# Patient Record
Sex: FEMALE | Race: WHITE | Employment: FULL TIME | URBAN - METROPOLITAN AREA
[De-identification: names, ages, dates, MRNs, and addresses within clinical notes are randomized per-mention and may not be internally consistent; named-entity substitution may affect disease eponyms.]

---

## 2017-06-29 ENCOUNTER — ANESTHESIA EVENT (OUTPATIENT)
Dept: SURGERY | Age: 47
End: 2017-06-29
Payer: COMMERCIAL

## 2017-06-29 RX ORDER — MIDAZOLAM HYDROCHLORIDE 1 MG/ML
2 INJECTION, SOLUTION INTRAMUSCULAR; INTRAVENOUS ONCE
Status: CANCELLED | OUTPATIENT
Start: 2017-06-29 | End: 2017-06-29

## 2017-06-29 RX ORDER — OXYCODONE HYDROCHLORIDE 5 MG/1
5 TABLET ORAL
Status: CANCELLED | OUTPATIENT
Start: 2017-06-29

## 2017-06-29 RX ORDER — HYDROMORPHONE HYDROCHLORIDE 2 MG/ML
0.5 INJECTION, SOLUTION INTRAMUSCULAR; INTRAVENOUS; SUBCUTANEOUS
Status: CANCELLED | OUTPATIENT
Start: 2017-06-29

## 2017-06-29 RX ORDER — FENTANYL CITRATE 50 UG/ML
100 INJECTION, SOLUTION INTRAMUSCULAR; INTRAVENOUS ONCE
Status: CANCELLED | OUTPATIENT
Start: 2017-06-29 | End: 2017-06-29

## 2017-06-29 RX ORDER — SODIUM CHLORIDE, SODIUM LACTATE, POTASSIUM CHLORIDE, CALCIUM CHLORIDE 600; 310; 30; 20 MG/100ML; MG/100ML; MG/100ML; MG/100ML
100 INJECTION, SOLUTION INTRAVENOUS CONTINUOUS
Status: CANCELLED | OUTPATIENT
Start: 2017-06-29

## 2017-06-30 ENCOUNTER — HOSPITAL ENCOUNTER (OUTPATIENT)
Age: 47
Setting detail: OUTPATIENT SURGERY
Discharge: HOME | End: 2017-06-30
Attending: ORTHOPAEDIC SURGERY | Admitting: ORTHOPAEDIC SURGERY
Payer: COMMERCIAL

## 2017-06-30 ENCOUNTER — ANESTHESIA (OUTPATIENT)
Dept: SURGERY | Age: 47
End: 2017-06-30
Payer: COMMERCIAL

## 2017-06-30 VITALS
HEART RATE: 85 BPM | TEMPERATURE: 97.1 F | DIASTOLIC BLOOD PRESSURE: 71 MMHG | RESPIRATION RATE: 16 BRPM | SYSTOLIC BLOOD PRESSURE: 144 MMHG | OXYGEN SATURATION: 93 % | BODY MASS INDEX: 32.1 KG/M2 | WEIGHT: 167.13 LBS

## 2017-06-30 PROCEDURE — 76210000063 HC OR PH I REC FIRST 0.5 HR: Performed by: ORTHOPAEDIC SURGERY

## 2017-06-30 PROCEDURE — 76010000154 HC OR TIME FIRST 0.5 HR: Performed by: ORTHOPAEDIC SURGERY

## 2017-06-30 PROCEDURE — 74011250636 HC RX REV CODE- 250/636

## 2017-06-30 PROCEDURE — 74011250636 HC RX REV CODE- 250/636: Performed by: ORTHOPAEDIC SURGERY

## 2017-06-30 PROCEDURE — 77030012712 HC WND ARTHRO ABLT S&N -E: Performed by: ORTHOPAEDIC SURGERY

## 2017-06-30 PROCEDURE — 77030018836 HC SOL IRR NACL ICUM -A: Performed by: ORTHOPAEDIC SURGERY

## 2017-06-30 PROCEDURE — 74011250636 HC RX REV CODE- 250/636: Performed by: ANESTHESIOLOGY

## 2017-06-30 PROCEDURE — 74011000250 HC RX REV CODE- 250

## 2017-06-30 PROCEDURE — 77030006668 HC BLD SHV MENSCS STRY -B: Performed by: ORTHOPAEDIC SURGERY

## 2017-06-30 PROCEDURE — 76060000032 HC ANESTHESIA 0.5 TO 1 HR: Performed by: ORTHOPAEDIC SURGERY

## 2017-06-30 PROCEDURE — 76210000020 HC REC RM PH II FIRST 0.5 HR: Performed by: ORTHOPAEDIC SURGERY

## 2017-06-30 PROCEDURE — 77030020143 HC AIRWY LARYN INTUB CGAS -A: Performed by: ANESTHESIOLOGY

## 2017-06-30 PROCEDURE — 77030000032 HC CUF TRNQT ZIMM -B: Performed by: ORTHOPAEDIC SURGERY

## 2017-06-30 RX ORDER — DIPHENHYDRAMINE HCL 25 MG
25 TABLET ORAL
COMMUNITY
End: 2017-10-24

## 2017-06-30 RX ORDER — LIDOCAINE HYDROCHLORIDE 10 MG/ML
0.1 INJECTION INFILTRATION; PERINEURAL AS NEEDED
Status: DISCONTINUED | OUTPATIENT
Start: 2017-06-30 | End: 2017-06-30 | Stop reason: HOSPADM

## 2017-06-30 RX ORDER — PROPOFOL 10 MG/ML
INJECTION, EMULSION INTRAVENOUS AS NEEDED
Status: DISCONTINUED | OUTPATIENT
Start: 2017-06-30 | End: 2017-06-30 | Stop reason: HOSPADM

## 2017-06-30 RX ORDER — FENTANYL CITRATE 50 UG/ML
INJECTION, SOLUTION INTRAMUSCULAR; INTRAVENOUS AS NEEDED
Status: DISCONTINUED | OUTPATIENT
Start: 2017-06-30 | End: 2017-06-30 | Stop reason: HOSPADM

## 2017-06-30 RX ORDER — ONDANSETRON 2 MG/ML
INJECTION INTRAMUSCULAR; INTRAVENOUS AS NEEDED
Status: DISCONTINUED | OUTPATIENT
Start: 2017-06-30 | End: 2017-06-30 | Stop reason: HOSPADM

## 2017-06-30 RX ORDER — SODIUM CHLORIDE, SODIUM LACTATE, POTASSIUM CHLORIDE, CALCIUM CHLORIDE 600; 310; 30; 20 MG/100ML; MG/100ML; MG/100ML; MG/100ML
100 INJECTION, SOLUTION INTRAVENOUS CONTINUOUS
Status: DISCONTINUED | OUTPATIENT
Start: 2017-06-30 | End: 2017-06-30 | Stop reason: HOSPADM

## 2017-06-30 RX ORDER — LORAZEPAM 1 MG/1
1 TABLET ORAL AS NEEDED
COMMUNITY
End: 2017-10-24

## 2017-06-30 RX ORDER — MIDAZOLAM HYDROCHLORIDE 1 MG/ML
2 INJECTION, SOLUTION INTRAMUSCULAR; INTRAVENOUS
Status: COMPLETED | OUTPATIENT
Start: 2017-06-30 | End: 2017-06-30

## 2017-06-30 RX ORDER — ROPIVACAINE HYDROCHLORIDE 5 MG/ML
INJECTION, SOLUTION EPIDURAL; INFILTRATION; PERINEURAL AS NEEDED
Status: DISCONTINUED | OUTPATIENT
Start: 2017-06-30 | End: 2017-06-30 | Stop reason: HOSPADM

## 2017-06-30 RX ORDER — DEXAMETHASONE SODIUM PHOSPHATE 4 MG/ML
INJECTION, SOLUTION INTRA-ARTICULAR; INTRALESIONAL; INTRAMUSCULAR; INTRAVENOUS; SOFT TISSUE AS NEEDED
Status: DISCONTINUED | OUTPATIENT
Start: 2017-06-30 | End: 2017-06-30 | Stop reason: HOSPADM

## 2017-06-30 RX ORDER — LIDOCAINE HYDROCHLORIDE 20 MG/ML
INJECTION, SOLUTION EPIDURAL; INFILTRATION; INTRACAUDAL; PERINEURAL AS NEEDED
Status: DISCONTINUED | OUTPATIENT
Start: 2017-06-30 | End: 2017-06-30 | Stop reason: HOSPADM

## 2017-06-30 RX ORDER — CEFAZOLIN SODIUM IN 0.9 % NACL 2 G/50 ML
2 INTRAVENOUS SOLUTION, PIGGYBACK (ML) INTRAVENOUS ONCE
Status: COMPLETED | OUTPATIENT
Start: 2017-06-30 | End: 2017-06-30

## 2017-06-30 RX ADMIN — FENTANYL CITRATE 50 MCG: 50 INJECTION, SOLUTION INTRAMUSCULAR; INTRAVENOUS at 08:47

## 2017-06-30 RX ADMIN — CEFAZOLIN 2 G: 1 INJECTION, POWDER, FOR SOLUTION INTRAMUSCULAR; INTRAVENOUS; PARENTERAL at 08:33

## 2017-06-30 RX ADMIN — CEFAZOLIN 2 G: 1 INJECTION, POWDER, FOR SOLUTION INTRAMUSCULAR; INTRAVENOUS; PARENTERAL at 08:46

## 2017-06-30 RX ADMIN — SODIUM CHLORIDE, SODIUM LACTATE, POTASSIUM CHLORIDE, AND CALCIUM CHLORIDE 100 ML/HR: 600; 310; 30; 20 INJECTION, SOLUTION INTRAVENOUS at 07:30

## 2017-06-30 RX ADMIN — LIDOCAINE HYDROCHLORIDE 100 MG: 20 INJECTION, SOLUTION EPIDURAL; INFILTRATION; INTRACAUDAL; PERINEURAL at 08:39

## 2017-06-30 RX ADMIN — MIDAZOLAM HYDROCHLORIDE 2 MG: 1 INJECTION, SOLUTION INTRAMUSCULAR; INTRAVENOUS at 08:33

## 2017-06-30 RX ADMIN — ONDANSETRON 4 MG: 2 INJECTION INTRAMUSCULAR; INTRAVENOUS at 08:46

## 2017-06-30 RX ADMIN — PROPOFOL 200 MG: 10 INJECTION, EMULSION INTRAVENOUS at 08:39

## 2017-06-30 RX ADMIN — DEXAMETHASONE SODIUM PHOSPHATE 4 MG: 4 INJECTION, SOLUTION INTRA-ARTICULAR; INTRALESIONAL; INTRAMUSCULAR; INTRAVENOUS; SOFT TISSUE at 08:46

## 2017-06-30 NOTE — H&P
Outpatient Surgery History and Physical:  Lenny Cox was seen and examined. CHIEF COMPLAINT:   Right knee pain. PE:     Visit Vitals    /88 (BP 1 Location: Right arm, BP Patient Position: Sitting)    Pulse 100    Temp 98.3 °F (36.8 °C)    Resp 18    Wt 75.8 kg (167 lb 2 oz)    SpO2 96%    BMI 32.1 kg/m2       Heart:   Regular rhythm      Lungs:  Are clear      Past Medical History: There are no active problems to display for this patient. Surgical History:   Past Surgical History:   Procedure Laterality Date    ABDOMEN SURGERY PROC UNLISTED      enmanuel    HX GYN      C-sec X 1    HX GYN      JONE       Social History: Patient  reports that she has never smoked. She has never used smokeless tobacco. She reports that she does not drink alcohol. Family History:   Family History   Problem Relation Age of Onset    Hypertension Mother     Heart Disease Mother      hx MVR    Hypertension Father     Hypertension Sister        Allergies: Reviewed per EMR  Allergies   Allergen Reactions    Adhesive Tape-Silicones Rash    Morphine Other (comments)     Grandmother had brain stem stroke with this    Penicillins Rash       Medications:    No current facility-administered medications on file prior to encounter. No current outpatient prescriptions on file prior to encounter. The surgery is planned for the right knee. History and physical has been reviewed. The patient has been examined. There have been no significant clinical changes since the completion of the originally dated History and Physical.  Patient identified by surgeon; surgical site was confirmed by patient and surgeon. The patient is here today for outpatient surgery. I have examined the patient, no changes are noted in the patient's medical status. Necessity for the procedure/care is still present and the history and physical above is current.   See the office notes for the full long term history of the problem. Please see the recent office notes for the musculoskeletal examination.     Signed By: VIKAS Muniz     June 30, 2017 7:06 AM

## 2017-06-30 NOTE — DISCHARGE INSTRUCTIONS
Post-Operative Instructions   For  Knee Arthroscopy  Phone:  [phone]    1. Unless otherwise instructed, you may place as much weight on your leg as you wish. 2. If you do not have an \"Iceman\" type cooling unit, for the first 48-72 hours following surgery, use ice on the knee every two hours (while awake) for 20-30 minutes at a time to help prevent swelling and lessen pain. If you have a cooling unit, follow the instructions given to you- continually as much as possible the first 48-72 hours, then 3-4 times a day for 4 weeks. Elevate leg. 3. Perform at least 30 to 50 leg-raising exercises twice a day. Begin exercise as soon as pain allows. Also perform gentle active motion of the knee as soon as pain allows. 4. On the third day after surgery, remove the dressing. Leave steri-strips on, they eventually will peel off.      5. Use any pain medication as instructed. You should take your pain medication as soon as you feel the anesthetic wearing off. Do not wait until you are in severe pain to begin taking your pain medication. 6. You may have some side effects from your pain medication. If you have nausea, try taking your medication with food. For itching, you may take over the counter Benadryl. 7. You may shower as soon as desired. The first three days after surgery, wrap the dressings in saran wrap to keep them dry when showering. 8. You may have been given a prescription for Zofran or Phenergan. This medication is used for nausea and vomiting. You do not need to get this prescription filled unless you have a problem. 9. If you have a problem, please call [address] [place] [address] at [phone]    [employer] ACTIVITY  · As tolerated and as directed by your doctor. · Bathe or shower as directed by your doctor. DIET  · Clear liquids until no nausea or vomiting; then light diet for the first day.   · Advance to regular diet on second day, unless your doctor orders otherwise. · If nausea and vomiting continues, call your doctor. PAIN  · Take pain medication as directed by your doctor. · Call your doctor if pain is NOT relieved by medication. · DO NOT take aspirin of blood thinners unless directed by your doctor. CALL YOUR DOCTOR IF   · Excessive bleeding that does not stop after holding pressure over the area  · Temperature of 101 degrees F or above  · Excessive redness, swelling or bruising, and/ or green or yellow, smelly discharge from incision    AFTER ANESTHESIA   · For the first 24 hours: DO NOT Drive, Drink alcoholic beverages, or Make important decisions. · Be aware of dizziness following anesthesia and while taking pain medication. APPOINTMENT DATE/ TIME: Dr. Rosita Fields office will call you with follow-up appointment date and time. YOUR DOCTOR'S PHONE NUMBER: [phone]       DISCHARGE SUMMARY from Nurse    PATIENT INSTRUCTIONS:    After general anesthesia or intravenous sedation, for 24 hours or while taking prescription Narcotics:  · Limit your activities  · Do not drive and operate hazardous machinery  · Do not make important personal or business decisions  · Do  not drink alcoholic beverages  · If you have not urinated within 8 hours after discharge, please contact your surgeon on call. *  Please give a list of your current medications to your Primary Care Provider. *  Please update this list whenever your medications are discontinued, doses are      changed, or new medications (including over-the-counter products) are added. *  Please carry medication information at all times in case of emergency situations. These are general instructions for a healthy lifestyle:    No smoking/ No tobacco products/ Avoid exposure to second hand smoke    Surgeon General's Warning:  Quitting smoking now greatly reduces serious risk to your health.     Obesity, smoking, and sedentary lifestyle greatly increases your risk for illness    A healthy diet, regular physical exercise & weight monitoring are important for maintaining a healthy lifestyle    You may be retaining fluid if you have a history of heart failure or if you experience any of the following symptoms:  Weight gain of 3 pounds or more overnight or 5 pounds in a week, increased swelling in our hands or feet or shortness of breath while lying flat in bed. Please call your doctor as soon as you notice any of these symptoms; do not wait until your next office visit. Recognize signs and symptoms of STROKE:    F-face looks uneven    A-arms unable to move or move unevenly    S-speech slurred or non-existent    T-time-call 911 as soon as signs and symptoms begin-DO NOT go       Back to bed or wait to see if you get better-TIME IS BRAIN.

## 2017-06-30 NOTE — BRIEF OP NOTE
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]  Other tear of medial meniscus, current injury, right knee, initial encounter [S84.342P]  Postoperative Diagnosis: RIGHT KNEE - CHONDROMALACIA OF PATELLA / MEDIAL MENISCAL TEAR    Procedure(s):   RIGHT KNEE ARTHROSCOPY/ CHONDROPLASTY OF PATELLA / PARTIAL MEDIAL MENISECTOMY / ABRASION ARTHROPLASTY  Surgeon(s) and Role:      Mark Day MD - Primary         Assistant Staff:  Physician Assistant: VIKAS Mcelroy    Surgical Staff:  Circ-1: Leatrice Curling, RN  Physician Assistant: VIKAS Mcelroy  Scrub Tech-1: Maki Leija  Event Time In   Incision Start 4256   Incision Close 5764     Anesthesia: General   Estimated Blood Loss: min  Specimens: * No specimens in log *   Findings: cmp/mm   Complications: none  Implants: * No implants in log *

## 2017-06-30 NOTE — IP AVS SNAPSHOT
Summary of Care Report The Summary of Care report has been created to help improve care coordination. Users with access to [employer] or [address] (Web-based application) may access additional patient information including the Discharge Summary. If you are not currently a [address] user and need more information, please call the number listed below in the [place] [address] section and ask to be connected with Medical Records. Facility Information Name Address Phone [address] [address] [address] [phone] Patient Information Patient Name Sex  Anjelica Sahu ([id number]) Female 1970 Discharge Information Admitting Provider Service Area Unit Uma Velazquez MD / [address] [place] [address] / [phone] Discharge Provider Discharge Date/Time Discharge Disposition Destination (none) 2017 (Pending) A[facility] (none) Patient Language Language ENGLISH [13] You are allergic to the following Allergen Reactions Adhesive Tape-Silicones Rash Morphine Other (comments) Grandmother had brain stem stroke with this Penicillins Rash Current Discharge Medication List  
  
CONTINUE these medications which have NOT CHANGED Dose & Instructions Dispensing Information Comments AZO BLADDER CONTROL 300 mg Cap Generic drug:  pumpkin seed extract-soy germ Take  by mouth two (2) times a day. Refills:  0  
   
 COZAAR 50 mg tablet Generic drug:  losartan Dose:  50 mg Take 50 mg by mouth nightly. Refills:  0  
   
 traMADol 50 mg tablet Commonly known as:  ULTRAM  
 Dose:  50 mg Take 50 mg by mouth every six (6) hours as needed for Pain. Indications: Pain Refills:  0 ASK your doctor about these medications Dose & Instructions Dispensing Information Comments ATIVAN 1 mg tablet Generic drug:  LORazepam  
 Dose:  1 mg Take 1 mg by mouth as needed for Anxiety. Refills:  0  
   
 BENADRYL ALLERGY 25 mg tablet Generic drug:  diphenhydrAMINE Dose:  25 mg Take 25 mg by mouth every six (6) hours as needed for Sleep. Refills:  0 Surgery Information ID Date/Time Status Primary Surgeon All Procedures Location [id number] 6/30/2017 0900 Unposted Dylan Harkins MD  RIGHT KNEE ARTHROSCOPY/ CHONDROPLASTY OF PATELLA / PARTIAL MEDIAL MENISECTOMY / ABRASION ARTHROPLASTY [facility] Follow-up Information Follow up With Details Comments Contact Info None   None (395) Patient stated that they have no PCP Discharge Instructions Post-Operative Instructions For 
Knee Arthroscopy Phone:  [phone] 1. Unless otherwise instructed, you may place as much weight on your leg as you wish. 2. If you do not have an \"Iceman\" type cooling unit, for the first 48-72 hours following surgery, use ice on the knee every two hours (while awake) for 20-30 minutes at a time to help prevent swelling and lessen pain. If you have a cooling unit, follow the instructions given to you- continually as much as possible the first 48-72 hours, then 3-4 times a day for 4 weeks. Elevate leg. 3. Perform at least 30 to 50 leg-raising exercises twice a day. Begin exercise as soon as pain allows. Also perform gentle active motion of the knee as soon as pain allows. 4. On the third day after surgery, remove the dressing. Leave steri-strips on, they eventually will peel off.   
 
5. Use any pain medication as instructed. You should take your pain medication as soon as you feel the anesthetic wearing off. Do not wait until you are in severe pain to begin taking your pain medication. 6. You may have some side effects from your pain medication.   If you have nausea, try taking your medication with food. For itching, you may take over the counter Benadryl. 7. You may shower as soon as desired. The first three days after surgery, wrap the dressings in saran wrap to keep them dry when showering. 8. You may have been given a prescription for Zofran or Phenergan. This medication is used for nausea and vomiting. You do not need to get this prescription filled unless you have a problem. 9. If you have a problem, please call [address] [place] [address] at [phone] [facility] [employer] [facility] ACTIVITY · As tolerated and as directed by your doctor. · Bathe or shower as directed by your doctor. DIET · Clear liquids until no nausea or vomiting; then light diet for the first day. · Advance to regular diet on second day, unless your doctor orders otherwise. · If nausea and vomiting continues, call your doctor. PAIN 
· Take pain medication as directed by your doctor. · Call your doctor if pain is NOT relieved by medication. · DO NOT take aspirin of blood thinners unless directed by your doctor. CALL YOUR DOCTOR IF  
· Excessive bleeding that does not stop after holding pressure over the area · Temperature of 101 degrees F or above · Excessive redness, swelling or bruising, and/ or green or yellow, smelly discharge from incision AFTER ANESTHESIA · For the first 24 hours: DO NOT Drive, Drink alcoholic beverages, or Make important decisions. · Be aware of dizziness following anesthesia and while taking pain medication. APPOINTMENT DATE/ TIME: Dr. Bernabe Ugalde office will call you with follow-up appointment date and time. YOUR DOCTOR'S PHONE NUMBER: [phone] DISCHARGE SUMMARY from Nurse PATIENT INSTRUCTIONS: 
 
After general anesthesia or intravenous sedation, for 24 hours or while taking prescription Narcotics: · Limit your activities · Do not drive and operate hazardous machinery · Do not make important personal or business decisions · Do  not drink alcoholic beverages · If you have not urinated within 8 hours after discharge, please contact your surgeon on call. *  Please give a list of your current medications to your Primary Care Provider. *  Please update this list whenever your medications are discontinued, doses are 
    changed, or new medications (including over-the-counter products) are added. *  Please carry medication information at all times in case of emergency situations. These are general instructions for a healthy lifestyle: No smoking/ No tobacco products/ Avoid exposure to second hand smoke Surgeon General's Warning:  Quitting smoking now greatly reduces serious risk to your health. Obesity, smoking, and sedentary lifestyle greatly increases your risk for illness A healthy diet, regular physical exercise & weight monitoring are important for maintaining a healthy lifestyle You may be retaining fluid if you have a history of heart failure or if you experience any of the following symptoms:  Weight gain of 3 pounds or more overnight or 5 pounds in a week, increased swelling in our hands or feet or shortness of breath while lying flat in bed. Please call your doctor as soon as you notice any of these symptoms; do not wait until your next office visit. Recognize signs and symptoms of STROKE: 
 
F-face looks uneven A-arms unable to move or move unevenly S-speech slurred or non-existent T-time-call 911 as soon as signs and symptoms begin-DO NOT go Back to bed or wait to see if you get better-TIME IS BRAIN. Chart Review Routing History No Routing History on File

## 2017-06-30 NOTE — ANESTHESIA POSTPROCEDURE EVALUATION
Post-Anesthesia Evaluation and Assessment    Patient: Porsche Hernandez MRN: [id number]  SSN: [id number]    YOB: 1970  Age: 40 y.o. Sex: female       Cardiovascular Function/Vital Signs  Visit Vitals    /66    Pulse 78    Temp 36.2 °C (97.1 °F)    Resp 13    Wt 75.8 kg (167 lb 2 oz)    SpO2 98%    BMI 32.1 kg/m2       Patient is status post general anesthesia for Procedure(s):   RIGHT KNEE ARTHROSCOPY/ CHONDROPLASTY OF PATELLA / PARTIAL MEDIAL MENISECTOMY / ABRASION ARTHROPLASTY. Nausea/Vomiting: None    Postoperative hydration reviewed and adequate. Pain:  Pain Scale 1: Visual (06/30/17 0917)  Pain Intensity 1: 0 (06/30/17 0917)   Managed    Neurological Status:   Neuro (WDL): Exceptions to WDL (06/30/17 0917)  Neuro  LUE Motor Response: Purposeful (06/30/17 0917)  LLE Motor Response: Purposeful (06/30/17 0917)  RUE Motor Response: Purposeful (06/30/17 0917)  RLE Motor Response: Purposeful (06/30/17 0917)   At baseline    Mental Status and Level of Consciousness: Awake. Pulmonary Status:   O2 Device: Nasal cannula (06/30/17 0917)   Adequate oxygenation and airway patent    Complications related to anesthesia: None    Post-anesthesia assessment completed.  No concerns    Signed By: Mynor De Leon MD     June 30, 2017

## 2017-06-30 NOTE — ANESTHESIA PREPROCEDURE EVALUATION
Anesthetic History   No history of anesthetic complications            Review of Systems / Medical History  Pertinent labs reviewed    Pulmonary  Within defined limits                 Neuro/Psych   Within defined limits           Cardiovascular  Within defined limits                Exercise tolerance: >4 METS     GI/Hepatic/Renal  Within defined limits              Endo/Other        Obesity     Other Findings            Physical Exam    Airway  Mallampati: III  TM Distance: 4 - 6 cm  Neck ROM: normal range of motion   Mouth opening: Normal     Cardiovascular  Regular rate and rhythm,  S1 and S2 normal,  no murmur, click, rub, or gallop             Dental  No notable dental hx       Pulmonary  Breath sounds clear to auscultation               Abdominal  GI exam deferred       Other Findings            Anesthetic Plan    ASA: 2  Anesthesia type: general          Induction: Intravenous  Anesthetic plan and risks discussed with: Patient and Mother

## 2017-06-30 NOTE — IP AVS SNAPSHOT
[address] 
[phone] Patient: Valentine Richter MRN: [id number] GAR:[birth year] You are allergic to the following Allergen Reactions Adhesive Tape-Silicones Rash Morphine Other (comments) Grandmother had brain stem stroke with this Penicillins Rash Recent Documentation Weight BMI OB Status Smoking Status 75.8 kg 32.1 kg/m2 Hysterectomy Never Smoker Emergency Contacts Name Discharge Info Relation Home Work [employer] [address] C[employer] [3] Mother [14] [phone] About your hospitalization You were admitted on:  June 30, 2017 [facility] You were discharged on:  June 30, 2017 Unit phone number:  [phone] Why you were hospitalized Your primary diagnosis was:  Not on File Providers Seen During Your Hospitalizations Provider Role Specialty Primary office phone Nata Garcia MD Attending Provider Orthopedic Surgery [phone] Your Primary Care Physician (PCP) Primary Care Physician Office Phone Office Fax NONE ** None ** ** None ** Follow-up Information Follow up With Details Comments Contact Info None   None ([phone]) Patient stated that they have no PCP Current Discharge Medication List  
  
CONTINUE these medications which have NOT CHANGED Dose & Instructions Dispensing Information Comments Morning Noon Evening Bedtime AZO BLADDER CONTROL 300 mg Cap Generic drug:  pumpkin seed extract-soy germ Your last dose was: Your next dose is: Take  by mouth two (2) times a day. Refills:  0  
     
   
   
   
  
 COZAAR 50 mg tablet Generic drug:  losartan Your last dose was: Your next dose is:    
   
   
 Dose:  50 mg Take 50 mg by mouth nightly. Refills:  0 traMADol 50 mg tablet Commonly known as:  ULTRAM  
   
Your last dose was: Your next dose is:    
   
   
 Dose:  50 mg Take 50 mg by mouth every six (6) hours as needed for Pain. Indications: Pain Refills:  0 ASK your doctor about these medications Dose & Instructions Dispensing Information Comments Morning Noon Evening Bedtime ATIVAN 1 mg tablet Generic drug:  LORazepam  
   
Your last dose was: Your next dose is:    
   
   
 Dose:  1 mg Take 1 mg by mouth as needed for Anxiety. Refills:  0  
     
   
   
   
  
 BENADRYL ALLERGY 25 mg tablet Generic drug:  diphenhydrAMINE Your last dose was: Your next dose is:    
   
   
 Dose:  25 mg Take 25 mg by mouth every six (6) hours as needed for Sleep. Refills:  0 Discharge Instructions Post-Operative Instructions For 
Knee Arthroscopy Phone:  [phone] 1. Unless otherwise instructed, you may place as much weight on your leg as you wish. 2. If you do not have an \"Iceman\" type cooling unit, for the first 48-72 hours following surgery, use ice on the knee every two hours (while awake) for 20-30 minutes at a time to help prevent swelling and lessen pain. If you have a cooling unit, follow the instructions given to you- continually as much as possible the first 48-72 hours, then 3-4 times a day for 4 weeks. Elevate leg. 3. Perform at least 30 to 50 leg-raising exercises twice a day. Begin exercise as soon as pain allows. Also perform gentle active motion of the knee as soon as pain allows. 4. On the third day after surgery, remove the dressing. Leave steri-strips on, they eventually will peel off.   
 
5. Use any pain medication as instructed. You should take your pain medication as soon as you feel the anesthetic wearing off.   Do not wait until you are in severe pain to begin taking your pain medication. 6. You may have some side effects from your pain medication. If you have nausea, try taking your medication with food. For itching, you may take over the counter Benadryl. 7. You may shower as soon as desired. The first three days after surgery, wrap the dressings in saran wrap to keep them dry when showering. 8. You may have been given a prescription for Zofran or Phenergan. This medication is used for nausea and vomiting. You do not need to get this prescription filled unless you have a problem. 9. If you have a problem, please call [address] [place] [address] at [phone] [employer] ACTIVITY · As tolerated and as directed by your doctor. · Bathe or shower as directed by your doctor. DIET · Clear liquids until no nausea or vomiting; then light diet for the first day. · Advance to regular diet on second day, unless your doctor orders otherwise. · If nausea and vomiting continues, call your doctor. PAIN 
· Take pain medication as directed by your doctor. · Call your doctor if pain is NOT relieved by medication. · DO NOT take aspirin of blood thinners unless directed by your doctor. CALL YOUR DOCTOR IF  
· Excessive bleeding that does not stop after holding pressure over the area · Temperature of 101 degrees F or above · Excessive redness, swelling or bruising, and/ or green or yellow, smelly discharge from incision AFTER ANESTHESIA · For the first 24 hours: DO NOT Drive, Drink alcoholic beverages, or Make important decisions. · Be aware of dizziness following anesthesia and while taking pain medication. APPOINTMENT DATE/ TIME: Dr. Richard Lee office will call you with follow-up appointment date and time. YOUR DOCTOR'S PHONE NUMBER: [phone] DISCHARGE SUMMARY from Nurse PATIENT INSTRUCTIONS: 
 
 After general anesthesia or intravenous sedation, for 24 hours or while taking prescription Narcotics: · Limit your activities · Do not drive and operate hazardous machinery · Do not make important personal or business decisions · Do  not drink alcoholic beverages · If you have not urinated within 8 hours after discharge, please contact your surgeon on call. *  Please give a list of your current medications to your Primary Care Provider. *  Please update this list whenever your medications are discontinued, doses are 
    changed, or new medications (including over-the-counter products) are added. *  Please carry medication information at all times in case of emergency situations. These are general instructions for a healthy lifestyle: No smoking/ No tobacco products/ Avoid exposure to second hand smoke Surgeon General's Warning:  Quitting smoking now greatly reduces serious risk to your health. Obesity, smoking, and sedentary lifestyle greatly increases your risk for illness A healthy diet, regular physical exercise & weight monitoring are important for maintaining a healthy lifestyle You may be retaining fluid if you have a history of heart failure or if you experience any of the following symptoms:  Weight gain of 3 pounds or more overnight or 5 pounds in a week, increased swelling in our hands or feet or shortness of breath while lying flat in bed. Please call your doctor as soon as you notice any of these symptoms; do not wait until your next office visit. Recognize signs and symptoms of STROKE: 
 
F-face looks uneven A-arms unable to move or move unevenly S-speech slurred or non-existent T-time-call 911 as soon as signs and symptoms begin-DO NOT go Back to bed or wait to see if you get better-TIME IS BRAIN. Discharge Orders None Introducing [facility]!    
 [facility] introduces [employer] patient portal. Now you can access parts of your medical record, email your doctor's office, and request medication refills online. 1. In your internet browser, go to [email or web address] 2. Click on the First Time User? Click Here link in the Sign In box. You will see the New Member Sign Up page. 3. Enter your [employer] Access Code exactly as it appears below. You will not need to use this code after youve completed the sign-up process. If you do not sign up before the expiration date, you must request a new code. · [employer] Access Code: [email or web address] Expires: 9/26/2017  9:08 AM 
 
4. Enter the last four digits of your Social Security Number (xxxx) and Date of Birth (mm/dd/yyyy) as indicated and click Submit. You will be taken to the next sign-up page. 5. Create a [employer] ID. This will be your [employer] login ID and cannot be changed, so think of one that is secure and easy to remember. 6. Create a [employer] password. You can change your password at any time. 7. Enter your Password Reset Question and Answer. This can be used at a later time if you forget your password. 8. Enter your e-mail address. You will receive e-mail notification when new information is available in [address] 9. Click Sign Up. You can now view and download portions of your medical record. 10. Click the Download Summary menu link to download a portable copy of your medical information. If you have questions, please visit the Frequently Asked Questions section of the [employer] website. Remember, [employer] is NOT to be used for urgent needs. For medical emergencies, dial 911. Now available from your iPhone and Android! General Information Please provide this summary of care documentation to your next provider. Patient Signature:  ____________________________________________________________ Date:  ____________________________________________________________  
  
Fayrene Retort  Provider Signature: ____________________________________________________________ Date:  ____________________________________________________________

## 2017-06-30 NOTE — IP AVS SNAPSHOT
Current Discharge Medication List  
  
CONTINUE these medications which have NOT CHANGED Dose & Instructions Dispensing Information Comments Morning Noon Evening Bedtime AZO BLADDER CONTROL 300 mg Cap Generic drug:  pumpkin seed extract-soy germ Your last dose was: Your next dose is: Take  by mouth two (2) times a day. Refills:  0  
     
   
   
   
  
 COZAAR 50 mg tablet Generic drug:  losartan Your last dose was: Your next dose is:    
   
   
 Dose:  50 mg Take 50 mg by mouth nightly. Refills:  0  
     
   
   
   
  
 traMADol 50 mg tablet Commonly known as:  ULTRAM  
   
Your last dose was: Your next dose is:    
   
   
 Dose:  50 mg Take 50 mg by mouth every six (6) hours as needed for Pain. Indications: Pain Refills:  0 ASK your doctor about these medications Dose & Instructions Dispensing Information Comments Morning Noon Evening Bedtime ATIVAN 1 mg tablet Generic drug:  LORazepam  
   
Your last dose was: Your next dose is:    
   
   
 Dose:  1 mg Take 1 mg by mouth as needed for Anxiety. Refills:  0  
     
   
   
   
  
 BENADRYL ALLERGY 25 mg tablet Generic drug:  diphenhydrAMINE Your last dose was: Your next dose is:    
   
   
 Dose:  25 mg Take 25 mg by mouth every six (6) hours as needed for Sleep. Refills:  0

## 2017-06-30 NOTE — OP NOTES
[address] 66   OPERATIVE REPORT       Name:  Abdi Salmeron   MR#:  [id number]   :  1970   Account #:  [de-identified]   Date of Adm:  2017       DATE OF SURGERY: 2017     PREOPERATIVE DIAGNOSES   1. Chondromalacia of patella--patellofemoral compartment. 2. Medial meniscal tear and chondromalacia of medial femoral   condyle--medial compartment, right knee. POSTOPERATIVE DIAGNOSES   1. Chondromalacia of patella--patellofemoral compartment. 2. Medial meniscal tear and chondromalacia of medial femoral   condyle--medial compartment, right knee. OPERATION PERFORMED   1. Abrasion arthroplasty--patellofemoral compartment. 2. Partial medial meniscectomy, chondroplasty of medial femoral   condyle--medial compartment, right knee. SURGEON: Cam Duarte MD    ASSISTANT: VIKAS Hawkins    ANESTHESIA: General.     FLUIDS: Crystalloid. ESTIMATED BLOOD LOSS: Minimal.     FINDINGS: There was a grade 2, 3, 4 chondromalacia of the   patella, 2 x 2 cm. There was grade 2, 3 chondromalacia of the   medial femoral condyle, 1.5 x 1.5 cm, and a tear of the body of   the medial meniscus. DESCRIPTION OF PROCEDURE: After informed consent, the patient   was brought to the operating room and placed on the table in   supine position. General endotracheal anesthesia was   administered without difficulty. Tourniquet was applied to the   right upper thigh. Right knee and leg were prepped and draped in   a sterile fashion. Tourniquet was inflated. Standard   inferomedial and inferolateral portals were made for scope and   instruments. The knee was then arthroscoped in a sequential   manner. The aforementioned findings were noted. Attention was directed to the patellofemoral compartment. Using   the shaver and the Mini Vac system, a chondroplasty of the   patella was performed. All loose cartilage flaps were removed.    There were no sharp edges or unstable fragments after the chondroplasty. There was an area of exposed bone in a contained   defect. An abrasion arthroplasty was performed down to bleeding   subchondral bone without difficulty. Attention was directed to the medial compartment of the knee. Using hand instruments and shaver and Mini Vac, a partial medial   meniscectomy was performed. All the torn portion was removed. At   the termination of the partial medial meniscectomy, the   remaining meniscal rim had a smooth contour with no sharp edges   or unstable fragments. A chondroplasty of the medial femoral   condyle was performed back to a smooth, stable rim. The knee was   thoroughly irrigated. Portals were closed. Sterile dressings   were applied. The patient was extubated and taken to the   recovery room in stable condition. VIKAS Bundy, assisted during the procedure. He was necessary   for knee injection, leg positioning during the procedure, and   assistance with the major portions of the operation. His   presence decreased the operative time and potential complication   rate.         MD LUIS ANTONIO Nunn / JOHNNA   D:  06/30/2017   09:07   T:  06/30/2017   10:05   Job #:  [id number]

## 2017-12-27 PROBLEM — F51.04 PSYCHOPHYSIOLOGICAL INSOMNIA: Status: ACTIVE | Noted: 2017-12-27

## 2017-12-27 PROBLEM — F32.9 REACTIVE DEPRESSION: Status: ACTIVE | Noted: 2017-12-27

## 2018-07-16 ENCOUNTER — HOSPITAL ENCOUNTER (OUTPATIENT)
Dept: MAMMOGRAPHY | Age: 48
Discharge: HOME | End: 2018-07-16
Attending: FAMILY MEDICINE
Payer: COMMERCIAL

## 2018-07-16 VITALS
SYSTOLIC BLOOD PRESSURE: 160 MMHG | TEMPERATURE: 97.2 F | HEART RATE: 69 BPM | OXYGEN SATURATION: 99 % | DIASTOLIC BLOOD PRESSURE: 72 MMHG

## 2018-07-16 DIAGNOSIS — R92.8 ABNORMAL MAMMOGRAM OF RIGHT BREAST: ICD-10-CM

## 2018-07-16 PROCEDURE — 19081 BX BREAST 1ST LESION STRTCTC: CPT

## 2018-07-16 PROCEDURE — 74011250636 HC RX REV CODE- 250/636: Performed by: FAMILY MEDICINE

## 2018-07-16 PROCEDURE — 74011000250 HC RX REV CODE- 250: Performed by: FAMILY MEDICINE

## 2018-07-16 PROCEDURE — 88305 TISSUE EXAM BY PATHOLOGIST: CPT | Performed by: FAMILY MEDICINE

## 2018-07-16 PROCEDURE — 77065 DX MAMMO INCL CAD UNI: CPT

## 2018-07-16 RX ORDER — LIDOCAINE HYDROCHLORIDE AND EPINEPHRINE 10; 10 MG/ML; UG/ML
1.5 INJECTION, SOLUTION INFILTRATION; PERINEURAL
Status: COMPLETED | OUTPATIENT
Start: 2018-07-16 | End: 2018-07-16

## 2018-07-16 RX ORDER — LIDOCAINE HYDROCHLORIDE 20 MG/ML
5 INJECTION, SOLUTION INFILTRATION; PERINEURAL
Status: COMPLETED | OUTPATIENT
Start: 2018-07-16 | End: 2018-07-16

## 2018-07-16 RX ORDER — LIDOCAINE HYDROCHLORIDE AND EPINEPHRINE 10; 10 MG/ML; UG/ML
10 INJECTION, SOLUTION INFILTRATION; PERINEURAL
Status: COMPLETED | OUTPATIENT
Start: 2018-07-16 | End: 2018-07-16

## 2018-07-16 RX ADMIN — LIDOCAINE HYDROCHLORIDE,EPINEPHRINE BITARTRATE 1 ML: 10; .01 INJECTION, SOLUTION INFILTRATION; PERINEURAL at 12:00

## 2018-07-16 RX ADMIN — LIDOCAINE HYDROCHLORIDE 1.5 ML: 20 INJECTION, SOLUTION INFILTRATION; PERINEURAL at 11:57

## 2018-07-16 RX ADMIN — SODIUM CHLORIDE 250 ML: 900 INJECTION, SOLUTION INTRAVENOUS at 12:02

## 2018-07-16 RX ADMIN — LIDOCAINE HYDROCHLORIDE,EPINEPHRINE BITARTRATE 10 ML: 10; .01 INJECTION, SOLUTION INFILTRATION; PERINEURAL at 12:02

## 2018-07-24 PROBLEM — F32.A MILD DEPRESSION: Status: ACTIVE | Noted: 2018-07-24

## 2018-07-27 RX ORDER — CEFAZOLIN SODIUM 1 G/3ML
2-3 INJECTION, POWDER, FOR SOLUTION INTRAMUSCULAR; INTRAVENOUS
Status: CANCELLED | OUTPATIENT
Start: 2018-07-27 | End: 2018-07-27

## 2018-07-30 ENCOUNTER — HOSPITAL ENCOUNTER (OUTPATIENT)
Dept: SURGERY | Age: 48
Discharge: HOME | End: 2018-07-30

## 2018-07-30 VITALS — HEIGHT: 61 IN | BODY MASS INDEX: 23.98 KG/M2 | WEIGHT: 127 LBS

## 2018-07-30 NOTE — PERIOP NOTES
Patient verified name, , and surgery as listed in [facility]. Type 1B surgery, PAT phone assessment complete. Orders received. Labs per surgeon: none. Labs per anesthesia protocol: none. Patient answered medical/surgical history questions at their best of ability. All prior to admission medications documented in [facility]. Patient instructed to take the following medications the day of surgery according to anesthesia guidelines with a small sip of water: Zoloft. Hold all vitamins 7 days prior to surgery and NSAIDS 5 days prior to surgery. Medications to be held: all vitamins/supplements/herbals and Naproxen. Patient instructed on the following:  Arrive at [facility], time of arrival to be called the day before by 1700  NPO after midnight including gum, mints, and ice chips  Responsible adult must drive patient to the hospital, stay during surgery, and patient will  need supervision 24 hours after anesthesia  Use antibacterial soap in shower the night before surgery and on the morning of surgery  Leave all valuables (money and jewelry) at home but bring insurance card and ID on       DOS  Do not wear make-up, nail polish, lotions, cologne, perfumes, powders, or oil on skin. 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

## 2018-08-05 ENCOUNTER — ANESTHESIA EVENT (OUTPATIENT)
Dept: SURGERY | Age: 48
End: 2018-08-05
Payer: COMMERCIAL

## 2018-08-05 RX ORDER — FENTANYL CITRATE 50 UG/ML
100 INJECTION, SOLUTION INTRAMUSCULAR; INTRAVENOUS ONCE
Status: CANCELLED | OUTPATIENT
Start: 2018-08-05 | End: 2018-08-05

## 2018-08-05 RX ORDER — MIDAZOLAM HYDROCHLORIDE 1 MG/ML
2 INJECTION, SOLUTION INTRAMUSCULAR; INTRAVENOUS ONCE
Status: CANCELLED | OUTPATIENT
Start: 2018-08-05 | End: 2018-08-05

## 2018-08-06 ENCOUNTER — APPOINTMENT (OUTPATIENT)
Dept: MAMMOGRAPHY | Age: 48
End: 2018-08-06
Attending: SURGERY
Payer: COMMERCIAL

## 2018-08-06 ENCOUNTER — HOSPITAL ENCOUNTER (OUTPATIENT)
Age: 48
Setting detail: OUTPATIENT SURGERY
Discharge: HOME | End: 2018-08-06
Attending: SURGERY | Admitting: SURGERY
Payer: COMMERCIAL

## 2018-08-06 ENCOUNTER — ANESTHESIA (OUTPATIENT)
Dept: SURGERY | Age: 48
End: 2018-08-06
Payer: COMMERCIAL

## 2018-08-06 VITALS
OXYGEN SATURATION: 99 % | HEART RATE: 78 BPM | DIASTOLIC BLOOD PRESSURE: 67 MMHG | TEMPERATURE: 96.9 F | BODY MASS INDEX: 24.39 KG/M2 | HEIGHT: 61 IN | RESPIRATION RATE: 20 BRPM | WEIGHT: 129.2 LBS | SYSTOLIC BLOOD PRESSURE: 137 MMHG

## 2018-08-06 DIAGNOSIS — F32.9 REACTIVE DEPRESSION: Primary | ICD-10-CM

## 2018-08-06 DIAGNOSIS — C50.911 MALIGNANT NEOPLASM OF RIGHT FEMALE BREAST, UNSPECIFIED ESTROGEN RECEPTOR STATUS, UNSPECIFIED SITE OF BREAST (HCC): ICD-10-CM

## 2018-08-06 DIAGNOSIS — N60.91 ATYPICAL DUCTAL HYPERPLASIA OF RIGHT BREAST: ICD-10-CM

## 2018-08-06 PROCEDURE — 77030032490 HC SLV COMPR SCD KNE COVD -B: Performed by: SURGERY

## 2018-08-06 PROCEDURE — 88305 TISSUE EXAM BY PATHOLOGIST: CPT

## 2018-08-06 PROCEDURE — 74011250636 HC RX REV CODE- 250/636: Performed by: ANESTHESIOLOGY

## 2018-08-06 PROCEDURE — 76210000016 HC OR PH I REC 1 TO 1.5 HR: Performed by: SURGERY

## 2018-08-06 PROCEDURE — 74011250636 HC RX REV CODE- 250/636: Performed by: SURGERY

## 2018-08-06 PROCEDURE — 76060000033 HC ANESTHESIA 1 TO 1.5 HR: Performed by: SURGERY

## 2018-08-06 PROCEDURE — 74011000250 HC RX REV CODE- 250: Performed by: SURGERY

## 2018-08-06 PROCEDURE — 77030020782 HC GWN BAIR PAWS FLX 3M -B: Performed by: ANESTHESIOLOGY

## 2018-08-06 PROCEDURE — 76010000149 HC OR TIME 1 TO 1.5 HR: Performed by: SURGERY

## 2018-08-06 PROCEDURE — 74011250636 HC RX REV CODE- 250/636

## 2018-08-06 PROCEDURE — 77030031139 HC SUT VCRL2 J&J -A: Performed by: SURGERY

## 2018-08-06 PROCEDURE — 19281 PERQ DEVICE BREAST 1ST IMAG: CPT

## 2018-08-06 PROCEDURE — 74011000250 HC RX REV CODE- 250

## 2018-08-06 PROCEDURE — 77030018836 HC SOL IRR NACL ICUM -A: Performed by: SURGERY

## 2018-08-06 PROCEDURE — 77030020143 HC AIRWY LARYN INTUB CGAS -A: Performed by: ANESTHESIOLOGY

## 2018-08-06 RX ORDER — HYDROMORPHONE HYDROCHLORIDE 2 MG/ML
0.5 INJECTION, SOLUTION INTRAMUSCULAR; INTRAVENOUS; SUBCUTANEOUS
Status: DISCONTINUED | OUTPATIENT
Start: 2018-08-06 | End: 2018-08-06 | Stop reason: HOSPADM

## 2018-08-06 RX ORDER — ONDANSETRON 2 MG/ML
INJECTION INTRAMUSCULAR; INTRAVENOUS AS NEEDED
Status: DISCONTINUED | OUTPATIENT
Start: 2018-08-06 | End: 2018-08-06 | Stop reason: HOSPADM

## 2018-08-06 RX ORDER — MIDAZOLAM HYDROCHLORIDE 1 MG/ML
2 INJECTION, SOLUTION INTRAMUSCULAR; INTRAVENOUS
Status: COMPLETED | OUTPATIENT
Start: 2018-08-06 | End: 2018-08-06

## 2018-08-06 RX ORDER — BUPIVACAINE HYDROCHLORIDE 2.5 MG/ML
INJECTION, SOLUTION EPIDURAL; INFILTRATION; INTRACAUDAL AS NEEDED
Status: DISCONTINUED | OUTPATIENT
Start: 2018-08-06 | End: 2018-08-06 | Stop reason: HOSPADM

## 2018-08-06 RX ORDER — OXYCODONE HYDROCHLORIDE 5 MG/1
5 TABLET ORAL
Status: DISCONTINUED | OUTPATIENT
Start: 2018-08-06 | End: 2018-08-06 | Stop reason: HOSPADM

## 2018-08-06 RX ORDER — ONDANSETRON 2 MG/ML
4 INJECTION INTRAMUSCULAR; INTRAVENOUS ONCE
Status: COMPLETED | OUTPATIENT
Start: 2018-08-06 | End: 2018-08-06

## 2018-08-06 RX ORDER — LIDOCAINE HYDROCHLORIDE 10 MG/ML
5 INJECTION INFILTRATION; PERINEURAL
Status: COMPLETED | OUTPATIENT
Start: 2018-08-06 | End: 2018-08-06

## 2018-08-06 RX ORDER — CEFAZOLIN SODIUM/WATER 2 G/20 ML
2 SYRINGE (ML) INTRAVENOUS ONCE
Status: COMPLETED | OUTPATIENT
Start: 2018-08-06 | End: 2018-08-06

## 2018-08-06 RX ORDER — HYDROCODONE BITARTRATE AND ACETAMINOPHEN 5; 325 MG/1; MG/1
TABLET ORAL
Qty: 20 TAB | Refills: 0 | Status: SHIPPED | OUTPATIENT
Start: 2018-08-06 | End: 2018-08-22 | Stop reason: ALTCHOICE

## 2018-08-06 RX ORDER — DIPHENHYDRAMINE HYDROCHLORIDE 50 MG/ML
12.5 INJECTION, SOLUTION INTRAMUSCULAR; INTRAVENOUS
Status: DISCONTINUED | OUTPATIENT
Start: 2018-08-06 | End: 2018-08-06 | Stop reason: HOSPADM

## 2018-08-06 RX ORDER — DEXAMETHASONE SODIUM PHOSPHATE 4 MG/ML
INJECTION, SOLUTION INTRA-ARTICULAR; INTRALESIONAL; INTRAMUSCULAR; INTRAVENOUS; SOFT TISSUE AS NEEDED
Status: DISCONTINUED | OUTPATIENT
Start: 2018-08-06 | End: 2018-08-06 | Stop reason: HOSPADM

## 2018-08-06 RX ORDER — SODIUM CHLORIDE, SODIUM LACTATE, POTASSIUM CHLORIDE, CALCIUM CHLORIDE 600; 310; 30; 20 MG/100ML; MG/100ML; MG/100ML; MG/100ML
100 INJECTION, SOLUTION INTRAVENOUS CONTINUOUS
Status: DISCONTINUED | OUTPATIENT
Start: 2018-08-06 | End: 2018-08-06 | Stop reason: HOSPADM

## 2018-08-06 RX ORDER — NALOXONE HYDROCHLORIDE 0.4 MG/ML
0.1 INJECTION, SOLUTION INTRAMUSCULAR; INTRAVENOUS; SUBCUTANEOUS AS NEEDED
Status: DISCONTINUED | OUTPATIENT
Start: 2018-08-06 | End: 2018-08-06 | Stop reason: HOSPADM

## 2018-08-06 RX ORDER — LIDOCAINE HYDROCHLORIDE 10 MG/ML
0.1 INJECTION INFILTRATION; PERINEURAL AS NEEDED
Status: DISCONTINUED | OUTPATIENT
Start: 2018-08-06 | End: 2018-08-06 | Stop reason: HOSPADM

## 2018-08-06 RX ORDER — FENTANYL CITRATE 50 UG/ML
INJECTION, SOLUTION INTRAMUSCULAR; INTRAVENOUS AS NEEDED
Status: DISCONTINUED | OUTPATIENT
Start: 2018-08-06 | End: 2018-08-06 | Stop reason: HOSPADM

## 2018-08-06 RX ORDER — ALBUTEROL SULFATE 0.83 MG/ML
2.5 SOLUTION RESPIRATORY (INHALATION) AS NEEDED
Status: DISCONTINUED | OUTPATIENT
Start: 2018-08-06 | End: 2018-08-06 | Stop reason: HOSPADM

## 2018-08-06 RX ORDER — LIDOCAINE HYDROCHLORIDE 20 MG/ML
INJECTION, SOLUTION EPIDURAL; INFILTRATION; INTRACAUDAL; PERINEURAL AS NEEDED
Status: DISCONTINUED | OUTPATIENT
Start: 2018-08-06 | End: 2018-08-06 | Stop reason: HOSPADM

## 2018-08-06 RX ORDER — PROPOFOL 10 MG/ML
INJECTION, EMULSION INTRAVENOUS AS NEEDED
Status: DISCONTINUED | OUTPATIENT
Start: 2018-08-06 | End: 2018-08-06 | Stop reason: HOSPADM

## 2018-08-06 RX ORDER — EPHEDRINE SULFATE 50 MG/ML
INJECTION, SOLUTION INTRAVENOUS AS NEEDED
Status: DISCONTINUED | OUTPATIENT
Start: 2018-08-06 | End: 2018-08-06 | Stop reason: HOSPADM

## 2018-08-06 RX ORDER — OXYCODONE HYDROCHLORIDE 5 MG/1
10 TABLET ORAL
Status: DISCONTINUED | OUTPATIENT
Start: 2018-08-06 | End: 2018-08-06 | Stop reason: HOSPADM

## 2018-08-06 RX ADMIN — EPHEDRINE SULFATE 10 MG: 50 INJECTION, SOLUTION INTRAVENOUS at 11:29

## 2018-08-06 RX ADMIN — PROPOFOL 200 MG: 10 INJECTION, EMULSION INTRAVENOUS at 11:05

## 2018-08-06 RX ADMIN — LIDOCAINE HYDROCHLORIDE 100 MG: 20 INJECTION, SOLUTION EPIDURAL; INFILTRATION; INTRACAUDAL; PERINEURAL at 11:05

## 2018-08-06 RX ADMIN — MIDAZOLAM HYDROCHLORIDE 2 MG: 1 INJECTION, SOLUTION INTRAMUSCULAR; INTRAVENOUS at 10:10

## 2018-08-06 RX ADMIN — ONDANSETRON 4 MG: 2 INJECTION, SOLUTION INTRAMUSCULAR; INTRAVENOUS at 12:53

## 2018-08-06 RX ADMIN — FENTANYL CITRATE 100 MCG: 50 INJECTION, SOLUTION INTRAMUSCULAR; INTRAVENOUS at 11:13

## 2018-08-06 RX ADMIN — EPHEDRINE SULFATE 5 MG: 50 INJECTION, SOLUTION INTRAVENOUS at 11:40

## 2018-08-06 RX ADMIN — SODIUM CHLORIDE, SODIUM LACTATE, POTASSIUM CHLORIDE, AND CALCIUM CHLORIDE: 600; 310; 30; 20 INJECTION, SOLUTION INTRAVENOUS at 11:48

## 2018-08-06 RX ADMIN — DEXAMETHASONE SODIUM PHOSPHATE 10 MG: 4 INJECTION, SOLUTION INTRA-ARTICULAR; INTRALESIONAL; INTRAMUSCULAR; INTRAVENOUS; SOFT TISSUE at 11:19

## 2018-08-06 RX ADMIN — LIDOCAINE HYDROCHLORIDE 0.1 ML: 10 INJECTION, SOLUTION INFILTRATION; PERINEURAL at 08:57

## 2018-08-06 RX ADMIN — LIDOCAINE HYDROCHLORIDE 1.5 ML: 10 INJECTION, SOLUTION INFILTRATION; PERINEURAL at 10:19

## 2018-08-06 RX ADMIN — SODIUM CHLORIDE, SODIUM LACTATE, POTASSIUM CHLORIDE, AND CALCIUM CHLORIDE 100 ML/HR: 600; 310; 30; 20 INJECTION, SOLUTION INTRAVENOUS at 08:57

## 2018-08-06 RX ADMIN — PROPOFOL 100 MG: 10 INJECTION, EMULSION INTRAVENOUS at 11:13

## 2018-08-06 RX ADMIN — Medication 2 G: at 11:03

## 2018-08-06 RX ADMIN — ONDANSETRON 4 MG: 2 INJECTION INTRAMUSCULAR; INTRAVENOUS at 11:19

## 2018-08-06 NOTE — H&P (VIEW-ONLY)
Eliana Soriano MD, [facility], [address] [place], [place] [address]Dionne samaniegoprasad Hernandez  Phone [phone]   Fax [phone]      Date of visit: 7/24/2018     Primary/Requesting provider: Beulah Huerta MD    Chief Complaint   Patient presents with    New Patient     right breast ADH          HISTORY OF PRESENT ILLNESS  Dinesh Cardenas is a 53 y.o. female. HPI  Patient is a 53 y.o. female who presents for discussion of recent biopsy and surgical planning. She was found to have right sided suspicious calcs on her recent mammogram, which was her initial exam.  Subsequent core biopsy revealed ADH- unfortunately, she was told her diagnosis was DCIS. She reports severe bruising from the biopsy. 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 Medications:   Current Outpatient Prescriptions   Medication Sig    traZODone (DESYREL) 50 mg tablet Take 1-2 Tabs by mouth nightly.  sertraline (ZOLOFT) 100 mg tablet Take 1.5 Tabs by mouth daily.  naproxen (NAPROSYN) 500 mg tablet Take 1 Tab by mouth two (2) times daily (with meals).  mesalamine (LIALDA) 1.2 gram delayed release tablet Take  by mouth Daily (before breakfast).  glucosamine-chondroitin (ARTHX) 500-400 mg cap Take 1 Cap by mouth daily. No current facility-administered medications for this visit. Allergies:    Allergies   Allergen Reactions    Adhesive Tape-Silicones Rash    Codeine Unknown (comments)     Makes her \"wired\" hyper    Morphine Other (comments)     Grandmother had brain stem stroke with this    Penicillins Rash        Past History:  Past Medical History:   Diagnosis Date    Chronic pain     R knee    Colitis     Depression       Past Surgical History:   Procedure Laterality Date    ABDOMEN SURGERY PROC UNLISTED      enmanuel    HX BREAST BIOPSY Right 07/16/2018    HX CHOLECYSTECTOMY      HX GYN      C-sec X 1    HX GYN      JONE    HX ORTHOPAEDIC Right 06/2017    Knee        Family and Social History:  Family History   Problem Relation Age of Onset    Hypertension Mother     Heart Disease Mother      hx MVR    Hypertension Father     Hypertension Sister      Social History     Social History    Marital status:      Spouse name: N/A    Number of children: N/A    Years of education: N/A     Occupational History    Not on file. Social History Main Topics    Smoking status: Never Smoker    Smokeless tobacco: Never Used    Alcohol use No    Drug use: No    Sexual activity: Not on file     Other Topics Concern    Not on file     Social History Narrative       Review of Systems   Constitutional: Negative. HENT: Positive for hearing loss. Deaf right ear   Eyes:        Wears glasses   Respiratory: Negative. Cardiovascular: Negative. Gastrointestinal: Negative. Genitourinary: Negative. Musculoskeletal: Negative. Skin: Negative. Neurological: Negative. Endo/Heme/Allergies: Bruises/bleeds easily. Psychiatric/Behavioral: Positive for depression. Physical Exam   Constitutional: She appears well-developed and well-nourished. She is cooperative. Non-toxic appearance. HENT:   Head: Normocephalic and atraumatic. Mouth/Throat: Oropharynx is clear and moist.   Eyes: Conjunctivae and EOM are normal. Pupils are equal, round, and reactive to light. No scleral icterus. Neck: Normal range of motion. No JVD present. No tracheal deviation present. No thyromegaly present. Cardiovascular: Normal rate and regular rhythm. Exam reveals no gallop and no friction rub. No murmur heard. Pulmonary/Chest: Effort normal and breath sounds normal. No respiratory distress. She has no wheezes. She has no rales. Right breast exhibits mass, skin change and tenderness. Right breast exhibits no inverted nipple and no nipple discharge. Abdominal: Soft.    Musculoskeletal:   No gross deformities   Neurological: She is alert. No cranial nerve deficit. Skin: Skin is warm and dry. She is not diaphoretic. Psychiatric: She has a normal mood and affect. Vitals reviewed. [facility] Results (most recent):    Results from [place] encounter on 07/16/18   [facility] BREAST RT SURG [facility]   Narrative Stereotactic Breast Biopsy  Digital diagnostic mammogram  Specimen radiograph      INDICATION: Indeterminate right 1:00 calcifications    COMPARISON: Outside mammography 8 6/21/2018 and 6/15/2018 from [address]: After informed consent was obtained, the right 1:00 calcifications  were localized from a medial approach. The overlying skin was prepped and draped  in sterile fashion. Total of 7 mL of 2% Lidocaine was used for superficial  anesthesia. Using stereotactic guidance, a 9 gauge needle was advanced into the breast.  After appropriate position of the needle was confirmed with stereotactic  imaging, 6 core samples were obtained with vacuum assistance. A clip was left to  mia the biopsy site. The needle was withdrawn and hemostasis was obtained. Patient tolerated the procedure well without complications. Specimen Radiograph:  Multiple calcifications are present in the stereotactic  specimen. Postprocedure Mammogram:  Postprocedure right mammogram shows the biopsy clip in  expected location. Impression IMPRESSION: Successful vacuum-assisted stereotactic core biopsy of  calcifications in the right 1:00 breast.  Pathology results are pending. Pathology- report of recent biopsy reveals atypical ductal hyperplasia    ASSESSMENT and PLAN  Encounter Diagnoses   Name Primary?  Atypical ductal hyperplasia of right breast Yes     Ramifications and possible natural history of ADH reviewed with pt, daughter, . She was apparently mis-informed of her diagnosis and I suspect this is due to mention of DCIS in the comment section of the path report.     Recommendation for wide excision discussed  Likely referral to oncology to discuss risk reduction, regardless of surgical pathology, discussed, especially in light of mother's \"precancerous\" breast biopsy. 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 We have discussed the technical details of the procedure(s) in detail. Risks reviewed include anesthetic risks, bleeding, infection, wound healing problems and cosmetic abnormalities, need for further surgical intervention depending on pathology reports, lymphedema if axillary procedure planned, and recurrence. All questions are answered.

## 2018-08-06 NOTE — IP AVS SNAPSHOT
[address] [facility] [address] [address] 
[phone] Patient: Deja Barroso MRN: [id number] ANB:[birth year] About your hospitalization You were admitted on:  August 6, 2018 You last received care in the:  [facility] PACU You were discharged on:  August 6, 2018 Why you were hospitalized Your primary diagnosis was:  Not on File Follow-up Information Follow up With Details Comments Contact Info Marsha Umanzor MD   [address] [place] [address] 
[address] [facility] [address] 
[phone] Wallie Sicard, MD Call today  [address] [facility] [address] 
[phone] Your Scheduled Appointments Tuesday August 14, 2018  8:00 AM EDT Global Post Op with Wallie Sicard, MD  
[facility] ([address] [place] [address]) [address] [place] [id number] [phone] Discharge Orders None A check mia indicates which time of day the medication should be taken. My Medications START taking these medications Instructions Each Dose to Equal  
 Morning Noon Evening Bedtime HYDROcodone-acetaminophen 5-325 mg per tablet Commonly known as:  Thelbert Haslet Your last dose was: Your next dose is: Take 1-2 tabs po Q4-6hrs prn pain CHANGE how you take these medications Instructions Each Dose to Equal  
 Morning Noon Evening Bedtime  
 sertraline 100 mg tablet Commonly known as:  ZOLOFT What changed:  when to take this Your last dose was: Your next dose is: Take 1.5 Tabs by mouth daily. 150 mg CONTINUE taking these medications Instructions Each Dose to Equal  
 Morning Noon Evening Bedtime LIALDA 1.2 gram delayed release tablet Generic drug:  mesalamine Your last dose was: Your next dose is: Take  by mouth Daily (before breakfast). naproxen 500 mg tablet Commonly known as:  NAPROSYN Your last dose was: Your next dose is: Take 1 Tab by mouth two (2) times daily (with meals). 500 mg  
    
   
   
   
  
 traZODone 50 mg tablet Commonly known as:  Becky Shireen Your last dose was: Your next dose is: Take 1-2 Tabs by mouth nightly.  mg Where to Get Your Medications Information on where to get these meds will be given to you by the nurse or doctor. ! Ask your nurse or doctor about these medications HYDROcodone-acetaminophen 5-325 mg per tablet Opioid Education Prescription Opioids: What You Need to Know: 
 
 
ACTIVITY: 
? Try to take a few short walks with help around the house later today. It is very important to take short walks to avoid blood clots and pneumonia. ? You may be light-headed or sleepy from anesthesia, so be careful going up and down stairs. ? Avoid any activity that involves lifting your operative-side arm above your head until your follow-up appointment. We suggest wearing a tight-fitting bra continuously for the next 48 hours to minimize motion of the breast. 
 
DIET: 
? Start with clear, non-carbonated liquids when you get home (sugar-free if you are diabetic), such as Gatorade, chicken broth, etc., and you may advance to regular foods as you feel able. PAIN: 
? You will be given a prescription for pain medication. ? Try to take the pain medication with food, even a few crackers. ? You may also use Tylenol, Motrin, Advil, or Aleve instead of the prescription pain medication. Do no take Tylenol and the prescription pain medication within 3 hours of each other. ? URINARY RETENTION: If you are unable to empty your bladder within 6-8 hours after returning home, please go to your nearest Emergency Department or Urgent Care for urinary catheterization. WOUND CARE: 
? Please keep the dressing dry and intact for 5 days after surgery. You may then remove the tape and gauze;  at this time it is fine to get the incision wet,  The steri-strips will probably remain on your skin for several more days. ? It is not uncommon for the breast to have a bruised appearance in the region of the surgery; this will clear over several days. ? Incisions will sometimes develop redness around them as well as a hard lumpy feel. If this area of redness continues to get larger, please call the office. FOLLOW UP: 
? Your follow-up appointment is usually made when your surgery is arranged. Please call the office if you are not sure of this appointment. CALL THE DOCTOR IF: 
? You have a temperature higher than 101.5° Fahrenheit for more than 6 hours. ? You have severe nausea or vomiting. ? You develop increasing redness or infection at the incision. Continue home medications as previously prescribed. Introducing [facility]! [facility] introduces ZEFR patient portal. Now you can access parts of your medical record, email your doctor's office, and request medication refills online. 1. In your internet browser, go to [email or web address] 2. Click on the First Time User? Click Here link in the Sign In box. You will see the New Member Sign Up page. 3. Enter your ZEFR Access Code exactly as it appears below. You will not need to use this code after youve completed the sign-up process.  If you do not sign up before the expiration date, you must request a new code. 
 
· TrueLens Access Code: [email or web address] Expires: 10/3/2018  4:26 PM 
 
4. Enter the last four digits of your Social Security Number (xxxx) and Date of Birth (mm/dd/yyyy) as indicated and click Submit. You will be taken to the next sign-up page. 5. Create a Anaergiat ID. This will be your TrueLens login ID and cannot be changed, so think of one that is secure and easy to remember. 6. Create a TrueLens password. You can change your password at any time. 7. Enter your Password Reset Question and Answer. This can be used at a later time if you forget your password. 8. Enter your e-mail address. You will receive e-mail notification when new information is available in [address] 9. Click Sign Up. You can now view and download portions of your medical record. 10. Click the Download Summary menu link to download a portable copy of your medical information. If you have questions, please visit the Frequently Asked Questions section of the TrueLens website. Remember, TrueLens is NOT to be used for urgent needs. For medical emergencies, dial 911. Now available from your iPhone and Android! Introducing Richard Osei As a [facility] patient, I wanted to make you aware of our electronic visit tool called Richard Osei. [facility] 24/7 allows you to connect within minutes with a medical provider 24 hours a day, seven days a week via a mobile device or tablet or logging into a secure website from your computer. You can access Richard Osei from anywhere in the [place]. A virtual visit might be right for you when you have a simple condition and feel like you just dont want to get out of bed, or cant get away from work for an appointment, when your regular [facility] provider is not available (evenings, weekends or holidays), or when youre out of town and need minor care.   Electronic visits cost only $49 and if the [facility] 24/7 provider determines a prescription is needed to treat your condition, one can be electronically transmitted to a nearby pharmacy*. Please take a moment to enroll today if you have not already done so. The enrollment process is free and takes just a few minutes. To enroll, please download the [facility] [facility] 24/7 brigitte to your tablet or phone, or visit [email or web address] to enroll on your computer. And, as an [address] [place] [address] patient with a [employer]ia[employer] account, the results of your visits will be scanned into your electronic medical record and your primary care provider will be able to view the scanned results. We urge you to continue to see your regular [employer] provider for your ongoing medical care. And while your primary care provider may not be the one available when you seek a [employer] virtual visit, the peace of mind you get from getting a real diagnosis real time can be priceless. For more information on [employer], view our Frequently Asked Questions (FAQs) at [email or web address]. Sincerely, 
 
Yakelin Franklin MD 
Chief Medical Officer [facility] Lis Diaz *:  certain medications cannot be prescribed via [employer] Unresulted Labs-Please follow up with your PCP about these lab tests Order Current Status MARA BREAST RT SURG SPEC In process MARA PLC NDL/WIRE/CLIP/SEED BREAST RT In process Providers Seen During Your Hospitalization Provider Specialty Primary office phone Clorinda Rinne, MD General Surgery [phone] Your Primary Care Physician (PCP) Primary Care Physician Office Phone Office Fax Bin Aus [phone] You are allergic to the following Allergen Reactions Adhesive Tape-Silicones Rash Codeine Unknown (comments) Makes her \"wired\" hyper Morphine Other (comments) Grandmother had brain stem stroke with this Penicillins Rash Recent Documentation Height Weight BMI OB Status Smoking Status 1.537 m 58.6 kg 24.82 kg/m2 Hysterectomy Never Smoker Emergency Contacts Name Discharge Info Relation Home Work Mobile [address] CAREGIVER [3] Mother [14] [phone] Isha Rose DISCHARGE CAREGIVER [3] Daughter [21] [phone] Jihan Pathak CAREGIVER [3] Daughter [21] [phone] Patient Belongings The following personal items are in your possession at time of discharge: 
  Dental Appliances: None  Visual Aid: None      Home Medications: None   Jewelry: None  Clothing: Shirt, Pants, Footwear, Undergarments    Other Valuables: None Please provide this summary of care documentation to your next provider. Signatures-by signing, you are acknowledging that this After Visit Summary has been reviewed with you and you have received a copy. Patient Signature:  ____________________________________________________________ Date:  ____________________________________________________________  
  
Emmanuel Miguel Provider Signature:  ____________________________________________________________ Date:  ____________________________________________________________

## 2018-08-06 NOTE — OP NOTES
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       Procedure in Detail:  Prior to the operative procedure,  Tc Buchanan underwent wire localization to the Right breast in radiology. Films were reviewed. The patient was then brought to the operating room and placed on the table in a supine position with adequate padding to all pressure points and the arm extended laterally.  After induction of anesthesia, the operative site was prepped and draped in the usual fashion. A  curved incision was made in the area of the wire in the upper inner breast.  This was carried down into the subcutaneous level and  small superior and inferior flaps were created sharply. This resulted in exposure of the post-core biopsy hematoma, which was evacuated with suction. Once this was evacuated, the localization wire was free-floating and thus was removed. Dissection then extended down the wire to the depth of interest where the area was excised circumferentially, marked in the usual manner, and was imaged. As occurs with hematomas, the clip was not in the specimen but was identified in the suction canister. The cavity was irrigated and  hemostasis was achieved with cautery.  The area was infiltrated with local and closed with interrupted subcutaneous 3-0 Vicryl and 4-0 Vicryl subcuticular sutures.  Steri-Strips were applied to the wound.  A gauze pressure dressing was applied to the breast. Sponge and needle counts were correct times two.  The patient tolerated the procedure well and was transferred to recovery room in stable condition.               Specimens:   ID Type Source Tests Collected by Time Destination   1 : Right breast lumpectomy Fresh Breast  Jerad Nina MD 8/6/2018 1136 Pathology        Signed By: Jerad Nina MD     August 6, 2018

## 2018-08-06 NOTE — DISCHARGE INSTRUCTIONS
Fidel Zhang M.D.  [phone]    Instructions following Breast Surgery:    ACTIVITY:   Try to take a few short walks with help around the house later today. It is very important to take short walks to avoid blood clots and pneumonia.  You may be light-headed or sleepy from anesthesia, so be careful going up and down stairs.  Avoid any activity that involves lifting your operative-side arm above your head until your follow-up appointment. We suggest wearing a tight-fitting bra continuously for the next 48 hours to minimize motion of the breast.    DIET:   Start with clear, non-carbonated liquids when you get home (sugar-free if you are diabetic), such as Gatorade, chicken broth, etc., and you may advance to regular foods as you feel able. PAIN:   You will be given a prescription for pain medication.  Try to take the pain medication with food, even a few crackers.  You may also use Tylenol, Motrin, Advil, or Aleve instead of the prescription pain medication. Do no take Tylenol and the prescription pain medication within 3 hours of each other.  URINARY RETENTION: If you are unable to empty your bladder within 6-8 hours after returning home, please go to your nearest Emergency Department or Urgent Care for urinary catheterization. WOUND CARE:   Please keep the dressing dry and intact for 5 days after surgery. You may then remove the tape and gauze;  at this time it is fine to get the incision wet,  The steri-strips will probably remain on your skin for several more days.  It is not uncommon for the breast to have a bruised appearance in the region of the surgery; this will clear over several days.  Incisions will sometimes develop redness around them as well as a hard lumpy feel. If this area of redness continues to get larger, please call the office. FOLLOW UP:   Your follow-up appointment is usually made when your surgery is arranged.  Please call the office if you are not sure of this appointment. CALL THE DOCTOR IF:   You have a temperature higher than 101.5° Fahrenheit for more than 6 hours.  You have severe nausea or vomiting.  You develop increasing redness or infection at the incision. Continue home medications as previously prescribed.

## 2018-08-06 NOTE — INTERVAL H&P NOTE
H&P Update:  Valentine March was seen and examined. History and physical has been reviewed. The patient has been examined.  There have been no significant clinical changes since the completion of the originally dated History and Physical.    Signed By: Madeline Vu MD     August 6, 2018 10:37 AM

## 2018-08-07 NOTE — ANESTHESIA POSTPROCEDURE EVALUATION
Post-Anesthesia Evaluation and Assessment    Patient: Nelia Hartmann MRN: [id number]  SSN: [id number]    YOB: 1970  Age: 53 y.o. Sex: female       Cardiovascular Function/Vital Signs  Visit Vitals    /67    Pulse 78    Temp 36.1 °C (96.9 °F)    Resp 20    Ht 5' 0.5\" (1.537 m)    Wt 58.6 kg (129 lb 3.2 oz)    SpO2 99%    BMI 24.82 kg/m2       Patient is status post general anesthesia for Procedure(s):  RIGHT BREAST NEEDLE LOCALIZED BIOPSY. Nausea/Vomiting: None    Postoperative hydration reviewed and adequate. Pain:  Pain Scale 1: Visual (08/06/18 1310)  Pain Intensity 1: 0 (08/06/18 1310)   Managed    Neurological Status:   Neuro (WDL): Exceptions to WDL (08/06/18 1213)  Neuro  Neurologic State: Alert (08/06/18 1310)  Cognition: Follows commands (08/06/18 1310)  LUE Motor Response: Purposeful (08/06/18 1310)  LLE Motor Response: Purposeful (08/06/18 1310)  RUE Motor Response: Purposeful (08/06/18 1310)  RLE Motor Response: Purposeful (08/06/18 1310)   At baseline    Mental Status and Level of Consciousness: Arousable    Pulmonary Status:   O2 Device: Room air (08/06/18 1310)   Adequate oxygenation and airway patent    Complications related to anesthesia: None    Post-anesthesia assessment completed.  No concerns    Signed By: Shahriar Singh MD     August 7, 2018

## 2019-08-12 ENCOUNTER — HOSPITAL ENCOUNTER (OUTPATIENT)
Dept: MAMMOGRAPHY | Age: 49
Discharge: HOME | End: 2019-08-12
Attending: FAMILY MEDICINE

## 2019-08-12 DIAGNOSIS — Z12.39 SCREENING FOR BREAST CANCER: ICD-10-CM

## 2021-04-15 ENCOUNTER — HOSPITAL ENCOUNTER (OUTPATIENT)
Dept: ULTRASOUND IMAGING | Age: 51
Discharge: HOME | End: 2021-04-15
Attending: ORTHOPAEDIC SURGERY
Payer: COMMERCIAL

## 2021-04-15 ENCOUNTER — TRANSCRIBE ORDER (OUTPATIENT)
Dept: SCHEDULING | Age: 51
End: 2021-04-15

## 2021-04-15 DIAGNOSIS — M79.662 BILATERAL CALF PAIN: ICD-10-CM

## 2021-04-15 DIAGNOSIS — R60.0 LOCALIZED EDEMA: Primary | ICD-10-CM

## 2021-04-15 DIAGNOSIS — Z96.651 PRESENCE OF RIGHT ARTIFICIAL KNEE JOINT: ICD-10-CM

## 2021-04-15 DIAGNOSIS — M79.661 BILATERAL CALF PAIN: ICD-10-CM

## 2021-04-15 DIAGNOSIS — R60.0 LOCALIZED EDEMA: ICD-10-CM

## 2021-04-15 PROCEDURE — 93970 EXTREMITY STUDY: CPT

## 2021-06-21 ENCOUNTER — HOSPITAL ENCOUNTER (OUTPATIENT)
Dept: SLEEP MEDICINE | Age: 51
Discharge: HOME | End: 2021-06-21
Payer: COMMERCIAL

## 2021-06-21 PROCEDURE — 95810 POLYSOM 6/> YRS 4/> PARAM: CPT

## 2021-06-26 ENCOUNTER — HOSPITAL ENCOUNTER (OUTPATIENT)
Dept: MAMMOGRAPHY | Age: 51
Discharge: HOME | End: 2021-06-26
Attending: FAMILY MEDICINE
Payer: COMMERCIAL

## 2021-06-26 DIAGNOSIS — Z12.31 SCREENING MAMMOGRAM, ENCOUNTER FOR: ICD-10-CM

## 2021-06-26 PROCEDURE — 77067 SCR MAMMO BI INCL CAD: CPT

## 2021-07-13 ENCOUNTER — HOSPITAL ENCOUNTER (OUTPATIENT)
Dept: SLEEP MEDICINE | Age: 51
Discharge: HOME | End: 2021-07-13
Payer: COMMERCIAL

## 2021-07-13 PROCEDURE — 95811 POLYSOM 6/>YRS CPAP 4/> PARM: CPT

## 2021-09-02 PROBLEM — G47.34 NOCTURNAL HYPOXEMIA: Status: ACTIVE | Noted: 2021-09-02

## 2021-09-02 PROBLEM — E66.9 OBESITY (BMI 30.0-34.9): Status: ACTIVE | Noted: 2021-09-02

## 2021-09-02 PROBLEM — I10 HYPERTENSION: Status: ACTIVE | Noted: 2021-09-02

## 2021-09-02 PROBLEM — G47.33 OSA (OBSTRUCTIVE SLEEP APNEA): Status: ACTIVE | Noted: 2021-09-02

## 2021-10-01 ENCOUNTER — HOSPITAL ENCOUNTER (OUTPATIENT)
Dept: LAB | Age: 51
Discharge: HOME | End: 2021-10-01

## 2021-10-01 PROCEDURE — 88305 TISSUE EXAM BY PATHOLOGIST: CPT

## 2022-03-18 PROBLEM — G47.33 OSA (OBSTRUCTIVE SLEEP APNEA): Status: ACTIVE | Noted: 2021-09-02

## 2022-03-18 PROBLEM — F32.A MILD DEPRESSION: Status: ACTIVE | Noted: 2018-07-24

## 2022-03-19 PROBLEM — F32.9 REACTIVE DEPRESSION: Status: ACTIVE | Noted: 2017-12-27

## 2022-03-19 PROBLEM — E66.811 OBESITY (BMI 30.0-34.9): Status: ACTIVE | Noted: 2021-09-02

## 2022-03-19 PROBLEM — G47.34 NOCTURNAL HYPOXEMIA: Status: ACTIVE | Noted: 2021-09-02

## 2022-03-19 PROBLEM — F51.04 PSYCHOPHYSIOLOGICAL INSOMNIA: Status: ACTIVE | Noted: 2017-12-27

## 2022-03-19 PROBLEM — E66.9 OBESITY (BMI 30.0-34.9): Status: ACTIVE | Noted: 2021-09-02

## 2022-03-20 PROBLEM — I10 HYPERTENSION: Status: ACTIVE | Noted: 2021-09-02

## 2022-05-05 ENCOUNTER — HOSPITAL ENCOUNTER (OUTPATIENT)
Dept: MRI IMAGING | Age: 52
Discharge: HOME | End: 2022-05-05
Attending: FAMILY MEDICINE
Payer: COMMERCIAL

## 2022-05-05 DIAGNOSIS — G89.29 CHRONIC PAIN OF LEFT KNEE: ICD-10-CM

## 2022-05-05 DIAGNOSIS — M25.562 CHRONIC PAIN OF LEFT KNEE: ICD-10-CM

## 2022-05-05 PROCEDURE — 73721 MRI JNT OF LWR EXTRE W/O DYE: CPT

## 2022-05-16 DIAGNOSIS — Z00.00 ROUTINE GENERAL MEDICAL EXAMINATION AT A HEALTH CARE FACILITY: Primary | ICD-10-CM

## 2022-05-24 ENCOUNTER — OFFICE VISIT (OUTPATIENT)
Dept: ORTHOPEDIC SURGERY | Age: 52
End: 2022-05-24
Payer: COMMERCIAL

## 2022-05-24 VITALS — BODY MASS INDEX: 34.55 KG/M2 | HEIGHT: 60 IN | WEIGHT: 176 LBS

## 2022-05-24 DIAGNOSIS — S83.242A ACUTE MEDIAL MENISCUS TEAR, LEFT, INITIAL ENCOUNTER: Primary | ICD-10-CM

## 2022-05-24 DIAGNOSIS — S83.412A SPRAIN OF MEDIAL COLLATERAL LIGAMENT OF LEFT KNEE, INITIAL ENCOUNTER: ICD-10-CM

## 2022-05-24 DIAGNOSIS — M25.562 LEFT KNEE PAIN, UNSPECIFIED CHRONICITY: ICD-10-CM

## 2022-05-24 PROCEDURE — 99204 OFFICE O/P NEW MOD 45 MIN: CPT | Performed by: ORTHOPAEDIC SURGERY

## 2022-05-24 RX ORDER — LORATADINE 10 MG/1
10 CAPSULE, LIQUID FILLED ORAL DAILY
COMMUNITY

## 2022-05-24 RX ORDER — MULTIVIT-MIN/IRON/FOLIC ACID/K 18-600-40
CAPSULE ORAL
COMMUNITY

## 2022-05-24 RX ORDER — DICLOFENAC SODIUM 75 MG/1
75 TABLET, DELAYED RELEASE ORAL 2 TIMES DAILY
COMMUNITY
End: 2022-06-08

## 2022-05-24 NOTE — PROGRESS NOTES
Name: Adalberto Kaur  YOB: 1970  Gender: female  MRN: [id number]      CC: Knee Pain (L knee)       HPI: Adalberto Kaur is a 49 y.o. female who presents with Knee Pain (L knee)  Sheyla Maher is a new patient to me who presents today with left knee pain, ongoing for several months now. She vaguely recalls slipping in some water 4-5 months ago but doesn't know if that is what caused her knee pain. She was prescribed Diclofenac by her PCP which she says doesn't help. She reports a long history of right knee pain that began after she had a right knee replacement several years ago. She reports that she has permanent nerve damage since that surgery and is in pain management for her continued pain. She reports today with an MRI for my review. ROS/Meds/PSH/PMH/FH/SH: I personally reviewed the patients standard intake form. Below are the pertinents    Tobacco:  reports that she has never smoked. She has never used smokeless tobacco.  Diabetes: none  Other: HTN, depression    Physical Examination:  General: no acute distress  Lungs: breathing easily  CV: regular rhythm by pulse  Left Knee: Minimal effusion present. Tenderness to palpation the medial joint line. Full active and passive range of motion with pain in the extremes of extension. Mild laxity present with valgus stress at 30 degrees, stable at 0 degrees. Stable to varus stress at 0 and 30 degrees. Negative Lachman's, anterior drawer. Pain with Young's and reproduction of symptoms in the medial joint line      Imaging:   Knee XR: 4 views     Clinical Indication  1. Acute medial meniscus tear, left, initial encounter    2. Left knee pain, unspecified chronicity    3.  Sprain of medial collateral ligament of left knee, initial encounter           Report: AP, lateral, PA flexion, sunrise views of the Left knee demonstrates no acute fracture dislocation moderate degenerative changes localized in the medial compartment    Impression: Moderate DJD as above          I also reviewed an MRI performed in our system on 5/6/2022 which shows horizontal undersurface tear of the medial meniscus body with a small volume inferiorly displaced meniscal tissue along the tibial edge in the region of the medial meniscotibial ligament with likely a flipped fragment. Grade 1 MCL sprain. Moderate medial and mild patellofemoral compartment chondral loss. Small Baker's cyst.    All imaging interpreted by myself Thong Snyder MD independent of radiology review        Assessment:   1. Acute medial meniscus tear, left, initial encounter    2. Left knee pain, unspecified chronicity    3. Sprain of medial collateral ligament of left knee, initial encounter         Plan:   I reviewed the MRI results with the patient and she has a clinical exam consistent with findings on MRI of a grade 1 MCL sprain and a medial meniscus tear. I think the majority of her symptoms are from the medial meniscal tear given her history and exam findings. I discussed conservative treatment options to include corticosteroid injection and formal physical therapy however patient wishes to have this addressed definitively which I think is reasonable. We discussed the details risks and benefits of knee arthroscopy with meniscal debridement and possible chondroplasty including but not limited to anesthetic complications bleeding infection postoperative DVT/PE continued pain further progression of degenerative changes and incomplete resolution of symptoms as well as the possible need for further surgery in the rehab course and recovery period that is expected. Patient elects to proceed as planned after all of their questions have been answered. Surgical plan to be for left knee arthroscopy medial meniscus debridement. 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   We discussed we will attempt to do this procedure without tourniquet but she may still have some saphenous nerve irritation from the arthroscopy portals and the procedure. She expressed understanding and elects to proceed as planned. Ana Maria Avila NP dictating as a scribe for MD Anastasiia Mcknight.  Stephanie Krueger MD, [facility]

## 2022-05-25 ENCOUNTER — TELEPHONE (OUTPATIENT)
Dept: FAMILY MEDICINE CLINIC | Facility: CLINIC | Age: 52
End: 2022-05-25

## 2022-05-25 NOTE — TELEPHONE ENCOUNTER
----- Message from [facility] sent at 5/23/2022  1:46 PM EDT -----  Subject: Message to Provider    QUESTIONS  Information for Provider? patient waiting on return call from office about   Sleep Study was in office 5/16/22 has not heard anything yet please   contact her about getting an appointment she can't remember the lady name   that was helping her @ Dr. Eleanor Fierro office she thinks it starts with a Y   ---------------------------------------------------------------------------  --------------  [address]  What is the best way for the office to contact you? OK to leave message on   voicemail, OK to respond with electronic message via NPTV portal (only   for patients who have registered NPTV account)  Preferred Call Back Phone Number?  [phone]  ---------------------------------------------------------------------------  --------------  SCRIPT ANSWERS  undefined

## 2022-05-26 PROBLEM — M25.562 LEFT KNEE PAIN: Status: ACTIVE | Noted: 2022-05-26

## 2022-05-27 DIAGNOSIS — M25.562 PAIN IN LEFT KNEE: ICD-10-CM

## 2022-05-27 RX ORDER — DICLOFENAC SODIUM 75 MG/1
TABLET, DELAYED RELEASE ORAL
Qty: 60 TABLET | OUTPATIENT
Start: 2022-05-27

## 2022-06-06 RX ORDER — SODIUM CHLORIDE 9 MG/ML
INJECTION, SOLUTION INTRAVENOUS PRN
Status: CANCELLED | OUTPATIENT
Start: 2022-06-06

## 2022-06-07 ENCOUNTER — OFFICE VISIT (OUTPATIENT)
Dept: FAMILY MEDICINE CLINIC | Facility: CLINIC | Age: 52
End: 2022-06-07
Payer: COMMERCIAL

## 2022-06-07 ENCOUNTER — OFFICE VISIT (OUTPATIENT)
Dept: ORTHOPEDIC SURGERY | Age: 52
End: 2022-06-07

## 2022-06-07 VITALS
SYSTOLIC BLOOD PRESSURE: 110 MMHG | DIASTOLIC BLOOD PRESSURE: 60 MMHG | HEIGHT: 60 IN | WEIGHT: 179 LBS | BODY MASS INDEX: 35.14 KG/M2

## 2022-06-07 DIAGNOSIS — S83.412D SPRAIN OF MEDIAL COLLATERAL LIGAMENT OF LEFT KNEE, SUBSEQUENT ENCOUNTER: ICD-10-CM

## 2022-06-07 DIAGNOSIS — S83.242D ACUTE MEDIAL MENISCUS TEAR OF LEFT KNEE, SUBSEQUENT ENCOUNTER: Primary | ICD-10-CM

## 2022-06-07 DIAGNOSIS — L81.9 PIGMENTED SKIN LESION OF SUSPECTED MALIGNANT NATURE: ICD-10-CM

## 2022-06-07 DIAGNOSIS — I10 PRIMARY HYPERTENSION: Primary | ICD-10-CM

## 2022-06-07 DIAGNOSIS — M25.562 LEFT KNEE PAIN, UNSPECIFIED CHRONICITY: ICD-10-CM

## 2022-06-07 PROCEDURE — 99213 OFFICE O/P EST LOW 20 MIN: CPT | Performed by: FAMILY MEDICINE

## 2022-06-07 PROCEDURE — 99024 POSTOP FOLLOW-UP VISIT: CPT | Performed by: SPECIALIST/TECHNOLOGIST

## 2022-06-07 RX ORDER — HYDROCODONE BITARTRATE AND ACETAMINOPHEN 5; 325 MG/1; MG/1
1 TABLET ORAL EVERY 4 HOURS PRN
Qty: 15 TABLET | Refills: 0 | Status: SHIPPED | OUTPATIENT
Start: 2022-06-07 | End: 2022-06-08

## 2022-06-07 RX ORDER — VALSARTAN 80 MG/1
80 TABLET ORAL DAILY
COMMUNITY
End: 2022-06-07 | Stop reason: SDUPTHER

## 2022-06-07 RX ORDER — VALSARTAN 80 MG/1
80 TABLET ORAL DAILY
Qty: 90 TABLET | Refills: 3 | Status: SHIPPED | OUTPATIENT
Start: 2022-06-07

## 2022-06-07 ASSESSMENT — ENCOUNTER SYMPTOMS
SHORTNESS OF BREATH: 0
COLOR CHANGE: 1

## 2022-06-07 NOTE — PROGRESS NOTES
PROGRESS NOTE    SUBJECTIVE:   Jessi Prater is a 49 y.o. female seen for a follow up visit regarding the following chief complaint:     Chief Complaint   Patient presents with    Skin Tag     skin moles on R breast, scalp     Medication Problem     pt wants a BP med w/o diuretic           HPI presents office for blood pressure check states that the water pill part of her losartan HCTZ is causing her to urinate too much her blood pressure is actually pretty good so we will discontinue the hydrochlorothiazide and just keep her on losartan      Past Medical History, Past Surgical History, Family history, Social History, and Medications were all reviewed with the patient today and updated as necessary. Current Outpatient Medications   Medication Sig Dispense Refill    HYDROcodone-acetaminophen (NORCO) 5-325 MG per tablet Take 1 tablet by mouth every 4 hours as needed for Pain for up to 5 days. Intended supply: 5 days. Take lowest dose possible to manage pain 15 tablet 0    valsartan (DIOVAN) 80 mg tablet Take 1 tablet by mouth daily 90 tablet 3    diclofenac (VOLTAREN) 75 MG EC tablet Take 75 mg by mouth 2 times daily      Cholecalciferol (VITAMIN D) 50 MCG (2000 UT) CAPS capsule Take by mouth      loratadine (CLARITIN) 10 MG capsule Take 10 mg by mouth daily      amLODIPine (NORVASC) 10 MG tablet Take 10 mg by mouth daily      lidocaine (LIDODERM) 5 % Place onto the skin every 24 hours      mesalamine (LIALDA) 1.2 g EC tablet TAKE 2 TABLETS BY MOUTH EVERY DAY WITH A MEAL      mirabegron (MYRBETRIQ) 50 MG TB24 Take 50 mg by mouth daily      pregabalin (LYRICA) 75 MG capsule Take 75 mg by mouth 2 times daily.  traZODone (DESYREL) 50 MG tablet Take 2 tab at bedtime       No current facility-administered medications for this visit.      Allergies   Allergen Reactions    Codeine Other (See Comments)     Makes her \"wired\" hyper    Penicillins Rash    Morphine Other (See Comments) Grandmother had brain stem stroke with this     Patient Active Problem List   Diagnosis    Mild depression ([address]    KELSEA (obstructive sleep apnea)    Psychophysiological insomnia    Obesity (BMI 30.0-34. 9)    Nocturnal hypoxemia    Reactive depression    Hypertension    Acute medial meniscus tear, left, initial encounter    Left knee pain     Past Medical History:   Diagnosis Date    Atypical ductal hyperplasia of right breast     Chronic pain     R knee    Colitis     Depression     Ulcerative colitis ([address]      Past Surgical History:   Procedure Laterality Date    BREAST BIOPSY Right 8/6/2018    RIGHT BREAST NEEDLE LOCALIZED BIOPSY performed by Zuleima Betancourt MD at [facility] Right 07/16/2018    CHOLECYSTECTOMY      GYN      ELIZABETH    GYN      C-sec X 1    VANESSA STEROTACTIC LOC BREAST BIOPSY RIGHT Right 7/16/2018    VANESSA STEROTACTIC LOC BREAST BIOPSY RIGHT 7/16/2018 [facility]    ORTHOPEDIC SURGERY Right 06/2017    Knee     Family History   Problem Relation Age of Onset    Breast Cancer Neg Hx     Hypertension Sister     Hypertension Father     Hypertension Mother     Heart Disease Mother         hx MVR     Social History     Tobacco Use    Smoking status: Never Smoker    Smokeless tobacco: Never Used   Substance Use Topics    Alcohol use: No         Review of Systems   Constitutional: Negative for fatigue and fever. Respiratory: Negative for shortness of breath. Cardiovascular: Negative for chest pain. Skin: Positive for color change. OBJECTIVE:  /60 (Site: Left Upper Arm, Position: Sitting, Cuff Size: Small Adult)   Ht 5' (1.524 m)   Wt 179 lb (81.2 kg)   BMI 34.96 kg/m²      Physical Exam  Nursing note reviewed. Constitutional:       General: She is not in acute distress. Appearance: Normal appearance. HENT:      Head: Normocephalic and atraumatic. Cardiovascular:      Rate and Rhythm: Normal rate and regular rhythm. Heart sounds: Normal heart sounds. Pulmonary:      Effort: Pulmonary effort is normal.      Breath sounds: Normal breath sounds. Skin:     General: Skin is warm and dry. Findings: Lesion present. Neurological:      General: No focal deficit present. Mental Status: She is alert and oriented to person, place, and time. Psychiatric:         Mood and Affect: Mood normal.         Behavior: Behavior normal.         Thought Content: Thought content normal.         Judgment: Judgment normal.          Medical problems and test results were reviewed with the patient today. No results found for this or any previous visit (from the past 672 hour(s)). ASSESSMENT and PLAN    Visit Diagnoses and Associated Orders     Primary hypertension    -  Primary    valsartan (DIOVAN) 80 mg tablet [[id number]]           Pigmented skin lesion of suspected malignant nature        AFL - Monster Basurto MD, VALERIA Palacios was seen today for skin tag and medication problem. Diagnoses and all orders for this visit:    Primary hypertension  -     valsartan (DIOVAN) 80 mg tablet;  Take 1 tablet by mouth daily    Pigmented skin lesion of suspected malignant nature  -     A[facility] - Monster Basurto MD, PA

## 2022-06-07 NOTE — H&P (VIEW-ONLY)
Name: Cornel Damon  YOB: 1970  Gender: female  MRN: [id number]      CC: Knee Pain (L knee pre-op)       HPI: Cornel Damon is a 49 y.o. female who returns for a pre-operative appointment. She is scheduled for a left knee medial meniscectomy on 6/10/22 with Dr. Rob Goldman. He reports continued left knee pain and would like to proceed with surgery. Current Outpatient Medications:     diclofenac (VOLTAREN) 75 MG EC tablet, Take 75 mg by mouth 2 times daily, Disp: , Rfl:     Cholecalciferol (VITAMIN D) 50 MCG (2000 UT) CAPS capsule, Take by mouth, Disp: , Rfl:     loratadine (CLARITIN) 10 MG capsule, Take 10 mg by mouth daily, Disp: , Rfl:     amLODIPine (NORVASC) 10 MG tablet, Take 10 mg by mouth daily, Disp: , Rfl:     lidocaine (LIDODERM) 5 %, Place onto the skin every 24 hours, Disp: , Rfl:     mesalamine (LIALDA) 1.2 g EC tablet, TAKE 2 TABLETS BY MOUTH EVERY DAY WITH A MEAL, Disp: , Rfl:     mirabegron (MYRBETRIQ) 50 MG TB24, Take 50 mg by mouth daily, Disp: , Rfl:     pregabalin (LYRICA) 75 MG capsule, Take 75 mg by mouth 2 times daily. , Disp: , Rfl:     traZODone (DESYREL) 50 MG tablet, Take 2 tab at bedtime, Disp: , Rfl:   Allergies   Allergen Reactions    Codeine Other (See Comments)     Makes her \"wired\" hyper    Penicillins Rash    Morphine Other (See Comments)     Grandmother had brain stem stroke with this     Past Medical History:   Diagnosis Date    Atypical ductal hyperplasia of right breast     Chronic pain     R knee    Colitis     Depression     [address] [address]      Past Surgical History:   Procedure Laterality Date    BREAST BIOPSY Right 8/6/2018    RIGHT BREAST NEEDLE LOCALIZED BIOPSY performed by Gen Cooper MD at [facility] Right 07/16/2018    CHOLECYSTECTOMY      GYN      ELIZABETH    GYN      C-sec X 1    VANESSA STEROTACTIC LOC BREAST BIOPSY RIGHT Right 7/16/2018    VANESSA STEROTACTIC LOC BREAST BIOPSY RIGHT 7/16/2018 [facility]    ORTHOPEDIC SURGERY Right 06/2017    Knee     Family History   Problem Relation Age of Onset    Breast Cancer Neg Hx     Hypertension Sister     Hypertension Father     Hypertension Mother     Heart Disease Mother         hx MVR     Social History     Socioeconomic History    Marital status:      Spouse name: Not on file    Number of children: Not on file    Years of education: Not on file    Highest education level: Not on file   Occupational History    Not on file   Tobacco Use    Smoking status: Never Smoker    Smokeless tobacco: Never Used   Substance and Sexual Activity    Alcohol use: No    Drug use: No    Sexual activity: Not on file   Other Topics Concern    Not on file   Social History Narrative    Not on file     Social Determinants of Health     Financial Resource Strain:     Difficulty of Paying Living Expenses: Not on file   Food Insecurity:     Worried About [employer] [employer] in the Last Year: Not on file    [employer] in the Last Year: Not on file   Transportation Needs:     Lack of Transportation (Medical): Not on file    Lack of Transportation (Non-Medical):  Not on file   Physical Activity:     Days of Exercise per Week: Not on file    Minutes of Exercise per Session: Not on file   Stress:     Feeling of Stress : Not on file   Social Connections:     Frequency of Communication with Friends and Family: Not on file    Frequency of Social Gatherings with Friends and Family: Not on file    Attends Caodaism Services: Not on file    Active Member of Clubs or Organizations: Not on file    Attends Club or Organization Meetings: Not on file    Marital Status: Not on file   Intimate Partner Violence:     Fear of Current or Ex-Partner: Not on file    Emotionally Abused: Not on file    Physically Abused: Not on file    Sexually Abused: Not on file   Housing Stability:     Unable to Pay for Housing in the Last Year: Not on file  Number of Places Lived in the Last Year: Not on file    Unstable Housing in the Last Year: Not on file           Physical Examination:  General: no acute distress  Lungs: breathing easily, CTAB  HEENT: NCAT  Abdomen: soft, non-tender  CV: regular rhythm by pulse, S1/S2 by auscultation  Left Knee: Moderate effusion present. Tenderness to palpation medial joint line. Full active and passive range of motion with pain in the extremes. Mild pain in the medial joint line with Young's. Negative bounce home test.        Assessment:     ICD-10-CM    1. Acute medial meniscus tear of left knee, subsequent encounter  S83.242D    2. Left knee pain, unspecified chronicity  M25.562    3. Sprain of medial collateral ligament of left knee, subsequent encounter  S83.412D        Plan:   Surgical plan to be for left knee arthroscopy medial meniscus debridement. I discussed with the patient the importance of bringing all equipment with her the day of surgery to be placed on the OR. I sent her a postoperative pain prescription to her pharmacy today and instructed her to pick it up within the next 5 days to prevent expiration. Discussed the rehab and recovery involved with the procedure and she understands and all questions have been answered. We discussed the risks of the procedure including but not limited to infection, bleeding, anesthetic complications postoperative DVT/PE.   All of his questions have been answered and they elect to proceed as planned            ISABEL Weeks  Orthopaedics and Sports Medicine

## 2022-06-07 NOTE — PROGRESS NOTES
7/16/2018 [facility]    ORTHOPEDIC SURGERY Right 06/2017    Knee     Family History   Problem Relation Age of Onset    Breast Cancer Neg Hx     Hypertension Sister     Hypertension Father     Hypertension Mother     Heart Disease Mother         hx MVR     Social History     Socioeconomic History    Marital status:      Spouse name: Not on file    Number of children: Not on file    Years of education: Not on file    Highest education level: Not on file   Occupational History    Not on file   Tobacco Use    Smoking status: Never Smoker    Smokeless tobacco: Never Used   Substance and Sexual Activity    Alcohol use: No    Drug use: No    Sexual activity: Not on file   Other Topics Concern    Not on file   Social History Narrative    Not on file     Social Determinants of Health     Financial Resource Strain:     Difficulty of Paying Living Expenses: Not on file   Food Insecurity:     Worried About [address] [employer] in the Last Year: Not on file    [employer] in the Last Year: Not on file   Transportation Needs:     Lack of Transportation (Medical): Not on file    Lack of Transportation (Non-Medical):  Not on file   Physical Activity:     Days of Exercise per Week: Not on file    Minutes of Exercise per Session: Not on file   Stress:     Feeling of Stress : Not on file   Social Connections:     Frequency of Communication with Friends and Family: Not on file    Frequency of Social Gatherings with Friends and Family: Not on file    Attends Judaism Services: Not on file    Active Member of Clubs or Organizations: Not on file    Attends Club or Organization Meetings: Not on file    Marital Status: Not on file   Intimate Partner Violence:     Fear of Current or Ex-Partner: Not on file    Emotionally Abused: Not on file    Physically Abused: Not on file    Sexually Abused: Not on file   Housing Stability:     Unable to Pay for Housing in the Last Year: Not on file  Number of Places Lived in the Last Year: Not on file    Unstable Housing in the Last Year: Not on file           Physical Examination:  General: no acute distress  Lungs: breathing easily, CTAB  HEENT: NCAT  Abdomen: soft, non-tender  CV: regular rhythm by pulse, S1/S2 by auscultation  Left Knee: Moderate effusion present. Tenderness to palpation medial joint line. Full active and passive range of motion with pain in the extremes. Mild pain in the medial joint line with Young's. Negative bounce home test.        Assessment:     ICD-10-CM    1. Acute medial meniscus tear of left knee, subsequent encounter  S83.242D    2. Left knee pain, unspecified chronicity  M25.562    3. Sprain of medial collateral ligament of left knee, subsequent encounter  S83.412D        Plan:   Surgical plan to be for left knee arthroscopy medial meniscus debridement. I discussed with the patient the importance of bringing all equipment with her the day of surgery to be placed on the OR. I sent her a postoperative pain prescription to her pharmacy today and instructed her to pick it up within the next 5 days to prevent expiration. Discussed the rehab and recovery involved with the procedure and she understands and all questions have been answered. We discussed the risks of the procedure including but not limited to infection, bleeding, anesthetic complications postoperative DVT/PE.   All of his questions have been answered and they elect to proceed as planned            ISABEL Selby  Orthopaedics and Sports Medicine

## 2022-06-08 RX ORDER — PREGABALIN 50 MG/1
50 CAPSULE ORAL NIGHTLY
COMMUNITY

## 2022-06-08 RX ORDER — LIDOCAINE 36 MG/1
PATCH TOPICAL EVERY 12 HOURS
COMMUNITY

## 2022-06-08 RX ORDER — HYDROCODONE BITARTRATE AND ACETAMINOPHEN 5; 325 MG/1; MG/1
1 TABLET ORAL EVERY 6 HOURS PRN
COMMUNITY
End: 2022-08-09 | Stop reason: ALTCHOICE

## 2022-06-09 ENCOUNTER — ANESTHESIA EVENT (OUTPATIENT)
Dept: SURGERY | Age: 52
End: 2022-06-09
Payer: COMMERCIAL

## 2022-06-10 ENCOUNTER — HOSPITAL ENCOUNTER (OUTPATIENT)
Age: 52
Setting detail: OUTPATIENT SURGERY
Discharge: HOME | End: 2022-06-10
Attending: ORTHOPAEDIC SURGERY | Admitting: ORTHOPAEDIC SURGERY
Payer: COMMERCIAL

## 2022-06-10 ENCOUNTER — ANESTHESIA (OUTPATIENT)
Dept: SURGERY | Age: 52
End: 2022-06-10
Payer: COMMERCIAL

## 2022-06-10 VITALS
WEIGHT: 176 LBS | HEIGHT: 61 IN | BODY MASS INDEX: 33.23 KG/M2 | DIASTOLIC BLOOD PRESSURE: 65 MMHG | OXYGEN SATURATION: 93 % | SYSTOLIC BLOOD PRESSURE: 134 MMHG | HEART RATE: 74 BPM | RESPIRATION RATE: 15 BRPM | TEMPERATURE: 98 F

## 2022-06-10 PROCEDURE — 6360000002 HC RX W HCPCS: Performed by: ANESTHESIOLOGY

## 2022-06-10 PROCEDURE — 7100000001 HC PACU RECOVERY - ADDTL 15 MIN: Performed by: ORTHOPAEDIC SURGERY

## 2022-06-10 PROCEDURE — 6360000002 HC RX W HCPCS: Performed by: NURSE ANESTHETIST, CERTIFIED REGISTERED

## 2022-06-10 PROCEDURE — 7100000000 HC PACU RECOVERY - FIRST 15 MIN: Performed by: ORTHOPAEDIC SURGERY

## 2022-06-10 PROCEDURE — 7100000011 HC PHASE II RECOVERY - ADDTL 15 MIN: Performed by: ORTHOPAEDIC SURGERY

## 2022-06-10 PROCEDURE — 3600000014 HC SURGERY LEVEL 4 ADDTL 15MIN: Performed by: ORTHOPAEDIC SURGERY

## 2022-06-10 PROCEDURE — 3700000000 HC ANESTHESIA ATTENDED CARE: Performed by: ORTHOPAEDIC SURGERY

## 2022-06-10 PROCEDURE — 6360000002 HC RX W HCPCS: Performed by: ORTHOPAEDIC SURGERY

## 2022-06-10 PROCEDURE — 2709999900 HC NON-CHARGEABLE SUPPLY: Performed by: ORTHOPAEDIC SURGERY

## 2022-06-10 PROCEDURE — 3600000004 HC SURGERY LEVEL 4 BASE: Performed by: ORTHOPAEDIC SURGERY

## 2022-06-10 PROCEDURE — 3700000001 HC ADD 15 MINUTES (ANESTHESIA): Performed by: ORTHOPAEDIC SURGERY

## 2022-06-10 PROCEDURE — 7100000010 HC PHASE II RECOVERY - FIRST 15 MIN: Performed by: ORTHOPAEDIC SURGERY

## 2022-06-10 PROCEDURE — 6370000000 HC RX 637 (ALT 250 FOR IP): Performed by: ANESTHESIOLOGY

## 2022-06-10 PROCEDURE — 2500000003 HC RX 250 WO HCPCS: Performed by: NURSE ANESTHETIST, CERTIFIED REGISTERED

## 2022-06-10 PROCEDURE — 2580000003 HC RX 258: Performed by: ANESTHESIOLOGY

## 2022-06-10 PROCEDURE — 29881 ARTHRS KNE SRG MNISECTMY M/L: CPT | Performed by: ORTHOPAEDIC SURGERY

## 2022-06-10 PROCEDURE — 6360000002 HC RX W HCPCS: Performed by: SPECIALIST/TECHNOLOGIST

## 2022-06-10 RX ORDER — SODIUM CHLORIDE 9 MG/ML
INJECTION, SOLUTION INTRAVENOUS PRN
Status: DISCONTINUED | OUTPATIENT
Start: 2022-06-10 | End: 2022-06-10 | Stop reason: HOSPADM

## 2022-06-10 RX ORDER — ROPIVACAINE HYDROCHLORIDE 5 MG/ML
INJECTION, SOLUTION EPIDURAL; INFILTRATION; PERINEURAL PRN
Status: DISCONTINUED | OUTPATIENT
Start: 2022-06-10 | End: 2022-06-10 | Stop reason: ALTCHOICE

## 2022-06-10 RX ORDER — SODIUM CHLORIDE 0.9 % (FLUSH) 0.9 %
5-40 SYRINGE (ML) INJECTION EVERY 12 HOURS SCHEDULED
Status: DISCONTINUED | OUTPATIENT
Start: 2022-06-10 | End: 2022-06-10 | Stop reason: HOSPADM

## 2022-06-10 RX ORDER — OXYCODONE HYDROCHLORIDE 5 MG/1
10 TABLET ORAL PRN
Status: COMPLETED | OUTPATIENT
Start: 2022-06-10 | End: 2022-06-10

## 2022-06-10 RX ORDER — DEXAMETHASONE SODIUM PHOSPHATE 4 MG/ML
INJECTION, SOLUTION INTRA-ARTICULAR; INTRALESIONAL; INTRAMUSCULAR; INTRAVENOUS; SOFT TISSUE PRN
Status: DISCONTINUED | OUTPATIENT
Start: 2022-06-10 | End: 2022-06-10 | Stop reason: SDUPTHER

## 2022-06-10 RX ORDER — FENTANYL CITRATE 50 UG/ML
INJECTION, SOLUTION INTRAMUSCULAR; INTRAVENOUS PRN
Status: DISCONTINUED | OUTPATIENT
Start: 2022-06-10 | End: 2022-06-10 | Stop reason: SDUPTHER

## 2022-06-10 RX ORDER — HYDROMORPHONE HYDROCHLORIDE 2 MG/ML
0.25 INJECTION, SOLUTION INTRAMUSCULAR; INTRAVENOUS; SUBCUTANEOUS EVERY 5 MIN PRN
Status: DISCONTINUED | OUTPATIENT
Start: 2022-06-10 | End: 2022-06-10 | Stop reason: HOSPADM

## 2022-06-10 RX ORDER — SODIUM CHLORIDE, SODIUM LACTATE, POTASSIUM CHLORIDE, CALCIUM CHLORIDE 600; 310; 30; 20 MG/100ML; MG/100ML; MG/100ML; MG/100ML
INJECTION, SOLUTION INTRAVENOUS CONTINUOUS
Status: DISCONTINUED | OUTPATIENT
Start: 2022-06-10 | End: 2022-06-10 | Stop reason: HOSPADM

## 2022-06-10 RX ORDER — SODIUM CHLORIDE 0.9 % (FLUSH) 0.9 %
5-40 SYRINGE (ML) INJECTION PRN
Status: DISCONTINUED | OUTPATIENT
Start: 2022-06-10 | End: 2022-06-10 | Stop reason: HOSPADM

## 2022-06-10 RX ORDER — MIDAZOLAM HYDROCHLORIDE 1 MG/ML
2 INJECTION INTRAMUSCULAR; INTRAVENOUS ONCE
Status: COMPLETED | OUTPATIENT
Start: 2022-06-10 | End: 2022-06-10

## 2022-06-10 RX ORDER — LIDOCAINE HYDROCHLORIDE 20 MG/ML
INJECTION, SOLUTION EPIDURAL; INFILTRATION; INTRACAUDAL; PERINEURAL PRN
Status: DISCONTINUED | OUTPATIENT
Start: 2022-06-10 | End: 2022-06-10 | Stop reason: SDUPTHER

## 2022-06-10 RX ORDER — HYDROMORPHONE HYDROCHLORIDE 2 MG/ML
0.5 INJECTION, SOLUTION INTRAMUSCULAR; INTRAVENOUS; SUBCUTANEOUS EVERY 5 MIN PRN
Status: DISCONTINUED | OUTPATIENT
Start: 2022-06-10 | End: 2022-06-10 | Stop reason: HOSPADM

## 2022-06-10 RX ORDER — ONDANSETRON 2 MG/ML
INJECTION INTRAMUSCULAR; INTRAVENOUS PRN
Status: DISCONTINUED | OUTPATIENT
Start: 2022-06-10 | End: 2022-06-10 | Stop reason: SDUPTHER

## 2022-06-10 RX ORDER — OXYCODONE HYDROCHLORIDE 5 MG/1
5 TABLET ORAL PRN
Status: COMPLETED | OUTPATIENT
Start: 2022-06-10 | End: 2022-06-10

## 2022-06-10 RX ORDER — PROPOFOL 10 MG/ML
INJECTION, EMULSION INTRAVENOUS PRN
Status: DISCONTINUED | OUTPATIENT
Start: 2022-06-10 | End: 2022-06-10 | Stop reason: SDUPTHER

## 2022-06-10 RX ORDER — ONDANSETRON 2 MG/ML
4 INJECTION INTRAMUSCULAR; INTRAVENOUS
Status: COMPLETED | OUTPATIENT
Start: 2022-06-10 | End: 2022-06-10

## 2022-06-10 RX ADMIN — FENTANYL CITRATE 50 MCG: 50 INJECTION INTRAMUSCULAR; INTRAVENOUS at 07:39

## 2022-06-10 RX ADMIN — ONDANSETRON 4 MG: 2 INJECTION INTRAMUSCULAR; INTRAVENOUS at 07:44

## 2022-06-10 RX ADMIN — FENTANYL CITRATE 50 MCG: 50 INJECTION INTRAMUSCULAR; INTRAVENOUS at 07:25

## 2022-06-10 RX ADMIN — PROPOFOL 200 MG: 10 INJECTION, EMULSION INTRAVENOUS at 07:25

## 2022-06-10 RX ADMIN — Medication 2000 MG: at 07:14

## 2022-06-10 RX ADMIN — OXYCODONE 5 MG: 5 TABLET ORAL at 08:34

## 2022-06-10 RX ADMIN — ONDANSETRON 4 MG: 2 INJECTION INTRAMUSCULAR; INTRAVENOUS at 08:34

## 2022-06-10 RX ADMIN — DEXAMETHASONE SODIUM PHOSPHATE 4 MG: 4 INJECTION, SOLUTION INTRAMUSCULAR; INTRAVENOUS at 07:41

## 2022-06-10 RX ADMIN — SODIUM CHLORIDE, SODIUM LACTATE, POTASSIUM CHLORIDE, AND CALCIUM CHLORIDE: 600; 310; 30; 20 INJECTION, SOLUTION INTRAVENOUS at 06:28

## 2022-06-10 RX ADMIN — LIDOCAINE HYDROCHLORIDE 100 MG: 20 INJECTION, SOLUTION EPIDURAL; INFILTRATION; INTRACAUDAL; PERINEURAL at 07:25

## 2022-06-10 RX ADMIN — MIDAZOLAM HYDROCHLORIDE 2 MG: 1 INJECTION, SOLUTION INTRAMUSCULAR; INTRAVENOUS at 06:28

## 2022-06-10 ASSESSMENT — PAIN SCALES - GENERAL
PAINLEVEL_OUTOF10: 4
PAINLEVEL_OUTOF10: 0
PAINLEVEL_OUTOF10: 0

## 2022-06-10 ASSESSMENT — PAIN DESCRIPTION - LOCATION: LOCATION: KNEE

## 2022-06-10 ASSESSMENT — PAIN DESCRIPTION - ORIENTATION: ORIENTATION: LEFT

## 2022-06-10 NOTE — ANESTHESIA PROCEDURE NOTES
Airway  Date/Time: 6/10/2022 7:26 AM  Urgency: elective    Airway not difficult    General Information and Staff    Patient location during procedure: OR  Resident/CRNA: SUSAN Rodriguez - CRNA  Performed: resident/CRNA     Indications and Patient Condition  Indications for airway management: anesthesia  Spontaneous Ventilation: absent  Sedation level: deep  Preoxygenated: yes  Patient position: sniffing  MILS maintained throughout  Mask difficulty assessment: vent by bag mask    Final Airway Details  Final airway type: supraglottic airway      Successful airway: Size 4    Number of attempts at approach: 1  Number of other approaches attempted: 0

## 2022-06-10 NOTE — INTERVAL H&P NOTE
Update History & Physical    The Patient's History and Physical was reviewed   I discussed the surgery and patients medical condition with the patient. The chart was reviewed with the patient and I examined the patient. There was no change from previous note. The surgical site was confirmed by the patient and me. CV: RRR  RESP: CTAB    Plan:  The risk, benefits, expected outcome, and alternative to the recommended procedure have been discussed with the patient. Patient understands and elects to proceed with the procedure as planned.     Electronically signed by Simona Gunter MD on 06/10/22 7:09 AM

## 2022-06-10 NOTE — PERIOP NOTE
Discharge instructions discussed with ex  Janina Arora at bedside, no questions or concerns at this time. Hard copy of instructions given to Janina Arora, prescriptions escribed.

## 2022-06-10 NOTE — OP NOTE
Operative Report    Patient: Ginna Tapia MRN: [id number]  SSN: [id number]    YOB: 1970  Age: 49 y.o. Sex: female       Date of Surgery:6/10/22    Preoperative Diagnosis: Left knee medial Meniscus tear    Postoperative Diagnosis: Same    Surgeon(s) and Role:     * Regina Obrien MD - Primary    Anesthesia: General     Procedure:    1) left knee arthroscopy with partial medial meniscectomy/debridement      Estimated Blood Loss:  5 mL    Tourniquet Time: * No tourniquets in log *        Implants:   None           Specimens: * No specimens in log *        Drains: None                Complications: None    Counts: Sponge and needle counts were correct times two. Indications: See H&P      Findings:  Extensive complex degenerative medial meniscus tear  Moderate degenerative changes medial compartment    Procedure in Detail: After informed consent was obtained the surgical site was marked in the preoperative area by myself the surgeon. Patient was brought to the operating room placed supine the operating table general anesthesia was induced. The operative extremity was prepped and draped in usual orthopedic sterile fashion timeout was performed per protocol. Antibiotics were given per protocol. Initially a standard inferolateral arthroscopy portal was established. Diagnostic arthroscopy was carried out. Suprapatellar pouch was benign. Patellofemoral compartment demonstrated some central grade II and III chondromalacia of the patella of the articular surfaces of the undersurface of the patella and  mild changes of the trochlea. Medial and lateral gutters were free of loose bodies. Anteromedial portal was established under spinal needle guidance. Synovitis in anterior interval was debrided with a motorized shaver. ACL was intact and stable to probing with good tension. PCL was intact.    Lateral compartment demonstrated mild degenerative changes the meniscus was intact and stable to probing      The medial compartment demonstrated diffuse grade III chondromalacia with small focal areas of full-thickness and some grade III chondromalacia of the tibial plateau. There was a complex degenerative tear of the mid body and posterior horn the medial meniscus with some displaced tearing in the white white zone. The root had partial involvement. Combination of arthroscopic biter and shaver were used to resect the hypermobile portions. There was minimal meniscal remnant left in the mid body unfortunately due to the friable poor quality of the meniscal tissue that had to be removed. Tourniquet was let down the knee was drained the portals were closed with buried Monocryl suture and Steri-Strips. Local anesthetic with 0.5% Naropin was injected into incision sites and a small amount intra-articularly for post op pain control. Sterile dressings were applied.   Allison tolerated the procedure well was awakened and transferred to the PACU in stable condition        Postoperative plan:  1) Discharge Home  2) DVT prophylaxis with aspirin 81 mg twice daily x 2 weeks  3) Rehab protocol: Routine knee arthroscopy protocol with range of motion as tolerated and gradual progression of weightbearing as tolerated     Signed By:  Tiana Grewal MD     Yessica 10, 2022

## 2022-06-10 NOTE — ANESTHESIA PRE PROCEDURE
Department of Anesthesiology  Preprocedure Note       Name:  Jessica Sparks   Age:  49 y.o.  :  1970                                          MRN:  [id number]         Date:  6/10/2022      Surgeon: Vito Cobos):  Adalberto Condon MD    Procedure: Procedure(s):  LEFT KNEE ARTHROSCOPY MEDIAL MENISCECTOMY  SUPINE    Medications prior to admission:   Prior to Admission medications    Medication Sig Start Date End Date Taking? Authorizing Provider   HYDROcodone-acetaminophen (NORCO) 5-325 MG per tablet Take 1 tablet by mouth every 6 hours as needed for Pain. Yes Historical Provider, MD   pregabalin (LYRICA) 50 MG capsule Take 50 mg by mouth at bedtime. Yes Historical Provider, MD   Lidocaine (ZTLIDO) 1.8 % PTCH Apply topically every 12 hours   Yes Historical Provider, MD   valsartan (DIOVAN) 80 mg tablet Take 1 tablet by mouth daily 22   Юлия Ly DO   Cholecalciferol (VITAMIN D) 50 MCG (2000) CAPS capsule Take by mouth    Historical Provider, MD   loratadine (CLARITIN) 10 MG capsule Take 10 mg by mouth daily    Historical Provider, MD   amLODIPine (NORVASC) 10 MG tablet Take 10 mg by mouth daily 22   Ar Automatic Reconciliation   mesalamine (LIALDA) 1.2 g EC tablet TAKE 2 TABLETS BY MOUTH EVERY DAY WITH A MEAL 22   Ar Automatic Reconciliation   mirabegron (MYRBETRIQ) 50 MG TB24 Take 50 mg by mouth daily 22   Ar Automatic Reconciliation   pregabalin (LYRICA) 75 MG capsule Take 75 mg by mouth 3 times daily.   21   Ar Automatic Reconciliation   traZODone (DESYREL) 50 MG tablet Take 2 tab at bedtime 22   Ar Automatic Reconciliation       Current medications:    Current Facility-Administered Medications   Medication Dose Route Frequency Provider Last Rate Last Admin    sodium chloride flush 0.9 % injection 5-40 mL  5-40 mL IntraVENous 2 times per day SUSAN [place] - CNP        sodium chloride flush 0.9 % injection 5-40 mL  5-40 mL IntraVENous PRN SUSAN [place] - CNP        ceFAZolin (ANCEF) 2000 mg in sterile water 20 mL IV syringe  2,000 mg IntraVENous On Call to [facility], APRN - CNP        lactated ringers infusion   IntraVENous Continuous Alber Lee MD 75 mL/hr at 06/10/22 0628 New Bag at 06/10/22 0019       Allergies: Allergies   Allergen Reactions    Codeine Other (See Comments)     Makes her \"wired\" hyper    Penicillins Rash    Morphine Other (See Comments)     Grandmother had brain stem stroke with this    Adhesive Tape Rash       Problem List:    Patient Active Problem List   Diagnosis Code    Mild depression (HCC) F32.0    KELSEA (obstructive sleep apnea) G47.33    Psychophysiological insomnia F51.04    Obesity (BMI 30.0-34. 9) E66.9    Nocturnal hypoxemia G47.34    Reactive depression F32.9    Hypertension I10    Acute medial meniscus tear, left, initial encounter S83.242A    Left knee pain M25.562       Past Medical History:        Diagnosis Date    Atypical ductal hyperplasia of right breast     per pt benign    Chronic pain     R knee    Colitis     Depression     HTN (hypertension)     managed with med    Sleep apnea     per pt is in the process of getting a new CPAP    Ulcerative colitis ([facility] [address]        Past Surgical History:        Procedure Laterality Date    BREAST BIOPSY Right     RIGHT BREAST NEEDLE LOCALIZED BIOPSY performed by Martínez Johnson MD at [facility] Right 2019    LAPAROSCOPIC HYSTERECTOMY      VANESSA STEROTACTIC LOC BREAST BIOPSY RIGHT Right 07/16/2018    VANESSA STEROTACTIC LOC BREAST BIOPSY RIGHT 7/16/2018 [facility]    ORTHOPEDIC SURGERY Right 06/2017    Knee       Social History:    Social History     Tobacco Use    Smoking status: Never Smoker    Smokeless tobacco: Never Used   Substance Use Topics    Alcohol use:  No                                Counseling given: Not Answered      Vital Signs (Current):   Vitals: 06/08/22 1154 06/10/22 0604   BP:  (!) 176/81   Pulse:  88   Resp:  18   Temp:  98.2 °F (36.8 °C)   TempSrc:  Oral   SpO2:  96%   Weight: 176 lb (79.8 kg) 176 lb (79.8 kg)   Height: 5' 0.5\" (1.537 m)                                               BP Readings from Last 3 Encounters:   06/10/22 (!) 176/81   06/07/22 110/60   05/16/22 (!) 170/110       NPO Status: Time of last liquid consumption: 2300                        Time of last solid consumption: 2300                        Date of last liquid consumption: 06/09/22                        Date of last solid food consumption: 06/09/22    BMI:   Wt Readings from Last 3 Encounters:   06/10/22 176 lb (79.8 kg)   06/07/22 179 lb (81.2 kg)   05/24/22 176 lb (79.8 kg)     Body mass index is 33.81 kg/m². CBC:   Lab Results   Component Value Date    MCV 97.0 03/18/2022       CMP:   Lab Results   Component Value Date     03/18/2022    K 3.6 03/18/2022    CL 99 03/18/2022    CO2 25 03/18/2022    BUN 10 03/18/2022    CREATININE 0.88 03/18/2022    GFRAA 107 07/15/2021    AGRATIO 2.0 03/18/2022    GLUCOSE 92 03/18/2022    PROT 7.0 05/09/2022    CALCIUM 9.7 03/18/2022    BILITOT 0.3 05/09/2022    ALKPHOS 73 05/09/2022    AST 20 05/09/2022    ALT 27 05/09/2022       POC Tests: No results for input(s): POCGLU, POCNA, POCK, POCCL, POCBUN, POCHEMO, POCHCT in the last 72 hours.     Coags: No results found for: PROTIME, INR, APTT    HCG (If Applicable): No results found for: PREGTESTUR, PREGSERUM, HCG, HCGQUANT     ABGs: No results found for: PHART, PO2ART, ZGJ7JEN, ZGU0JXO, BEART, W8KIDZQU     Type & Screen (If Applicable):  No results found for: LABABO, LABRH    Drug/Infectious Status (If Applicable):  No results found for: HIV, HEPCAB    COVID-19 Screening (If Applicable): No results found for: COVID19        Anesthesia Evaluation  Patient summary reviewed  Airway: Mallampati: II  TM distance: <3 FB   Neck ROM: full  Mouth opening: > = 3 FB   Dental: Pulmonary:normal exam    (+) sleep apnea: on CPAP,                             Cardiovascular:  Exercise tolerance: good (>4 METS),   (+) hypertension: mild,                   Neuro/Psych:   Negative Neuro/Psych ROS              GI/Hepatic/Renal: Neg GI/Hepatic/Renal ROS  (+) GERD: well controlled,           Endo/Other: Negative Endo/Other ROS                    Abdominal:             Vascular: negative vascular ROS. Other Findings:           Anesthesia Plan      general     ASA 3       Induction: intravenous. Anesthetic plan and risks discussed with patient and spouse.                         Oral Boucher MD   6/10/2022

## 2022-06-10 NOTE — ANESTHESIA POSTPROCEDURE EVALUATION
Department of Anesthesiology  Postprocedure Note    Patient: Eleonore Hamman  MRN: [id number]  YOB: 1970  Date of evaluation: 6/10/2022  Time:  9:19 AM     Procedure Summary     Date: 06/10/22 Room / Location: [facility] OP OR 05 / [facility]    Anesthesia Start: 0720 Anesthesia Stop: 0808    Procedure: LEFT KNEE ARTHROSCOPY MEDIAL MENISCAL DEBRIDEMENT (Left Knee) Diagnosis:       Acute medial meniscus tear, left, initial encounter      Left knee pain, unspecified chronicity      (ACUTE MEDIAL MENISCUS TEAR, LEFT, INITIAL ENCOUNTER   LEFT KNEE PAIN, UNSPECIFIED CHRONICITY)    Surgeons: Penelope Basurto MD Responsible Provider: Yamileth Cordova MD    Anesthesia Type: general ASA Status: 3          Anesthesia Type: No value filed. Mojgan Phase I: Mojgan Score: 10    Mojgan Phase II:      Last vitals: Reviewed and per EMR flowsheets.        Anesthesia Post Evaluation    Patient location during evaluation: PACU  Patient participation: complete - patient participated  Level of consciousness: awake and alert  Pain score: 2  Airway patency: patent  Nausea & Vomiting: no nausea and no vomiting  Complications: no  Cardiovascular status: blood pressure returned to baseline  Respiratory status: acceptable  Hydration status: euvolemic  Comments: /65   Pulse 74   Temp 97.7 °F (36.5 °C) (Temporal)   Resp 15   Ht 5' 0.5\" (1.537 m)   Wt 176 lb (79.8 kg)   SpO2 93%   BMI 33.81 kg/m²   Multimodal analgesia pain management approach

## 2022-06-13 ENCOUNTER — OFFICE VISIT (OUTPATIENT)
Dept: ORTHOPEDIC SURGERY | Age: 52
End: 2022-06-13
Payer: COMMERCIAL

## 2022-06-13 ENCOUNTER — TELEPHONE (OUTPATIENT)
Dept: ORTHOPEDIC SURGERY | Age: 52
End: 2022-06-13

## 2022-06-13 ENCOUNTER — OFFICE VISIT (OUTPATIENT)
Dept: ORTHOPEDIC SURGERY | Age: 52
End: 2022-06-13

## 2022-06-13 DIAGNOSIS — M25.562 LEFT KNEE PAIN, UNSPECIFIED CHRONICITY: ICD-10-CM

## 2022-06-13 DIAGNOSIS — R68.89 DECREASED ACTIVITY TOLERANCE: ICD-10-CM

## 2022-06-13 DIAGNOSIS — M25.469 KNEE SWELLING: ICD-10-CM

## 2022-06-13 DIAGNOSIS — Z78.9 DECREASED ACTIVITIES OF DAILY LIVING (ADL): ICD-10-CM

## 2022-06-13 DIAGNOSIS — M25.662 DECREASED RANGE OF MOTION OF LEFT KNEE: Primary | ICD-10-CM

## 2022-06-13 DIAGNOSIS — S83.242D ACUTE MEDIAL MENISCUS TEAR OF LEFT KNEE, SUBSEQUENT ENCOUNTER: Primary | ICD-10-CM

## 2022-06-13 DIAGNOSIS — R26.2 DIFFICULTY WALKING: ICD-10-CM

## 2022-06-13 DIAGNOSIS — R29.898 IMPAIRED STRENGTH OF LOWER EXTREMITY: ICD-10-CM

## 2022-06-13 PROCEDURE — 97110 THERAPEUTIC EXERCISES: CPT | Performed by: PHYSICAL THERAPIST

## 2022-06-13 PROCEDURE — 97162 PT EVAL MOD COMPLEX 30 MIN: CPT | Performed by: PHYSICAL THERAPIST

## 2022-06-13 NOTE — PROGRESS NOTES
Penicillins Rash    Morphine Other (See Comments)     Grandmother had brain stem stroke with this    Adhesive Tape Rash        SUBJECTIVE     Chief complaints/history of injury:  November 2022 onset of L knee pain, insidious in onset w progression of knee pain. Positive for night pain prior to surgery. Pain Assessment   Pain location: : L knee    Current pain (0-10 pain scale): 0/10   Pain at best: (0-10 pain scale): 0/10   Pain at worst: (0-10 pain scale): 0/10   Aggravating factors: WBing   Alleviating factors: Rest, cold    Neuro screen: denies numbness, tingling, and radiating pain    Social/Functional Hx: How would you rate your overall health? fair  Pt lives with independent spouse in a(n) 1 story house with entry steps. Current DME: crutches and Iceman. 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 Current level of function: 80% of 'normal'. Functioning better now than before surgery. Patient's Stated Goals: To walk w/o pain. OBJECTIVE     Observation:   Posture: L LE alignment unremarkable. Swelling/Edema: Moderate about the L knee  Skin Integrity: steri-strips intact   Atrophy: none noted. LEFS FUNCTIONAL QUESTIONNAIRE 6/13/22   RAW SCORE 39/80   % FUNCTION 49   % DYSFUNCTION/LIMITATION 51     PAIN RATING (0 - 10) 6/13/22   PRE 0   POST      KNEE AROM (?) 6/13/22   LEFT   (INVOLVED) 6 - 120   RIGHT   (NON - INVOLVED)     0 - 137     Hip & ankle AROM WFL bilaterally. RIGHT   (NON - INVOLVED) LQ MMT (0 - 5) LEFT   (INVOLVED)   5 HIP FLEXION 5   5 KNEE EXTENSION 3   5 KNEE FLEXION 5     Function:  Gait: independent w crutches, antalgic, shortened strides.   Stair management: deferred  Sit to stand: independent w weight shifted over the R foot  Balance: good      Today's treatment consisted of an initial evaluation f/b:   Patient Education: Re the findings of the PT evaluation and the PT POC; the principles of symptom limited activity and exercise and the use of elevation and cold for pain and swelling control. Instruction in/practice of home exercise program requiring verbal and tactile cues by PT. Patient provided w exercise illustrations through [address] [place] [address]. [id number] - THERAPEUTIC EXERCISE: 25 MIN. Addressing pain and deficits related to ROM and mm function - force production, endurance, neuromuscular coordination for completing ADLs. Access Code: [email or web address]  URL: [email or web address]  Date: 06/13/2022  Prepared by: Lynne Pardo    Exercises  Supine Heel Slide with Small Ball - 4 x daily - 6 x weekly - 1 sets  Supine Knee Extension Strengthening - 2 x daily - 6 x weekly - 1 sets - 5 reps - 10 sec hold  Supine Quad Set - 4 x daily - 6 x weekly - 1 sets - 5 reps - 10 hold (sec)  Active Straight Leg Raise with Quad Set - 2 x daily - 6 x weekly - 10 reps - 1 sets      CLINICAL DECISION MAKING/ASSESSMENT     Personal Factors/co-morbidities affecting POC (1-2 Medium/3+High):   body measurements   Problem List: (1-2 Low/ 3 Medium/ 4+ High):   Compromised skin integrity  Localized swelling/edema  ROM limitations  Strength deficits  Motor control deficits  Impaired transfers  Impaired gait  Decreased endurance/activity Tolerance  Self care difficulty  ADL/functional limitations/modifications  Restricted social activity  Restricted recreational participation  Decreased Knowledge of Precautions  Decreased [place] with Home Exercise Program    Clinical decision making:   moderate complexity with questionable prediction of expectations and future outcomes which may require adjustments to the POC. Prognosis:   good   Benefits and precautions of treatment explained to patient.   John Contreras is a 49 y.o. female who presents to therapy today with evolving/changing clinical presentation (moderate complexity)  related to L knee arthroscopy. Pt would benefit from skilled physical therapy services to address the deficits noted above for return to prior level of function. PLAN OF CARE     Effective Dates: 6/13/2022 TO 8/12/2022 (60 days). Frequency/Duration: 2x/week for 90 Day(s)  Interventions may include but are not limited to:   (15902) Therapeutic exercise to develop ROM, strength, endurance and flexibility  (37499) Therapeutic activities using dynamic activities to improve function  (02986) Gait training to address mechanics, proper step length and weight shifting to improve household and community mobility as well as overall safety with ADLs  (69668) Manual therapy techniques to improve joint and/or soft tissue mobility, ROM, and function as well as helping to decrease pain/spasms and swelling  Home exercise program (HEP) development  Modalities prn to address pain, spasms, and swelling: (39407) Vasopneumatic compression  (55905) Hot/cold pack    GOALS     SHORT - TERM GOALS TO BE ATTAINED BY 6/24/22:  Patient will voice understanding of symptom limited activity principle. Patient will report compliance w home program of exercise, cold and elevation. Patient will demonstrate independent skill w initial HEP. Extension deficit of involved knee will improve to ? 3? Cherl Spinner Flexion of involved knee will improve to ?130? Cherl Spinner LONG - TERM GOALS TO BE ATTAINED BY 8/5/22:  Patient will return to normal sleep pattern re quality and duration. Extension deficit of involved knee resolved. Flexion of involved knee will improve to ?130? Cherl Spinner Patient will attain full knee extension in order to be able to ambulate w/ appropriate knee extension during WBing phase of gait cycle. Patient will attain knee flexion ? 130? in order to be able to perform activities involving squatting or kneeling.   Patient will attain LQ function - ROM, mm strength - endurance, coordination of movement - that allows patient to:  - negotiate stairs w a reciprocal gait pattern. - negotiate uneven ground safely and w/o need of assistive device. - resume desired activities/attain patient goals of: walking w/o pain    [employer]  Access Code: [email or web address]  URL: [email or web address]  Date: 06/13/2022  Prepared by: Zahra Maxwell    Exercises  Supine Heel Slide with Small Ball - 4 x daily - 6 x weekly - 1 sets  Supine Knee Extension Strengthening - 2 x daily - 6 x weekly - 1 sets - 5 reps - 10 sec hold  Supine Quad Set - 4 x daily - 6 x weekly - 1 sets - 5 reps - 10 hold (sec)  Active Straight Leg Raise with Quad Set - 2 x daily - 6 x weekly - 10 reps - 1 sets No

## 2022-06-13 NOTE — PROGRESS NOTES
Patient was prescribed a [address] and against medical advice the patient declined to receive/purchase the DME. Patient understands they may take their prescription elsewhere if desired.

## 2022-06-13 NOTE — TELEPHONE ENCOUNTER
She had surgery last week and thought she had an iceman at home but it not working. She would like to get one.  She is coming for a separate appointment today at [facility] at  and could pick it up then if possible

## 2022-06-13 NOTE — TELEPHONE ENCOUNTER
I've made an appointment for her with DME at 1:45p. She can get the [facility] ([facility]) after her physical therapy session.

## 2022-06-13 NOTE — LETTER
DME Patient Authorization Form    Name: Josefina Atkins  : 1970  MRN: [id number]   Age: 49 y.o. Gender: female  Delivery Address: [facility]     Diagnosis:    1. Acute medial meniscus tear of left knee, subsequent encounter  S83.242D     2. Left knee pain, unspecified chronicity  M25.562     3. Sprain of medial collateral ligament of left knee, subsequent encounter  S83.412D          Requested DME:  [employer]  ($200.00) x 1 - left        Clinical Notes:     **Indicates non-covered items by insurance. Payment expected on date of service. Electronically signed by  Provider ________________________     Date: ______________________                             [facility] Tax ID # [id number]        Durable Medical Equipment and/or Orthotics Patient Consent     I understand that my physician has prescribed this medical supply as part of my treatment plan as a matter of Medical Necessity.  I understand that I have a choice in where I receive my prescribed orthopedic supplies and/or services.  I authorize [facility] to furnish this service/product and to provide my insurance carrier with any information requested in order to process for payment.  I instruct my insurance carrier to pay [facility] directly for these services/products.  I understand that my insurance carrier may deny payment for this supply because it is a non-covered item, deemed not medically necessary or considered experimental.   I understand that any cost not covered by my insurance carrier will be solely my financial responsibility.  I have received the Supplier Standards and have reviewed them.    I have received the prescribed item and have been fully instructed on the proper use of the above services/products.    ______ (Patient Initials) I understand that all DME items are non-returnable after being dispensed. Items still in sealed packaging may be returned up to 14 days after purchasing. [address] will replace items that are defective.    ______ (Patient Initials) I understand that Hank Lerma will not file a claim with my insurance carrier for this service/product and I am waiving my right to file a claim on my own for this service/product with my insurance company as this item is NON-COVERED (Denoted by the **) by my Insurance company/policy. ______ (Patient Initials) I understand that I am responsible to bring my equipment to the hospital for any surgery. ______________________________________________  ________________________  Patient / Sierra Other            Thank you for considering [address] Your physician has prescribed specific medical equipment or devices for your home use. The following describes your rights and responsibilities as our customer. Right to Choose Providers: You have a choice regarding which company supplies your home medical equipment and devices, and to consult your physician in this decision. You may choose a medical supply store, a home medical equipment provider, or a specialist such as [facility]. [facility] will coordinate with your physician to provide the medical equipment or devices prescribed for your home use. Right to Service:  You have the right to considerate, respectful and nondiscriminatory care. You have the right to receive accurate and easily understood information about your health care. If you speak a foreign language, or don't understand the discussions, assistance will be provided to allow you to make informed health care decisions. You have the right to know your treatment options and to participate in decisions about your care, including the right to accept or refuse treatment. You have the right to expect a reasonable response to your requests for treatment or service. You have the right to talk in confidence with health care providers and to have your health care information protected. You have the right to receive an explanation of your bill. You have the right to complain about the service or product you receive. Patient Responsibilities:  Please provide complete and accurate information about your health insurance benefits and make arrangements for the timely payment of your bill. [facility] will, if possible, assume responsibility for billing your insurance (Medicare, Medicaid and commercial) for the prescribed equipment or devices. If your policy does not cover the prescribed product, or only covers a portion of the bill, you are responsible for any remaining balance. Return and Exchange Policy:  [facility] will honor published  Warranties for products. [facility] will accept returns or exchanges within 14 days from the date of receipt, providin) the product must be in new condition; 2) receipt as required; and 3) used disposable and hygiene products may only be returned due to a defective product. Note: Refunds will be issued in a timely manner, please allow 4-6 weeks for processing. Complaint Procedures and DME Consumer Protection Resources:  [facility] values you as a customer, and is committed to resolving patient concerns. This commitment includes understanding and documenting your concerns, conducting a review of internal procedures, and providing you with an explanation and resolution to your concerns. Should you have any questions about our services or billing process, please contact our office at (practice phone number). If we are unable to resolve the concern, you have the right to direct comments to the office of Consumer Protection, in the [address] or the [facility] office, without fear of repercussion.     DMEPOS SUPPLIER STANDARDS    A supplier must be in compliance with all applicable Federal and [facility] and regulatory requirements. A supplier must provide complete and accurate information on the DMEPOS supplier application. Any changes to this information must be reported to the [facility] [employer] within 30 days. An authorized individual (one whose signature is binding) must sign the application for billing privileges. A supplier must fill orders from its own inventory, or must contract with other companies for the purchase of items necessary to fill the order. A supplier may not contract with any entity that is currently excluded from the Medicare program, any [facility] program, or from any other Federal procurement or Nonprocurement programs. A supplier must advise beneficiaries that they may rent or purchase inexpensive or routinely purchased durable medical equipment, and of the purchase option for capped rental equipment. A supplier must notify beneficiaries of warranty coverage and honor all warranties under applicable State Law, and repair or replace free of charge Medicare covered items that are under warranty. A supplier must maintain a physical facility on an appropriate site. A supplier must permit CMS, or its agents to conduct on-site inspections to ascertain the supplier's compliance with these standards. The supplier location must be accessible to beneficiaries during reasonable business hours, and must maintain a visible sign and posted hours of operation. A supplier must maintain a primary business telephone listed under the name of the business in a [employer] or a toll free number available through directory assistance. The exclusive use of a beeper, answering machine or cell phone is prohibited.   A supplier must have comprehensive liability insurance in the amount of at least $300,000 that covers both the supplier's place of business and all customers and employees of the supplier. If the supplier manufactures its own items, this insurance must also cover product liability and completed operations. A supplier must agree not to initiate telephone contact with beneficiaries, with a few exceptions allowed. This standard prohibits suppliers from calling beneficiaries in order to solicit new business. A supplier is responsible for delivery and must instruct beneficiaries on use of Medicare covered items, and maintain proof of delivery. A supplier must answer questions, and respond to complaints of the beneficiaries, and maintain documentation of such contacts. A supplier must maintain and replace at no charge or repair directly, or through a service contract with another company, Medicare covered items it has rented to beneficiaries. A supplier must accept returns of substandard (less than full quality for the particular item) or unsuitable items (inappropriate for the beneficiary at the time it was fitted and rented or sold) from beneficiaries. A supplier must disclose these supplier standards to each beneficiary to whom it supplies a Medicare-covered item. A supplier must disclose to the government any person having ownership, financial, or control interest in the supplier. A supplier must not convey or reassign a supplier number; i.e., the supplier may not sell or allow another entity to use its Medicare billing number. A supplier must have a complaint resolution protocol established to address beneficiary complaints that relate to these standards. A record of these complaints must be maintained at the physical facility. Complaint records must include: the name, address, telephone number and health insurance claim number of the beneficiary, a summary of the complaint, and any action taken to resolve it. A supplier must agree to furnish CMS any information required by the Medicare statute and implementing regulations.   A supplier of DMEPOS and other items and services must be accredited by a CMS-approved accreditation organization in order to receive and retain a supplier billing number. The accreditation must indicate the specific products and services, for which the supplier is accredited in order for the supplier to receive payment for those specific products and services. A DMEPOS supplier must notify their accreditation organization when a new DMEPOS location is opened. The accreditation organization may accredit the new supplier location for three months after it is operational without requiring a new site visit. All DMEPOS supplier locations, whether owned or subcontracted, must meet the [employer] and be separately accredited in order to bill Medicare. An accredited supplier may be denied enrollment or their enrollment may be revoked, if CMS determines that they are not in compliance with the DMEPOS quality standards. A DMEPOS supplier must disclose upon enrollment all products and services, including the addition of new product lines for which they are seeking accreditation. If a new product line is added after enrollment, the DMEPOS supplier will be responsible for notifying the accrediting body of the new product so that the DMEPOS supplier can be re-surveyed and accredited for these new products. Must meet the surety bond requirements specified in 42 C. F.R. 424.57(c). Implementation date- May 4, 2009. A supplier must obtain oxygen from a state-licensed oxygen supplier. A supplier must maintain ordering and referring documentation consistent with provisions found in 42 C. F.R. 424.516(f). DMEPOS suppliers are prohibited from sharing a practice location with certain other Medicare providers and suppliers. DMEPOS suppliers must remain open to the public for a minimum of 30 hours per week with certain exceptions.

## 2022-06-17 ENCOUNTER — OFFICE VISIT (OUTPATIENT)
Dept: ORTHOPEDIC SURGERY | Age: 52
End: 2022-06-17
Payer: COMMERCIAL

## 2022-06-17 DIAGNOSIS — R68.89 DECREASED ACTIVITY TOLERANCE: ICD-10-CM

## 2022-06-17 DIAGNOSIS — Z78.9 DECREASED ACTIVITIES OF DAILY LIVING (ADL): ICD-10-CM

## 2022-06-17 DIAGNOSIS — R29.898 IMPAIRED STRENGTH OF LOWER EXTREMITY: ICD-10-CM

## 2022-06-17 DIAGNOSIS — M25.469 KNEE SWELLING: Primary | ICD-10-CM

## 2022-06-17 DIAGNOSIS — M25.662 DECREASED RANGE OF MOTION OF LEFT KNEE: ICD-10-CM

## 2022-06-17 DIAGNOSIS — R26.2 DIFFICULTY WALKING: ICD-10-CM

## 2022-06-17 DIAGNOSIS — M25.562 LEFT KNEE PAIN, UNSPECIFIED CHRONICITY: ICD-10-CM

## 2022-06-17 PROCEDURE — 97110 THERAPEUTIC EXERCISES: CPT | Performed by: PHYSICAL THERAPIST

## 2022-06-17 NOTE — PROGRESS NOTES
[facility]  [address] [facility] [address]  Dept: [phone]      PHYSICAL THERAPY DAILY NOTE     POC ENDS:  8/16/2022      PT SESSION 2 of 99/CAL YEAR    TOTAL TIMED PROCEDURE CODES: 39 MIN. TOTAL TIME: 39 MIN.    1 WEEK POST OP    REFERRING MD: Jenise Garcia MD  DIAGNOSIS:    Diagnosis Orders   1. Knee swelling     2. Decreased activity tolerance     3. Decreased range of motion of left knee     4. Impaired strength of lower extremity     5. Decreased activities of daily living (ADL)     6. Left knee pain, unspecified chronicity     7. Difficulty walking       SURGERY: Left knee arthroscopy with partial medial meniscectomy/debridement DATE: Fri 6/10/22    THERAPY PRECAUTIONS:Adhesive/tape allergy; RTKA 2019  CO - MORBIDITIES AFFECTING PLAN OF CARE: Adhesive sensitivity.     PERTINENT MEDICAL HISTORY     PAST MEDICAL AND SURGICAL HISTORY:  Past Medical History:   Diagnosis Date    Atypical ductal hyperplasia of right breast     per pt benign    Chronic pain     R knee    Colitis     Depression     HTN (hypertension)     managed with med    Sleep apnea     per pt is in the process of getting a new CPAP    Ulcerative colitis ([facility] [address]       Past Surgical History:   Procedure Laterality Date    BREAST BIOPSY Right     RIGHT BREAST NEEDLE LOCALIZED BIOPSY performed by Amy Medrano MD at [address] [place] [address] Right 2019    KNEE ARTHROSCOPY Left 6/10/2022    LEFT KNEE ARTHROSCOPY MEDIAL MENISCAL DEBRIDEMENT performed by Lilia Gonzalez MD at [facility] STEROTACTIC LOC BREAST BIOPSY RIGHT Right 07/16/2018    [facility] STEROTACTIC LOC BREAST BIOPSY RIGHT 7/16/2018 E RADIOLOGY MAMMO    ORTHOPEDIC SURGERY Right 06/2017    Knee     MEDICATIONS: reviewed in chart   ALLERGIES:   Allergies   Allergen Reactions    Codeine Other (See Comments)     Makes her \"wired\" hyper    Penicillins Rash    Morphine Other (See Comments)     Grandmother had brain stem stroke with this    Adhesive Tape Rash      Social/Functional Hx: How would you rate your overall health? fair  Pt lives with independent spouse in a(n) 1 story house with entry steps. Current DME: crutches and [employer]. Work Status: Employed full time: [employer]      Patient's Stated Goals: To walk w/o pain. SUBJECTIVE     Pt reports:  Patient reports:  Doing well overall. > 50% of normal.  Not having pain when walking. OBJECTIVE     FINDINGS:  Observation:   Posture: L LE alignment unremarkable. Swelling/Edema: Moderate about the L knee  Skin Integrity: steri-strips intact   Atrophy: none noted. LEFS FUNCTIONAL QUESTIONNAIRE 6/13/22   RAW SCORE 39/80   % FUNCTION 49   % DYSFUNCTION/LIMITATION 51     PAIN RATING (0 - 10) 6/13/22   PRE 0   POST      LEFT KNEE AROM (?) 6/13/22 6/17/22   PRE 6 - 120 3 - 145   POST         Function:  Gait: independent w 1 crutch, mildly antalgic, shortened strides. Stair management: deferred. Sit to stand: independent w weight shifted over the R foot  Balance: good      Today's treatment consisted:  PATIENT EDUCATION: Reviewed initial POC including modification of activities and the use of heat and/or cold. Reviewed initial HEP w patient demonstrating each exercise and PT correcting technique as needed w redemonstration, verbal or tactile cueing. PT answering any patient questions. [id number] - THERAPEUTIC EXERCISE: 40 MIN. Addressing pain and deficits related to ROM and mm function - force production, endurance, neuromuscular coordination for completing ADLs. Access Code: [email or web address]  URL: [email or web address]  Date: 06/17/2022  Prepared by: Lester Art    Exercises  Supine Heel Slide with Small Ball - 4 x daily - 6 x weekly - 1 sets  Supine Knee Extension Strengthening - 2 x daily - 6 x weekly - 1 sets - 5 reps - 10 sec hold  Supine Quad Set - 4 x daily - 6 x weekly - 1 sets - 5 reps - 10 hold (sec)  Active Straight Leg Raise with Quad Set - 2 x daily - 6 x weekly - 10 reps - 1 sets  Sidelying Hip Abduction - 2 x daily - 6 x weekly - 1 sets - 10 reps      ASSESSMENT     Excellent progress to date w patient reporting:  - no pain  - knee ROM improved to 3 - 145° from 6 - 120° @ IE  - ambulation ability improved w patient down to 1 crutch and minimal antalgia  - no signs of drainage and portals still in place  - patient compliant w post op instructions and HEP w good skill demonstrated    PLAN     Progress rehab exercise program as tolerated. Use vaso PRN. GOALS     SHORT - TERM GOALS TO BE ATTAINED BY 6/24/22:  Patient will voice understanding of symptom limited activity principle. Patient will report compliance w home program of exercise, cold and elevation. Patient will demonstrate independent skill w initial HEP. Extension deficit of involved knee will improve to ? 3? Gen Cesar Flexion of involved knee will improve to ?130? Gen Cesar LONG - TERM GOALS TO BE ATTAINED BY 8/5/22:  Patient will return to normal sleep pattern re quality and duration. Extension deficit of involved knee resolved. Flexion of involved knee will improve to ?130? Gen Cesar Patient will attain full knee extension in order to be able to ambulate w/ appropriate knee extension during WBing phase of gait cycle. Patient will attain knee flexion ? 130? in order to be able to perform activities involving squatting or kneeling. Patient will attain LQ function - ROM, mm strength - endurance, coordination of movement - that allows patient to:  - negotiate stairs w a reciprocal gait pattern. - negotiate uneven ground safely and w/o need of assistive device. - resume desired activities/attain patient goals of: walking w/o pain    [employer]  Access Code: [email or web address]  URL: [email or web address]  Date: 06/13/2022  Prepared by: Agustin Moritz    Exercises  Supine Heel Slide with Small Ball - 4 x daily - 6 x weekly - 1 sets  Supine Knee Extension Strengthening - 2 x daily - 6 x weekly - 1 sets - 5 reps - 10 sec hold  Supine Quad Set - 4 x daily - 6 x weekly - 1 sets - 5 reps - 10 hold (sec)  Active Straight Leg Raise with Quad Set - 2 x daily - 6 x weekly - 10 reps - 1 sets

## 2022-06-20 NOTE — PROGRESS NOTES
[address] [place] [address] Court, [address] [facility]  [phone]    Patient Name:  Juan Pablo Orantes  YOB: 1970      Office Visit 6/21/2022    CHIEF COMPLAINT:    Chief Complaint   Patient presents with    Follow-up    Sleep Apnea    Insomnia         HISTORY OF PRESENT ILLNESS:        She had a diagnostic sleep study last year revealed overall mild sleep apnea that was moderate during REM. The AHI was 11.1/hour. Severe nocturnal hypoxemia was noted with the lowest oxygen saturation being 80% and less than 89% for 62.7 minutes of the test.  She an in lab CPAP study, tolerated  CPAP well at 9 cm. After that she was a started on auto CPAP at 4-12 cm with EPR 3. He had multiple difficulty with finding the proper mask for her self and she tried full facemask and nasal mask without any definite success. Because she was frustrated she return her machine. She underwent right knee surgery recently by Dr. Nicola Perez at [facility] orthopedic. She is in the rehabilitation phase at this point. Her symptoms of sleep apnea seem to be getting worse again and she is interested in reestablishing the CPAP treatment. We discussed other option including oral appliance and she is not interested in doing that at this point. We will go ahead and proceed with ordering her CPAP machine at 9 cm and do another mask fitting to see if would be more successful this time. She is taking trazodone for insomnia and seem to be doing okay with that.           Turned in machine [facility] 1/31/22          Past Medical History:   Diagnosis Date    Atypical ductal hyperplasia of right breast     per pt benign    Chronic pain     R knee    Colitis     Depression     HTN (hypertension)     managed with med    Sleep apnea     per pt is in the process of getting a new CPAP    Ulcerative colitis ([facility] [address]          Patient Active Problem List   Diagnosis    Mild depression ([facility] [address] [facility]    KELSEA (obstructive sleep apnea)  Psychophysiological insomnia    Obesity (BMI 30.0-34. 9)    Nocturnal hypoxemia    Reactive depression    Hypertension    Acute medial meniscus tear, left, initial encounter    Left knee pain          Past Surgical History:   Procedure Laterality Date    BREAST BIOPSY Right     RIGHT BREAST NEEDLE LOCALIZED BIOPSY performed by Ria Mason MD at [facility]      KNEE ARTHROPLASTY Right 2019    KNEE ARTHROSCOPY Left 6/10/2022    LEFT KNEE ARTHROSCOPY MEDIAL MENISCAL DEBRIDEMENT performed [facility] Diana Lee [facility] STEROTACTIC LOC BREAST BIOPSY RIGHT Right 07/16/2018    [facility] STEROTACTIC LOC BREAST BIOPSY RIGHT 7/16/2018 [facility]Y [facility]    ORTHOPEDIC SURGERY Right 06/2017    Knee       [unfilled]        Social History     Socioeconomic History    Marital status:      Spouse name: Not on file    Number of children: Not on file    Years of education: Not on file    Highest education level: Not on file   Occupational History    Not on file   Tobacco Use    Smoking status: Never Smoker    Smokeless tobacco: Never Used   Vaping Use    Vaping Use: Never used   Substance and Sexual Activity    Alcohol use: No    Drug use: No    Sexual activity: Not on file   Other Topics Concern    Not on file   Social History Narrative    Not on file     Social Determinants of Health     Financial Resource Strain:     Difficulty of Paying Living Expenses: Not on file   Food Insecurity:     Worried About Running Out of Food in the Last Year: Not on file    Kaushal of Food in the Last Year: Not on file   Transportation Needs:     Lack of Transportation (Medical): Not on file    Lack of Transportation (Non-Medical):  Not on file   Physical Activity:     Days of Exercise per Week: Not on file    Minutes of Exercise per Session: Not on file   Stress:     Feeling of Stress : Not on file   Social Connections: MG tablet Take 2 tab at bedtime     No current facility-administered medications for this visit. REVIEW OF SYSTEMS:   CONSTITUTIONAL:   There is no history of fever, chills, night sweats, weight loss, weight gain, persistent fatigue, or lethargy/hypersomnolence. CARDIAC:   No chest pain, pressure, discomfort, palpitations, orthopnea, murmurs, or edema. GI:   No dysphagia, heartburn reflux, nausea/vomiting, diarrhea, abdominal pain, or bleeding. NEURO:   There is no history of AMS, persistent headache, decreased level of consciousness, seizures, or motor or sensory deficits. PHYSICAL EXAM:    Vitals:    06/21/22 0824   BP: 138/86   Pulse: 94   Resp: 14   Temp: 98.2 °F (36.8 °C)   SpO2: 99%        GENERAL APPEARANCE:   The patient is normal weight and in no respiratory distress. HEENT:   PERRL. Conjunctivae unremarkable. Nasal mucosa is without epistaxis, exudate, or polyps. Gums and dentition are unremarkable. There is moderate oropharyngeal narrowing. She is Mallampati 3   NECK/LYMPHATIC:   Symmetrical with no elevation of jugular venous pulsation. Trachea midline. No thyroid enlargement. No cervical adenopathy. LUNGS:   Normal respiratory effort with symmetrical lung expansion. Breath sounds are diminished in the bases. HEART:   There is a regular rate and rhythm. No murmur, rub, or gallop. There is no edema in the lower extremities. ABDOMEN:   Soft and non-tender. No hepatosplenomegaly. Bowel sounds are normal.     NEURO:   The patient is alert and oriented to person, place, and time. Memory appears intact and mood is normal.  No gross sensorimotor deficits are present. ASSESSMENT:  (Medical Decision Making)      Diagnosis Orders   1. KELSEA (obstructive sleep apnea), was mild and will be treated with CPAP again. This will be started at 9 cm with the proper mask fitting and CPAP teaching as noted below     DME - [address]   2.  Nocturnal hypoxemia, severe and improved the CPAP treatment   DME - DURABLE MEDICAL EQUIPMENT   3. Psychophysiological insomnia, on trazodone             PLAN:    Restart CPAP at 9 cm with EPR 3 and humidity    Sleep hygiene and positional therapy are recommended    Appropriate handout regarding sleep hygiene is given to the patient    She will have another mask fitting done and CPAP teaching as well by the DME provider    She will return to the sleep center in 4 months or sooner if needed      Orders Placed This Encounter   Procedures    DME - [address]     [facility]  Phone: [address] [address]  Dept: [phone]      Patient Name: John Contreras  : 1970  Gender: female  Address: [address] [place], [place] [address]  [id number] [place] [address]  [phone]      Primary Insurance: Payor: Kolton Sherman / Plan: Steven Rice / Product Type: *No Product type* /   Subscriber ID: [id number] - (Tommie ALATORRE)      AMB Supply Order  Order Details     DME Location:   Order Date: 2022   The primary encounter diagnosis was KELSEA (obstructive sleep apnea). Diagnoses of Nocturnal hypoxemia and Psychophysiological insomnia were also pertinent to this visit.           (  X   )New Set-Up       machine   (   x  ) CPAP Unit  (     ) Auto CPAP Unit  (     ) BiLevel Unit  (     ) Auto BiLevel Unit  (     ) ASV   (     ) Bilevel ST    (     ) Oxygen Concentrator         Length of need: 12 months    Pressure: 9  cmH20  EPR: 3     Starting Ramp Pressure:  5 cm H20  Ramp Time: 15 min       Patient had a diagnostic Apnea Hypopnea Index (AHI) of :  11/hr    *SUPPLIES* Replace all as needed, or per coverage guidelines     Masks Type:    (    ) -Full Face Mask (1 per 3 mon)  (     ) -Full Mask (1 per month) Interface/Cushion      (     ) -Nasal Mask (1 per 3 mon)  (     ) - Nasal Mask (1 per month) Interface/Cushion  (     ) -Pillow (2 per mon)  ( ) -Ruywrpfux (1 per 6 mon)      _________________________________________________________________          Other Supplies:    (  X   )-Brjxfdxb (1 per 6 mon)  ( X    )-Vkdvpv Tubing (1 per 3 mon)  (  X   )- Disposable Filter (2 per mon)  (   X  )-Ledtnq Humidifier (1 per year)     (  x   )-Bbyykrhyc (sometimes used with Full Face Mask) (1 per 6 mos)  (     )-Tubing-without heat (1 per 3 mos)  ( X   )-Non-Disposable Filter (1 per 6 mos)  (   x  )-Water Chamber (1 per 6 mos)  (     )-Humidifier non-heated (1 per 5 yrs)      Signed Date: 6/21/2022  Electronically Signed By: Mauricio Medeors MD      No orders of the defined types were placed in this encounter. Over 50% of today's office visit was spent in face to face time reviewing test results, prognosis, importance of compliance, education about disease process, benefits of medications, instructions for management of acute flare-ups, and follow up plans. Total face to face time spent with patient and charting was 30 minutes.         Mauricio Mederos MD  Electronically signed

## 2022-06-21 ENCOUNTER — OFFICE VISIT (OUTPATIENT)
Dept: SLEEP MEDICINE | Age: 52
End: 2022-06-21
Payer: COMMERCIAL

## 2022-06-21 ENCOUNTER — OFFICE VISIT (OUTPATIENT)
Dept: ORTHOPEDIC SURGERY | Age: 52
End: 2022-06-21
Payer: COMMERCIAL

## 2022-06-21 VITALS
HEART RATE: 94 BPM | OXYGEN SATURATION: 99 % | RESPIRATION RATE: 14 BRPM | TEMPERATURE: 98.2 F | HEIGHT: 61 IN | DIASTOLIC BLOOD PRESSURE: 86 MMHG | BODY MASS INDEX: 33.79 KG/M2 | WEIGHT: 179 LBS | SYSTOLIC BLOOD PRESSURE: 138 MMHG

## 2022-06-21 DIAGNOSIS — R68.89 DECREASED ACTIVITY TOLERANCE: ICD-10-CM

## 2022-06-21 DIAGNOSIS — M25.469 KNEE SWELLING: Primary | ICD-10-CM

## 2022-06-21 DIAGNOSIS — M25.562 LEFT KNEE PAIN, UNSPECIFIED CHRONICITY: ICD-10-CM

## 2022-06-21 DIAGNOSIS — G47.33 OSA (OBSTRUCTIVE SLEEP APNEA): Primary | ICD-10-CM

## 2022-06-21 DIAGNOSIS — F51.04 PSYCHOPHYSIOLOGICAL INSOMNIA: ICD-10-CM

## 2022-06-21 DIAGNOSIS — Z78.9 DECREASED ACTIVITIES OF DAILY LIVING (ADL): ICD-10-CM

## 2022-06-21 DIAGNOSIS — R26.2 DIFFICULTY WALKING: ICD-10-CM

## 2022-06-21 DIAGNOSIS — M25.662 DECREASED RANGE OF MOTION OF LEFT KNEE: ICD-10-CM

## 2022-06-21 DIAGNOSIS — R29.898 IMPAIRED STRENGTH OF LOWER EXTREMITY: ICD-10-CM

## 2022-06-21 DIAGNOSIS — G47.34 NOCTURNAL HYPOXEMIA: ICD-10-CM

## 2022-06-21 PROCEDURE — 99214 OFFICE O/P EST MOD 30 MIN: CPT | Performed by: INTERNAL MEDICINE

## 2022-06-21 PROCEDURE — 97010 HOT OR COLD PACKS THERAPY: CPT | Performed by: PHYSICAL THERAPIST

## 2022-06-21 PROCEDURE — 97110 THERAPEUTIC EXERCISES: CPT | Performed by: PHYSICAL THERAPIST

## 2022-06-21 ASSESSMENT — SLEEP AND FATIGUE QUESTIONNAIRES
HOW LIKELY ARE YOU TO NOD OFF OR FALL ASLEEP WHILE WATCHING TV: 2
HOW LIKELY ARE YOU TO NOD OFF OR FALL ASLEEP WHILE LYING DOWN TO REST IN THE AFTERNOON WHEN CIRCUMSTANCES PERMIT: 3
HOW LIKELY ARE YOU TO NOD OFF OR FALL ASLEEP IN A CAR, WHILE STOPPED FOR A FEW MINUTES IN TRAFFIC: 0
HOW LIKELY ARE YOU TO NOD OFF OR FALL ASLEEP WHILE SITTING AND TALKING TO SOMEONE: 0
ESS TOTAL SCORE: 6
HOW LIKELY ARE YOU TO NOD OFF OR FALL ASLEEP WHEN YOU ARE A PASSENGER IN A CAR FOR AN HOUR WITHOUT A BREAK: 0
HOW LIKELY ARE YOU TO NOD OFF OR FALL ASLEEP WHILE SITTING INACTIVE IN A PUBLIC PLACE: 0
HOW LIKELY ARE YOU TO NOD OFF OR FALL ASLEEP WHILE SITTING AND READING: 1
HOW LIKELY ARE YOU TO NOD OFF OR FALL ASLEEP WHILE SITTING QUIETLY AFTER LUNCH WITHOUT ALCOHOL: 0

## 2022-06-21 NOTE — PROGRESS NOTES
[address] [facility]  [address] [place] [address],[facility] [address] [address]  Dept: [phone]      PHYSICAL THERAPY DAILY NOTE     POC ENDS:  8/16/2022      PT SESSION 3 of 99/CAL YEAR    TOTAL TIMED PROCEDURE CODES: 40 MIN. TOTAL TIME: 50 MIN. DATE of SURGERY: Fri 6/10/22    1+ WEEK POST OP    REFERRING MD: Dewayne Laboy MD  DIAGNOSIS:    Diagnosis Orders   1. Knee swelling     2. Impaired strength of lower extremity     3. Difficulty walking     4. Decreased activity tolerance     5. Decreased activities of daily living (ADL)     6. Decreased range of motion of left knee     7. Left knee pain, unspecified chronicity       SURGERY: Left knee arthroscopy with partial medial meniscectomy/debridement DATE: Fri 6/10/22    THERAPY PRECAUTIONS:Adhesive/tape allergy; RTKA 2019  CO - MORBIDITIES AFFECTING PLAN OF CARE: Adhesive sensitivity.     PERTINENT MEDICAL HISTORY     PAST MEDICAL AND SURGICAL HISTORY:  Past Medical History:   Diagnosis Date    Atypical ductal hyperplasia of right breast     per pt benign    Chronic pain     R knee    Colitis     Depression     HTN (hypertension)     managed with med    Sleep apnea     per pt is in the process of getting a new CPAP    Ulcerative colitis ([facility] [address]       Past Surgical History:   Procedure Laterality Date    BREAST BIOPSY Right     RIGHT BREAST NEEDLE LOCALIZED BIOPSY performed by Anaid Sellers MD at [address] [place] [address] Right 2019    KNEE ARTHROSCOPY Left 6/10/2022    LEFT KNEE ARTHROSCOPY MEDIAL MENISCAL DEBRIDEMENT performed by Chavez Alva MD at [facility] STEROTACTIC LOC BREAST BIOPSY RIGHT Right 07/16/2018    [facility] STEROTACTIC LOC BREAST BIOPSY RIGHT 7/16/2018 [facility] RADIOLOGY MAMMO    ORTHOPEDIC SURGERY Right 06/2017    Knee     MEDICATIONS: reviewed in chart   ALLERGIES:   Allergies   Allergen Reactions    Codeine Other (See Comments) [email or web address]  URL: [email or web address]  Date: 06/17/2022  Prepared by: Sofia Warren    [id number] - ICE MASSAGE: 10 MIN. To L pes anserinus for the relief of pain, tenderness, swelling. ASSESSMENT     Patient notes new complaint of medial knee and leg pain over the past 2 days which is less intense today but still present. Complaints appear consistent w irritation in the pes anserine area. Exercises were progressed w/o exacerbation. Trial of ice massage over area of concern; will see how patient responds. PLAN     Progress rehab exercise program as tolerated. Use cold or vaso PRN. GOALS     SHORT - TERM GOALS TO BE ATTAINED BY 6/24/22:  Patient will voice understanding of symptom limited activity principle. Patient will report compliance w home program of exercise, cold and elevation. Patient will demonstrate independent skill w initial HEP. Extension deficit of involved knee will improve to ? 3? Ginger Renteria Flexion of involved knee will improve to ?130? Ginger Renteria LONG - TERM GOALS TO BE ATTAINED BY 8/5/22:  Patient will return to normal sleep pattern re quality and duration. Extension deficit of involved knee resolved. Flexion of involved knee will improve to ?130? Ginger Renteria Patient will attain full knee extension in order to be able to ambulate w/ appropriate knee extension during WBing phase of gait cycle. Patient will attain knee flexion ? 130? in order to be able to perform activities involving squatting or kneeling. Patient will attain LQ function - ROM, mm strength - endurance, coordination of movement - that allows patient to:  - negotiate stairs w a reciprocal gait pattern. - negotiate uneven ground safely and w/o need of assistive device. - resume desired activities/attain patient goals of: walking w/o pain    [employer]  Access Code: [email or web address]  URL: [email or web address]  Date: 06/13/2022  Prepared by: Sofia Warren    Exercises  Supine Heel Slide with Small Ball - 4 x daily - 6 x weekly - 1 sets  Supine Knee Extension Strengthening - 2 x daily - 6 x weekly - 1 sets - 5 reps - 10 sec hold  Supine Quad Set - 4 x daily - 6 x weekly - 1 sets - 5 reps - 10 hold (sec)  Active Straight Leg Raise with Quad Set - 2 x daily - 6 x weekly - 10 reps - 1 sets

## 2022-06-21 NOTE — PATIENT INSTRUCTIONS
Sleep Hygiene Instructions     Sleep only as much as you need to feel refreshed during the following day. Restricting your time in bed helps to consolidate and deepen your sleep. Excessively long times in bed lead to fragmented and shallow sleep. Get up at your regular time the next day, no matter how little your slept.  Get up at the same time each day, 7 days a week. A regular wake time in the morning leads to regular times on sleep onset, and helps to set your biological clock.  Exercise regularly. Schedule exercise times so that they do not occur within 3 hours of when you intend to go to bed. Exercise makes it easier to initiate sleep and deepen sleep.  Don't take your problems to bed. Plan some time earlier in the evening for working on your problems or planning the next day's activities. Worrying may interfere with initiating sleep and produce shallow sleep.  Train yourself to use the bedroom only for sleep and sexual activity. This will help condition your brain to see bed as the place for sleeping. Do not read, watch TV or eat in bed.  Do not try and fall asleep. This only makes the problem worse. Instead, turn on the light, leave the bedroom, and do something different like reading a book. Don't engage in stimulating activity. Return to bed only when you feel sleepy.  Avoid long naps. Staying awake during the day helps to fall asleep at night. Naps totalling more than 30 minutes increase your chances of having trouble sleeping at night.  Make sure that your bedroom is comfortable and free from light and noise. A comfortable, noise-free sleep environment will reduce the likelihood that you will wake up during the night. Noise that does not awaken you may disturb the quality of your sleep. Carpeting, insulated curtains, and closing the door may help.  Make sure that your bedroom is at a comfortable temperature during the night.  Excessively warm or cold sleep environments may disturb sleep.  Eat regular meals and di not go to bed hungry. Hunger may disturb sleep. A light snack at bedtime (especially carbohydrates) may help sleep, but avoid greasy or heavy foods.  Avoid excessive liquids in the evening. Reducing liquid intake will minimize the need for night-time trips to the bathroom.  Cut down on all caffeine products. Caffeinated beverages and food (Coffee, tea, cola, chocolate) can cause difficulty falling asleep, awakenings during the night, and shallow sleep. Even caffeine early in the day can disrupt night-time sleep.  Avoid alcohol, especially in the evening. Although alcohol helps tense people fall asleep more easily, it causes awakenings later in the night.   Smoking may disturb sleep. Nicotine is a stimulant. Try not to smoke during the night when you have trouble sleeping. Learning about Sleeping Well    What does sleeping well mean? Sleeping well means getting enough sleep. How much sleep is enough varies among people. The number of hours you sleep is not as improtant as how you feel when you wake up. If you do not feel refreshed, you probably need more sleep. Another sign of not getting enough sleep is feeling tired during the day. The average totally nightly sleep time is 7 1/2 to 8 hours. Healthy adults may need a little more or a little less than this. Why is getting enough sleep important? Getting enough quality sleep is a basic part of good health. When your sleep suffers, your mood and your thoughts can suffer too. Your thoughts can suffer too. You ma find yourself feeling more grumpy or stressed. No getting enough sleep also can lead to serious problems, including injury, accidents, anxiety, and depression. What might cause poor sleeping? Many things can cause sleep problems, including:    Stress. Stress can be caused by fear about a single event, such as giving a speech.   Or you may have ongoing stress, such as worry about work or school. Depression, anxiety, and other mental or emotional conditions. Changes in your sleep habits or surroundings. This includes changes that happen where you sleep, such as noise, light, or sleeping in a different bed. It also includes changes in your sleep pattern, such as having jet lab or working a late shift. Health problems, such as pain, breathing problems, and restless legs syndrome. Lack of regular exercise. How can you help yourself? Here are some tips that may help you sleep more soundly and wake up feeling more refreshed. Your sleeping area    Use your bedroom only for sleeping and sex. A bit of light reading may help you fall asleep. But if it doesn't, do your reading elsewhere in the house. Don't watch TV in bed. Be sure your bed is big enough to stretch out comfortable, especially if you have a sleep partner. Keep your bedroom quiet, dark, and cool. Use curtains, blinds, or sleep mask to block out light. To block out noise, use earplugs, soothing music, or a \"white noise\" machine. Your evening and bedtime routine    Create a relaxing bedtime routine. You might want to take a warm shower or bath, listen to soothing music, or drink a cup of non-caffeinated tea. Go to bed at the same time every night. And get up at the same time every morning, even if you feel tired. What to avoid:    Limit caffeine (coffee, tea, caffeinated sodas) during the day, and dont have any for at least 4 to 6 hours before bedtime. Don't drink alcohol before bedtime. Alcohol can cause you to wake up more often during the night. Don't smoke or use tobacco, especially in the evening. Nicotine can keep you awake. Don't like in bed away for too long. If you can't fall asleep, or if you wake up in the middle of the night and can't get back to sleep within 15 minutes or so, get out of bed and go to another room until you feel sleepy.     Don't take medicine

## 2022-06-23 ENCOUNTER — OFFICE VISIT (OUTPATIENT)
Dept: ORTHOPEDIC SURGERY | Age: 52
End: 2022-06-23
Payer: COMMERCIAL

## 2022-06-23 DIAGNOSIS — Z09 S/P ORTHOPEDIC SURGERY, FOLLOW-UP EXAM: Primary | ICD-10-CM

## 2022-06-23 DIAGNOSIS — S83.242D ACUTE MEDIAL MENISCUS TEAR OF LEFT KNEE, SUBSEQUENT ENCOUNTER: ICD-10-CM

## 2022-06-23 DIAGNOSIS — S83.412D SPRAIN OF MEDIAL COLLATERAL LIGAMENT OF LEFT KNEE, SUBSEQUENT ENCOUNTER: ICD-10-CM

## 2022-06-23 PROCEDURE — 99024 POSTOP FOLLOW-UP VISIT: CPT | Performed by: ORTHOPAEDIC SURGERY

## 2022-06-23 PROCEDURE — L1820 KO ELAS W/ CONDYLE PADS & JO: HCPCS | Performed by: ORTHOPAEDIC SURGERY

## 2022-06-23 NOTE — LETTER
DME Patient Authorization Form    Name: Betina Sutherland  : 1970  MRN: [id number]   Age: 49 y.o. Gender: female  Delivery Address: [facility]     Diagnosis:     ICD-10-CM    1. S/P orthopedic surgery, follow-up exam  Z09    2. Acute medial meniscus tear of left knee, subsequent encounter  S83.242D    3. Sprain of medial collateral ligament of left knee, subsequent encounter  S83.412D         Requested DME:  Reddie Hinged  Brace ()        Clinical Notes:     **Indicates non-covered items by insurance. Payment expected on date of service. Electronically signed by  Provider: ______Thong Turner MD_______________        Date: 2022                             [facility] Tax ID # [id number]        Durable Medical Equipment and/or Orthotics Patient Consent     I understand that my physician has prescribed this medical supply as part of my treatment plan as a matter of Medical Necessity.  I understand that I have a choice in where I receive my prescribed orthopedic supplies and/or services.  I authorize [facility] to furnish this service/product and to provide my insurance carrier with any information requested in order to process for payment.  I instruct my insurance carrier to pay [facility] directly for these services/products.  I understand that my insurance carrier may deny payment for this supply because it is a non-covered item, deemed not medically necessary or considered experimental.   I understand that any cost not covered by my insurance carrier will be solely my financial responsibility.  I have received the Supplier Standards and have reviewed them.    I have received the prescribed item and have been fully instructed on the proper use of the above services/products.    ______ (Patient Initials) I understand that all DME items are non-returnable after being dispensed. Items still in sealed packaging may be returned up to 14 days after purchasing. [address] will replace items that are defective.    ______ (Patient Initials) I understand that Hank Lerma will not file a claim with my insurance carrier for this service/product and I am waiving my right to file a claim on my own for this service/product with my insurance company as this item is NON-COVERED (Denoted by the **) by my Insurance company/policy. ______ (Patient Initials) I understand that I am responsible to bring my equipment to the hospital for any surgery. ______________________________________________  ________________________  Patient / Ang Nixon            Thank you for considering [address] Your physician has prescribed specific medical equipment or devices for your home use. The following describes your rights and responsibilities as our customer. Right to Choose Providers: You have a choice regarding which company supplies your home medical equipment and devices, and to consult your physician in this decision. You may choose a medical supply store, a home medical equipment provider, or a specialist such as [facility]. [facility] will coordinate with your physician to provide the medical equipment or devices prescribed for your home use. Right to Service:  You have the right to considerate, respectful and nondiscriminatory care. You have the right to receive accurate and easily understood information about your health care. If you speak a foreign language, or don't understand the discussions, assistance will be provided to allow you to make informed health care decisions.   You have the right to know your treatment options and to participate in decisions about your care, including the right to accept or refuse treatment. You have the right to expect a reasonable response to your requests for treatment or service. You have the right to talk in confidence with health care providers and to have your health care information protected. You have the right to receive an explanation of your bill. You have the right to complain about the service or product you receive. Patient Responsibilities:  Please provide complete and accurate information about your health insurance benefits and make arrangements for the timely payment of your bill. [facility] will, if possible, assume responsibility for billing your insurance (Medicare, Medicaid and commercial) for the prescribed equipment or devices. If your policy does not cover the prescribed product, or only covers a portion of the bill, you are responsible for any remaining balance. Return and Exchange Policy:  [facility] will honor published  Warranties for products. [facility] will accept returns or exchanges within 14 days from the date of receipt, providin) the product must be in new condition; 2) receipt as required; and 3) used disposable and hygiene products may only be returned due to a defective product. Note: Refunds will be issued in a timely manner, please allow 4-6 weeks for processing. Complaint Procedures and DME Consumer Protection Resources:  [facility] values you as a customer, and is committed to resolving patient concerns. This commitment includes understanding and documenting your concerns, conducting a review of internal procedures, and providing you with an explanation and resolution to your concerns. Should you have any questions about our services or billing process, please contact our office at (practice phone number).   If we are unable to resolve the concern, you have the right to direct comments to the office of Consumer Protection, in the [address] or the [employer]  office, without fear of repercussion. DMEPOS SUPPLIER STANDARDS    A supplier must be in compliance with all applicable Federal and [facility] and regulatory requirements. A supplier must provide complete and accurate information on the DMEPOS supplier application. Any changes to this information must be reported to the [facility] within 30 days. An authorized individual (one whose signature is binding) must sign the application for billing privileges. A supplier must fill orders from its own inventory, or must contract with other companies for the purchase of items necessary to fill the order. A supplier may not contract with any entity that is currently excluded from the Medicare program, any [facility] program, or from any other Federal procurement or Nonprocurement programs. A supplier must advise beneficiaries that they may rent or purchase inexpensive or routinely purchased durable medical equipment, and of the purchase option for capped rental equipment. A supplier must notify beneficiaries of warranty coverage and honor all warranties under applicable State Law, and repair or replace free of charge Medicare covered items that are under warranty. A supplier must maintain a physical facility on an appropriate site. A supplier must permit CMS, or its agents to conduct on-site inspections to ascertain the supplier's compliance with these standards. The supplier location must be accessible to beneficiaries during reasonable business hours, and must maintain a visible sign and posted hours of operation. A supplier must maintain a primary business telephone listed under the name of the business in a [employer] or a toll free number available through directory assistance. The exclusive use of a beeper, answering machine or cell phone is prohibited.   A supplier must have comprehensive liability insurance in the amount of at least $300,000 that covers both the supplier's place of business and all customers and employees of the supplier. If the supplier manufactures its own items, this insurance must also cover product liability and completed operations. A supplier must agree not to initiate telephone contact with beneficiaries, with a few exceptions allowed. This standard prohibits suppliers from calling beneficiaries in order to solicit new business. A supplier is responsible for delivery and must instruct beneficiaries on use of Medicare covered items, and maintain proof of delivery. A supplier must answer questions, and respond to complaints of the beneficiaries, and maintain documentation of such contacts. A supplier must maintain and replace at no charge or repair directly, or through a service contract with another company, Medicare covered items it has rented to beneficiaries. A supplier must accept returns of substandard (less than full quality for the particular item) or unsuitable items (inappropriate for the beneficiary at the time it was fitted and rented or sold) from beneficiaries. A supplier must disclose these supplier standards to each beneficiary to whom it supplies a Medicare-covered item. A supplier must disclose to the government any person having ownership, financial, or control interest in the supplier. A supplier must not convey or reassign a supplier number; i.e., the supplier may not sell or allow another entity to use its Medicare billing number. A supplier must have a complaint resolution protocol established to address beneficiary complaints that relate to these standards. A record of these complaints must be maintained at the physical facility. Complaint records must include: the name, address, telephone number and health insurance claim number of the beneficiary, a summary of the complaint, and any action taken to resolve it. A supplier must agree to furnish CMS any information required by the Medicare statute and implementing regulations.   A supplier of DMEPOS and other items and services must be accredited by a CMS-approved accreditation organization in order to receive and retain a supplier billing number. The accreditation must indicate the specific products and services, for which the supplier is accredited in order for the supplier to receive payment for those specific products and services. A DMEPOS supplier must notify their accreditation organization when a new DMEPOS location is opened. The accreditation organization may accredit the new supplier location for three months after it is operational without requiring a new site visit. All DMEPOS supplier locations, whether owned or subcontracted, must meet the [employer] and be separately accredited in order to bill Medicare. An accredited supplier may be denied enrollment or their enrollment may be revoked, if CMS determines that they are not in compliance with the DMEPOS quality standards. A DMEPOS supplier must disclose upon enrollment all products and services, including the addition of new product lines for which they are seeking accreditation. If a new product line is added after enrollment, the DMEPOS supplier will be responsible for notifying the accrediting body of the new product so that the DMEPOS supplier can be re-surveyed and accredited for these new products. Must meet the surety bond requirements specified in 42 C. F.R. 424.57(c). Implementation date- May 4, 2009. A supplier must obtain oxygen from a state-licensed oxygen supplier. A supplier must maintain ordering and referring documentation consistent with provisions found in 42 C. F.R. 424.516(f). DMEPOS suppliers are prohibited from sharing a practice location with certain other Medicare providers and suppliers. DMEPOS suppliers must remain open to the public for a minimum of 30 hours per week with certain exceptions.

## 2022-06-23 NOTE — PROGRESS NOTES
Name: Jessi Prater  YOB: 1970  Gender: female  MRN: [id number]    Procedure Performed: 1) left knee arthroscopy with partial medial meniscectomy/debridement      Date of Procedure: 6/10/22    Subjective:Returns 2 weeks status post the above procedure. She is doing well but still having some soreness medially over the MCL sprain site. Has not had any other neurological dysfunction or pain out of the ordinary. Physical Examination:Incisions clean dry and intact, no sign of infection. Motor and sensory intact. Some mild tenderness palpation over the course of the MCL. Range of motion is excellent with full extension and flexion back to about 130 degrees. She is able to activate her quad. Assessment:   1. S/P orthopedic surgery, follow-up exam    2. Acute medial meniscus tear of left knee, subsequent encounter    3. Sprain of medial collateral ligament of left knee, subsequent encounter         Plan:   Doing well. I do think she is getting a little bit of discomfort from the MCL. We applied a ready hinged brace today that she can use when she is ambulating. .   Continue PT per knee arthroscopy protocol as well as accounting for the MCL sprain.   Follow up in 4 weeks

## 2022-06-23 NOTE — PROGRESS NOTES
Reddie Hinge brace for patients left knee. I explained how the hinge on each side of the brace should line up with the patella. The patient was also instructed to tighten the strap distal to the patella first to insure the brace is anchored and prevents slipping, , then the top strap should be tightened. Patient read and signed documenting they understand and agree to [facility]'s current DME return policy.

## 2022-06-24 ENCOUNTER — OFFICE VISIT (OUTPATIENT)
Dept: ORTHOPEDIC SURGERY | Age: 52
End: 2022-06-24
Payer: COMMERCIAL

## 2022-06-24 DIAGNOSIS — M25.469 KNEE SWELLING: ICD-10-CM

## 2022-06-24 DIAGNOSIS — R29.898 IMPAIRED STRENGTH OF LOWER EXTREMITY: ICD-10-CM

## 2022-06-24 DIAGNOSIS — Z09 S/P ORTHOPEDIC SURGERY, FOLLOW-UP EXAM: Primary | ICD-10-CM

## 2022-06-24 DIAGNOSIS — Z78.9 DECREASED ACTIVITIES OF DAILY LIVING (ADL): ICD-10-CM

## 2022-06-24 DIAGNOSIS — M25.662 DECREASED RANGE OF MOTION OF LEFT KNEE: ICD-10-CM

## 2022-06-24 DIAGNOSIS — M25.562 LEFT KNEE PAIN, UNSPECIFIED CHRONICITY: ICD-10-CM

## 2022-06-24 DIAGNOSIS — R26.2 DIFFICULTY WALKING: ICD-10-CM

## 2022-06-24 DIAGNOSIS — R68.89 DECREASED ACTIVITY TOLERANCE: ICD-10-CM

## 2022-06-24 PROCEDURE — 97110 THERAPEUTIC EXERCISES: CPT | Performed by: PHYSICAL THERAPIST

## 2022-06-24 NOTE — PROGRESS NOTES
[facility]  [address] [facility],[address] [address]  Dept: [phone]      PHYSICAL THERAPY DAILY NOTE     POC ENDS:  8/16/2022      PT SESSION 4 of 99/CAL YEAR    TOTAL TIMED PROCEDURE CODES: 40 MIN. TOTAL TIME: 40 MIN. DATE of SURGERY: Fri 6/10/22    2 WEEK POST OP    REFERRING MD: Nilam Hernandez MD  DIAGNOSIS:    Diagnosis Orders   1. S/P orthopedic surgery, follow-up exam     2. Knee swelling     3. Decreased activity tolerance     4. Left knee pain, unspecified chronicity     5. Impaired strength of lower extremity     6. Decreased activities of daily living (ADL)     7. Difficulty walking     8. Decreased range of motion of left knee       SURGERY: Left knee arthroscopy with partial medial meniscectomy/debridement DATE: Fri 6/10/22    THERAPY PRECAUTIONS:Adhesive/tape allergy; RTKA 2019  CO - MORBIDITIES AFFECTING PLAN OF CARE: Adhesive sensitivity.     PERTINENT MEDICAL HISTORY     PAST MEDICAL AND SURGICAL HISTORY:  Past Medical History:   Diagnosis Date    Atypical ductal hyperplasia of right breast     per pt benign    Chronic pain     R knee    Colitis     Depression     HTN (hypertension)     managed with med    Sleep apnea     per pt is in the process of getting a new CPAP    Ulcerative colitis ([facility] [address]       Past Surgical History:   Procedure Laterality Date    BREAST BIOPSY Right     RIGHT BREAST NEEDLE LOCALIZED BIOPSY performed by Veronica Combs MD at [address] [place] [address] Right 2019    KNEE ARTHROSCOPY Left 6/10/2022    LEFT KNEE ARTHROSCOPY MEDIAL MENISCAL DEBRIDEMENT performed by Sunni Cantu MD at [facility] STEROTACTIC LOC BREAST BIOPSY RIGHT Right 07/16/2018    [facility] STEROTACTIC LOC BREAST BIOPSY RIGHT 7/16/2018 [facility]    ORTHOPEDIC SURGERY Right 06/2017    Knee     MEDICATIONS: reviewed in chart   ALLERGIES:   Allergies   Allergen Reactions    Codeine Other (See Comments)     Makes her \"wired\" hyper    Penicillins Rash    Morphine Other (See Comments)     Grandmother had brain stem stroke with this    Adhesive Tape Rash      Social/Functional Hx: How would you rate your overall health? fair  Pt lives with independent spouse in a(n) 1 story house with entry steps. Current DME: crutches and Iceman. Work Status: Employed full time: [employer]      Patient's Stated Goals: To walk w/o pain. SUBJECTIVE     Pt reports:  Had a good follow up w Dr. Zachariah Arora who remove the steri strips. Cannot get in the pool for 2 more weeks. He prescribed a hinged brace for the MCL sprain. OBJECTIVE     FINDINGS:  Observation:   Posture: L LE alignment unremarkable. Swelling/Edema: Moderate about the L knee  Skin Integrity: Portals closed. Palpation: NT.   Atrophy: none noted. LEFS FUNCTIONAL QUESTIONNAIRE 6/13/22   RAW SCORE 39/80   % FUNCTION 49   % DYSFUNCTION/LIMITATION 51     PAIN RATING (0 - 10) 6/13/22    PRE 0    POST       LEFT KNEE AROM (?) 6/13/22 6/17/22 6/21/22   PRE 6 - 120 3 - 145 2 - 143   POST          Function:  Gait: independent w 1 crutch, mildly antalgic, shortened strides. Stair management: deferred. Sit to stand: independent w weight shifted over the R foot  Balance: good      Today's treatment consisted:  PATIENT EDUCATION: Encouraged patient to add ice massage to painful area of the medial knee and leg. [id number] - THERAPEUTIC EXERCISE: 40 MIN. Addressing pain and deficits related to ROM and mm function - force production, endurance, neuromuscular coordination for completing ADLs. Exercises:  Recumbent knee (seat position/intensity/duration): 4/2/4. L hip adduction: 0#/3/10. L quad sets: 10 sec hold/5  L SLRs: 1#/12. 2#/12. 3#/12. 4#/12. L hip abduction: 2#/12.3#/12. 4#/12. 5#/12. L knee extension: Lt Bl R: Loop/25. Gr R: Loop/25. L knee flexion: Lt Bl R: Loop/25. Gr R: Loop/25.     Access Code: [email or web address]  URL: [email or web address]  Date: 06/17/2022  Prepared by: Ted Clifford    ASSESSMENT     Patient notes no new complaints> Dr. Rodolfo Vang pleased w progress to date; prescribed a brace for the MCL sprain. Progressed exercise intensity and added hip adduction exercise w/o exacerbation. Patient has cryoball and will perform ice massage at home. PT GOALS ATTAINED:  6/24/22:  Patient will voice understanding of symptom limited activity principle. Patient will report compliance w home program of exercise, cold and elevation. Patient will demonstrate independent skill w initial HEP. Extension deficit of involved knee will improve to ? 3? Zaheer Flores Flexion of involved knee will improve to ?130? Zaheer Flores PLAN     Progress rehab exercise program as tolerated. Use cold or vaso PRN. GOALS     SHORT - TERM GOALS TO BE ATTAINED BY 8/15/22:  Extension deficit of involved knee resolved. Flexion of involved knee will improve to ?130? Zaheer Flores LONG - TERM GOALS TO BE ATTAINED BY 8/5/22:  Patient will return to normal sleep pattern re quality and duration. Patient will attain full knee extension in order to be able to ambulate w/ appropriate knee extension during WBing phase of gait cycle. Patient will attain knee flexion ? 130? in order to be able to perform activities involving squatting or kneeling. Patient will attain LQ function - ROM, mm strength - endurance, coordination of movement - that allows patient to:  - negotiate stairs w a reciprocal gait pattern. - negotiate uneven ground safely and w/o need of assistive device. - resume desired activities/attain patient goals of: walking w/o pain    [employer]  Access Code: [email or web address]  URL: [email or web address]  Date: 06/13/2022  Prepared by: Ted Clifford    Exercises  Supine Heel Slide with Small Ball - 4 x daily - 6 x weekly - 1 sets  Supine Knee Extension Strengthening - 2 x daily - 6 x weekly - 1 sets - 5 reps - 10 sec hold  Supine Quad Set - 4 x daily - 6 x weekly - 1 sets - 5 reps - 10 hold (sec)  Active Straight Leg Raise with Quad Set - 2 x daily - 6 x weekly - 10 reps - 1 sets

## 2022-06-28 ENCOUNTER — OFFICE VISIT (OUTPATIENT)
Dept: ORTHOPEDIC SURGERY | Age: 52
End: 2022-06-28
Payer: COMMERCIAL

## 2022-06-28 DIAGNOSIS — M25.469 KNEE SWELLING: ICD-10-CM

## 2022-06-28 DIAGNOSIS — R29.898 IMPAIRED STRENGTH OF LOWER EXTREMITY: ICD-10-CM

## 2022-06-28 DIAGNOSIS — Z09 S/P ORTHOPEDIC SURGERY, FOLLOW-UP EXAM: Primary | ICD-10-CM

## 2022-06-28 DIAGNOSIS — M25.562 LEFT KNEE PAIN, UNSPECIFIED CHRONICITY: ICD-10-CM

## 2022-06-28 DIAGNOSIS — R68.89 DECREASED ACTIVITY TOLERANCE: ICD-10-CM

## 2022-06-28 DIAGNOSIS — R26.2 DIFFICULTY WALKING: ICD-10-CM

## 2022-06-28 DIAGNOSIS — M25.662 DECREASED RANGE OF MOTION OF LEFT KNEE: ICD-10-CM

## 2022-06-28 DIAGNOSIS — Z78.9 DECREASED ACTIVITIES OF DAILY LIVING (ADL): ICD-10-CM

## 2022-06-28 PROCEDURE — 97116 GAIT TRAINING THERAPY: CPT

## 2022-06-28 PROCEDURE — 97110 THERAPEUTIC EXERCISES: CPT

## 2022-06-28 PROCEDURE — 97140 MANUAL THERAPY 1/> REGIONS: CPT

## 2022-06-28 NOTE — PROGRESS NOTES
111 [facility]  [address] [facility],[address] [address]  Dept: [phone]      PHYSICAL THERAPY DAILY NOTE     POC ENDS:  8/16/2022      PT SESSION 5 of 99/CAL YEAR    TOTAL TIMED PROCEDURE CODES: 54 MIN. TOTAL TIME: 55 MIN. DATE of SURGERY: Fri 6/10/22    2+ WEEK POST OP    REFERRING MD: Hailey Franklin MD  DIAGNOSIS:    Diagnosis Orders   1. S/P orthopedic surgery, follow-up exam     2. Knee swelling     3. Decreased activity tolerance     4. Left knee pain, unspecified chronicity     5. Impaired strength of lower extremity     6. Decreased activities of daily living (ADL)     7. Difficulty walking     8. Decreased range of motion of left knee       SURGERY: Left knee arthroscopy with partial medial meniscectomy/debridement DATE: Fri 6/10/22    THERAPY PRECAUTIONS:Adhesive/tape allergy; RTKA 2019  CO - MORBIDITIES AFFECTING PLAN OF CARE: Adhesive sensitivity.     PERTINENT MEDICAL HISTORY     PAST MEDICAL AND SURGICAL HISTORY:  Past Medical History:   Diagnosis Date    Atypical ductal hyperplasia of right breast     per pt benign    Chronic pain     R knee    Colitis     Depression     HTN (hypertension)     managed with med    Sleep apnea     per pt is in the process of getting a new CPAP    Ulcerative colitis ([facility] [address]       Past Surgical History:   Procedure Laterality Date    BREAST BIOPSY Right     RIGHT BREAST NEEDLE LOCALIZED BIOPSY performed by Yohan Obrien MD at [address] [place] [address] Right 2019    KNEE ARTHROSCOPY Left 6/10/2022    LEFT KNEE ARTHROSCOPY MEDIAL MENISCAL DEBRIDEMENT performed by Navjot Slater MD at [facility] STEROTACTIC LOC BREAST BIOPSY RIGHT Right 07/16/2018    [facility] STEROTACTIC LOC BREAST BIOPSY RIGHT 7/16/2018 E RADIOLOGY MAMMO    ORTHOPEDIC SURGERY Right 06/2017    Knee     MEDICATIONS: reviewed in chart   ALLERGIES:   Allergies   Allergen Reactions    Codeine Other (See Comments)     Makes her \"wired\" hyper    Penicillins Rash    Morphine Other (See Comments)     Grandmother had brain stem stroke with this    Adhesive Tape Rash      Social/Functional Hx: How would you rate your overall health? fair  Pt lives with independent spouse in a(n) 1 story house with entry steps. Current DME: crutches and Iceman. Work Status: Employed full time: [employer]      Patient's Stated Goals: To walk w/o pain. SUBJECTIVE     Pt reports: mild soreness of L knee at time of treatment today. Patient reports compliance with wearing brace for MCL, if she is standing, walking a lot. Says if she is at home and sitting is without it. She says cannot get in the pool for  1-2 more weeks. OBJECTIVE     FINDINGS:  Observation:   Posture: L LE alignment unremarkable. Swelling/Edema: Moderate about the L knee  Skin Integrity: Portals closed. Palpation: NT.   Atrophy: none noted. LEFS FUNCTIONAL QUESTIONNAIRE 6/13/22   RAW SCORE 39/80   % FUNCTION 49   % DYSFUNCTION/LIMITATION 51     PAIN RATING (0 - 10) 6/13/22 6/28/22   PRE 0 0   POST       LEFT KNEE AROM (?) 6/13/22 6/17/22 6/21/22   PRE 6 - 120 3 - 145 2 - 143   POST          Function:  Gait: independent w 1 crutch, mildly antalgic, shortened strides, somewhat flat-foot L. Stair management: deferred. Sit to stand: independent w weight shifted over the R foot  Balance: good      Today's treatment consisted:  PATIENT EDUCATION: Encouraged patient to add ice massage to painful area of the medial knee and leg. Manual therapy (28533) x 8 min utilizing techniques to improve joint and/or soft tissue mobility, ROM, and function as well as helping to decrease pain/spasms and swelling.    Palpation and assessment of soft tissue, muscles, and landmarks    STM L knee including scar and patella mobs    Gait training (31466) x 12 min to address mechanics, proper step length and weight shifting to improve household and community mobility as well as overall safety with ADLs.  Walking laps within the clinic, with verbal cuing for heel-strike, toe-off mechanics. 84879 - THERAPEUTIC EXERCISE: 35 MIN. Addressing pain and deficits related to ROM and mm function - force production, endurance, neuromuscular coordination for completing ADLs. Exercises:  Recumbent knee (seat position/intensity/duration): 4/3/5. L hip adduction: 1#/2/10. L quad sets: 10 sec hold/5  L SLRs: 1#/12. 2#/12. 3#/12. 4#/12. L hip abduction: 2#/12.3#/12. 4#/12. 5#/12. L knee extension: Lt Bl R: Loop/25. Gr R: Loop/25. L knee flexion: Lt Bl R: Loop/25. Gr R: Loop/25. Sit to stand from chair: 1 set 10 - with verbal cuing for equal WB LE      Access Code: [email or web address]  URL: [email or web address]  Date: 06/17/2022  Prepared by: Tomas Driver    ASSESSMENT     Patient presents with mild to moderate swelling surrounding L knee today. Pt demonstrates tendency to ambulate somewhat flat-footed L, she corrects with cuing. Pt notes L lateral hip \"soreness\" after previous treatment session, stating it is muscle soreness, not hip joint. She is able to complete TherEx activities without complaint    Patient has cryoball and will perform ice massage at home. PT GOALS ATTAINED:  6/24/22:  Patient will voice understanding of symptom limited activity principle. Patient will report compliance w home program of exercise, cold and elevation. Patient will demonstrate independent skill w initial HEP. Extension deficit of involved knee will improve to ? 3? Evy Caesar Flexion of involved knee will improve to ?130? Evy Caesar PLAN     Progress rehab exercise program as tolerated. Use cold or vaso PRN. GOALS     SHORT - TERM GOALS TO BE ATTAINED BY 8/15/22:  Extension deficit of involved knee resolved. Flexion of involved knee will improve to ?130? Evy Caesar     LONG - TERM GOALS TO BE ATTAINED BY 8/5/22:  Patient will return to normal sleep pattern re quality and duration. Patient will attain full knee extension in order to be able to ambulate w/ appropriate knee extension during WBing phase of gait cycle. Patient will attain knee flexion ? 130? in order to be able to perform activities involving squatting or kneeling. Patient will attain LQ function - ROM, mm strength - endurance, coordination of movement - that allows patient to:  - negotiate stairs w a reciprocal gait pattern. - negotiate uneven ground safely and w/o need of assistive device. - resume desired activities/attain patient goals of: walking w/o pain    [employer]  Access Code: [email or web address]  URL: [email or web address]  Date: 06/13/2022  Prepared by: Ebony Mullen    Exercises  Supine Heel Slide with Small Ball - 4 x daily - 6 x weekly - 1 sets  Supine Knee Extension Strengthening - 2 x daily - 6 x weekly - 1 sets - 5 reps - 10 sec hold  Supine Quad Set - 4 x daily - 6 x weekly - 1 sets - 5 reps - 10 hold (sec)  Active Straight Leg Raise with Quad Set - 2 x daily - 6 x weekly - 10 reps - 1 sets

## 2022-06-30 ENCOUNTER — OFFICE VISIT (OUTPATIENT)
Dept: ORTHOPEDIC SURGERY | Age: 52
End: 2022-06-30
Payer: COMMERCIAL

## 2022-06-30 DIAGNOSIS — M25.562 LEFT KNEE PAIN, UNSPECIFIED CHRONICITY: ICD-10-CM

## 2022-06-30 DIAGNOSIS — M25.662 DECREASED RANGE OF MOTION OF LEFT KNEE: ICD-10-CM

## 2022-06-30 DIAGNOSIS — Z09 S/P ORTHOPEDIC SURGERY, FOLLOW-UP EXAM: Primary | ICD-10-CM

## 2022-06-30 DIAGNOSIS — M25.469 KNEE SWELLING: ICD-10-CM

## 2022-06-30 DIAGNOSIS — R26.2 DIFFICULTY WALKING: ICD-10-CM

## 2022-06-30 DIAGNOSIS — R68.89 DECREASED ACTIVITY TOLERANCE: ICD-10-CM

## 2022-06-30 DIAGNOSIS — R29.898 IMPAIRED STRENGTH OF LOWER EXTREMITY: ICD-10-CM

## 2022-06-30 DIAGNOSIS — Z78.9 DECREASED ACTIVITIES OF DAILY LIVING (ADL): ICD-10-CM

## 2022-06-30 PROCEDURE — 97110 THERAPEUTIC EXERCISES: CPT

## 2022-06-30 PROCEDURE — 97140 MANUAL THERAPY 1/> REGIONS: CPT

## 2022-06-30 PROCEDURE — 97016 VASOPNEUMATIC DEVICE THERAPY: CPT

## 2022-06-30 NOTE — PROGRESS NOTES
111 [facility]  [address] [place] [address],[address] [address]  Dept: [phone]      PHYSICAL THERAPY DAILY NOTE     POC ENDS:  8/16/2022      PT SESSION 6 of 99/CAL YEAR    TOTAL TIMED PROCEDURE CODES: 40 MIN. TOTAL TIME: 55 MIN. DATE of SURGERY: Fri 6/10/22    2+ WEEK POST OP    REFERRING MD: Romero Steve MD  DIAGNOSIS:    Diagnosis Orders   1. S/P orthopedic surgery, follow-up exam     2. Knee swelling     3. Decreased activity tolerance     4. Left knee pain, unspecified chronicity     5. Impaired strength of lower extremity     6. Decreased activities of daily living (ADL)     7. Difficulty walking     8. Decreased range of motion of left knee       SURGERY: Left knee arthroscopy with partial medial meniscectomy/debridement DATE: Fri 6/10/22    THERAPY PRECAUTIONS:Adhesive/tape allergy; RTKA 2019  CO - MORBIDITIES AFFECTING PLAN OF CARE: Adhesive sensitivity.     PERTINENT MEDICAL HISTORY     PAST MEDICAL AND SURGICAL HISTORY:  Past Medical History:   Diagnosis Date    Atypical ductal hyperplasia of right breast     per pt benign    Chronic pain     R knee    Colitis     Depression     HTN (hypertension)     managed with med    Sleep apnea     per pt is in the process of getting a new CPAP    Ulcerative colitis ([facility] [address]       Past Surgical History:   Procedure Laterality Date    BREAST BIOPSY Right     RIGHT BREAST NEEDLE LOCALIZED BIOPSY performed by Dudley Gasca MD at [address] [place] [address] Right 2019    KNEE ARTHROSCOPY Left 6/10/2022    LEFT KNEE ARTHROSCOPY MEDIAL MENISCAL DEBRIDEMENT performed by Tiana Grewal MD at [facility] STEROTACTIC LOC BREAST BIOPSY RIGHT Right 07/16/2018    [facility] STEROTACTIC LOC BREAST BIOPSY RIGHT 7/16/2018 E RADIOLOGY MAMMO    ORTHOPEDIC SURGERY Right 06/2017    Knee     MEDICATIONS: reviewed in chart   ALLERGIES:   Allergies   Allergen Reactions    Codeine Other (See Comments)     Makes her \"wired\" hyper    Penicillins Rash    Morphine Other (See Comments)     Grandmother had brain stem stroke with this    Adhesive Tape Rash      Social/Functional Hx: How would you rate your overall health? fair  Pt lives with independent spouse in a(n) 1 story house with entry steps. Current DME: crutches and Iceman. Work Status: Employed full time: [employer]      Patient's Stated Goals: To walk w/o pain. SUBJECTIVE     Pt arrives today noting a painful change to L knee. Says experienced a \"painful pop\" in area of pes anserine yesterday, and has experienced a moderately painful gait since then. Patient reports compliance with wearing brace for MCL, if she is standing, walking a lot. Says if she is at home and sitting is without it. OBJECTIVE     FINDINGS:  Observation:   Posture: L LE alignment unremarkable. Swelling/Edema: Moderate about the L knee  Skin Integrity: Portals closed. Palpation: NT.   Atrophy: none noted. LEFS FUNCTIONAL QUESTIONNAIRE 6/13/22   RAW SCORE 39/80   % FUNCTION 49   % DYSFUNCTION/LIMITATION 51     PAIN RATING (0 - 10) 6/13/22 6/28/22   PRE 0 0   POST       LEFT KNEE AROM (?) 6/13/22 6/17/22 6/21/22   PRE 6 - 120 3 - 145 2 - 143   POST          Function:  Gait: independent w 1 crutch, moderately antalgic, shortened strides, somewhat flat-foot L. Stair management: deferred. Sit to stand: independent w weight shifted over the R foot  Balance: good      Today's treatment consisted:  PATIENT EDUCATION: Encouraged patient to add ice massage to painful area of the medial knee and leg. Manual therapy (40001) x 15 min utilizing techniques to improve joint and/or soft tissue mobility, ROM, and function as well as helping to decrease pain/spasms and swelling.    Palpation and assessment of soft tissue, muscles, and landmarks    STM L knee including scar and patella mobs   Anterior proximal shin in are of complaint          68481 - THERAPEUTIC EXERCISE: 25 MIN. Addressing pain and deficits related to ROM and mm function - force production, endurance, neuromuscular coordination for completing ADLs. Exercises:  Recumbent knee (seat position/intensity/duration): 4/3/6. L hip adduction: 1#/2/10. - supine hooklying ball squeeze  L quad sets: 10 sec hold/5  L SLRs: 1#/12. 2#/12. 3#/12. 4#/12. L hip abduction: 2#/12.3#/12. 4#/12. 5#/12. Access Code: [email or web address]  URL: [email or web address]  Date: 06/17/2022  Prepared by: Jason Vilchis      Vasopneumatic Compression (64468) with cold x 15 minutes: to L knee in order to reduce inflammation and swelling/joint effusion which will help improve ROM and manage pain. Patient supine with LE elevated. ASSESSMENT     Patient presents with mild to moderate swelling surrounding L knee today. Mild to moderate tenderness palpated L proximal shin, area of pes anserine. Pt able to complete NWB exercises without increase of knee/shin pain. Pt expresses concern involving \"popping\" sensation experienced yesterday, as she is also going on vacation next week. Encouraged pt to continue using ice this evening. Suggested she contact MD mid-morning if pain worsens or continues at moderate level. PT GOALS ATTAINED:  6/24/22:  Patient will voice understanding of symptom limited activity principle. Patient will report compliance w home program of exercise, cold and elevation. Patient will demonstrate independent skill w initial HEP. Extension deficit of involved knee will improve to ? 3? Hurricane Glow Flexion of involved knee will improve to ?130? Hurricane Glow PLAN     Progress rehab exercise program as tolerated. Use cold or vaso PRN. GOALS     SHORT - TERM GOALS TO BE ATTAINED BY 8/15/22:  Extension deficit of involved knee resolved. Flexion of involved knee will improve to ?130? Keyon Glow     LONG - TERM GOALS TO BE ATTAINED BY 8/5/22:  Patient will return to normal sleep pattern re quality and duration. Patient will attain full knee extension in order to be able to ambulate w/ appropriate knee extension during WBing phase of gait cycle. Patient will attain knee flexion ? 130? in order to be able to perform activities involving squatting or kneeling. Patient will attain LQ function - ROM, mm strength - endurance, coordination of movement - that allows patient to:  - negotiate stairs w a reciprocal gait pattern. - negotiate uneven ground safely and w/o need of assistive device. - resume desired activities/attain patient goals of: walking w/o pain    [employer]  Access Code: [email or web address]  URL: [email or web address]  Date: 06/13/2022  Prepared by: Gino Zmaora    Exercises  Supine Heel Slide with Small Ball - 4 x daily - 6 x weekly - 1 sets  Supine Knee Extension Strengthening - 2 x daily - 6 x weekly - 1 sets - 5 reps - 10 sec hold  Supine Quad Set - 4 x daily - 6 x weekly - 1 sets - 5 reps - 10 hold (sec)  Active Straight Leg Raise with Quad Set - 2 x daily - 6 x weekly - 10 reps - 1 sets

## 2022-07-01 ENCOUNTER — TELEPHONE (OUTPATIENT)
Dept: ORTHOPEDIC SURGERY | Age: 52
End: 2022-07-01

## 2022-07-01 DIAGNOSIS — S83.412D SPRAIN OF MEDIAL COLLATERAL LIGAMENT OF LEFT KNEE, SUBSEQUENT ENCOUNTER: ICD-10-CM

## 2022-07-01 DIAGNOSIS — S83.242D ACUTE MEDIAL MENISCUS TEAR OF LEFT KNEE, SUBSEQUENT ENCOUNTER: ICD-10-CM

## 2022-07-01 DIAGNOSIS — Z09 S/P ORTHOPEDIC SURGERY, FOLLOW-UP EXAM: Primary | ICD-10-CM

## 2022-07-01 RX ORDER — DICLOFENAC SODIUM 75 MG/1
75 TABLET, DELAYED RELEASE ORAL 2 TIMES DAILY
Qty: 60 TABLET | Refills: 1 | Status: SHIPPED | OUTPATIENT
Start: 2022-07-01 | End: 2022-07-31

## 2022-07-01 NOTE — TELEPHONE ENCOUNTER
I spoke with the patient and she says she is having pain in her shin and in her knee that is not resolving. She was given a knee brace at her post-op appt last week which she does not think is helping. She is leaving for the beach tomorrow morning and wanted to come in to see us today. I told her that Dr. Rafita Izaguirre and Anuj Echeverria are not in the office today. She is still using a crutch to ambulate. I told her to continue using the crutches and the knee brace, icing her knee, and take an anti-inflammatory. She asked if Dr. Rafita Izaguirre could prescribe one for her so I have reached out to him and he will prescribe one accordingly.

## 2022-07-01 NOTE — TELEPHONE ENCOUNTER
She had meniscus surgery on the 10th and she was put in a knee brace. Yesterday her knee and the front of her shin was so painful. Her PT told her to give it the rest of the afternoon and it's still bothering her. She is leaving for the beach tomorrow. Please call.

## 2022-07-06 NOTE — TELEPHONE ENCOUNTER
Pt called again and the medication didn't help. She wants to be seen when she returns next week. She's worried something is wrong with her leg. Please call pt to schedule.

## 2022-07-11 ENCOUNTER — OFFICE VISIT (OUTPATIENT)
Dept: ORTHOPEDIC SURGERY | Age: 52
End: 2022-07-11
Payer: COMMERCIAL

## 2022-07-11 DIAGNOSIS — Z09 S/P ORTHOPEDIC SURGERY, FOLLOW-UP EXAM: ICD-10-CM

## 2022-07-11 DIAGNOSIS — S83.242D ACUTE MEDIAL MENISCUS TEAR OF LEFT KNEE, SUBSEQUENT ENCOUNTER: ICD-10-CM

## 2022-07-11 DIAGNOSIS — M70.52 PES ANSERINUS BURSITIS OF LEFT KNEE: Primary | ICD-10-CM

## 2022-07-11 PROCEDURE — 99024 POSTOP FOLLOW-UP VISIT: CPT | Performed by: ORTHOPAEDIC SURGERY

## 2022-07-11 PROCEDURE — 20610 DRAIN/INJ JOINT/BURSA W/O US: CPT | Performed by: ORTHOPAEDIC SURGERY

## 2022-07-11 RX ORDER — TRIAMCINOLONE ACETONIDE 40 MG/ML
40 INJECTION, SUSPENSION INTRA-ARTICULAR; INTRAMUSCULAR ONCE
Status: COMPLETED | OUTPATIENT
Start: 2022-07-11 | End: 2022-07-11

## 2022-07-11 RX ADMIN — TRIAMCINOLONE ACETONIDE 40 MG: 40 INJECTION, SUSPENSION INTRA-ARTICULAR; INTRAMUSCULAR at 11:19

## 2022-07-11 NOTE — PROGRESS NOTES
Name: Jasper Paul  YOB: 1970  Gender: female  MRN: [id number]      CC: Follow-up (left knee)       HPI: Jasper Paul is a 49 y.o. female who returns for follow up on her left knee. She is about 4.5 weeks s/p partial medial menisectomy on 06/10/2022. Around 6/30/2022 she started having anterior proximal shin pain. She describes the pain as sharp and worse with weightbearing. She thinks the medial side of her knee has improved. She has been taking Diclofenac, Tylenol and using one crutch to ambulate. Physical Examination:  General: no acute distress  Lungs: breathing easily  CV: regular rhythm by pulse  Left Knee: Her incisions are well-healed she has no effusion she has full passive and active range of motion of the knee she has focal tenderness to palpation over the pes tendons and pes bursa with recreation of her symptoms. Nontender over the bone        Assessment:     ICD-10-CM    1. Pes anserinus bursitis of left knee  M70.52    2. S/P orthopedic surgery, follow-up exam  Z09    3. Acute medial meniscus tear of left knee, subsequent encounter  S83.242D        Plan:   I think the majority of her symptoms are pes tendinitis. I think she would benefit from physical therapy treated directly at this. Continue the diclofenac we also discussed a pes bursa injection today which she elected to proceed with. I will see her back in about a month. After informed verbal consent was obtained the area of maximal tenderness over the pes bursa was injected with 3 cc of 0.25% Marcaine and 1 cc of 40 mg Kenalog. The patient tolerated the injection well and was given post injection flare precautions. Hardik Nevarez MD, [facility]

## 2022-07-13 ENCOUNTER — OFFICE VISIT (OUTPATIENT)
Dept: ORTHOPEDIC SURGERY | Age: 52
End: 2022-07-13
Payer: COMMERCIAL

## 2022-07-13 DIAGNOSIS — M25.562 LEFT KNEE PAIN, UNSPECIFIED CHRONICITY: ICD-10-CM

## 2022-07-13 DIAGNOSIS — Z78.9 DECREASED ACTIVITIES OF DAILY LIVING (ADL): ICD-10-CM

## 2022-07-13 DIAGNOSIS — M25.662 DECREASED RANGE OF MOTION OF LEFT KNEE: ICD-10-CM

## 2022-07-13 DIAGNOSIS — M25.469 KNEE SWELLING: ICD-10-CM

## 2022-07-13 DIAGNOSIS — Z09 S/P ORTHOPEDIC SURGERY, FOLLOW-UP EXAM: Primary | ICD-10-CM

## 2022-07-13 DIAGNOSIS — R26.2 DIFFICULTY WALKING: ICD-10-CM

## 2022-07-13 DIAGNOSIS — R29.898 IMPAIRED STRENGTH OF LOWER EXTREMITY: ICD-10-CM

## 2022-07-13 DIAGNOSIS — R68.89 DECREASED ACTIVITY TOLERANCE: ICD-10-CM

## 2022-07-13 PROCEDURE — 97110 THERAPEUTIC EXERCISES: CPT

## 2022-07-13 PROCEDURE — 97035 APP MDLTY 1+ULTRASOUND EA 15: CPT

## 2022-07-13 NOTE — PROGRESS NOTES
111 [facility]  [address] [facility],[address] [address]  Dept: [phone]      PHYSICAL THERAPY DAILY NOTE     POC ENDS:  8/16/2022      PT SESSION 7 of 99/CAL YEAR    TOTAL TIMED PROCEDURE CODES: 54 MIN. TOTAL TIME: 55 MIN. DATE of SURGERY: Fri 6/10/22    4+ WEEK POST OP    REFERRING MD: Susan Kilpatrick MD  DIAGNOSIS:    Diagnosis Orders   1. S/P orthopedic surgery, follow-up exam     2. Knee swelling     3. Decreased activity tolerance     4. Left knee pain, unspecified chronicity     5. Impaired strength of lower extremity     6. Decreased activities of daily living (ADL)     7. Difficulty walking     8. Decreased range of motion of left knee       SURGERY: Left knee arthroscopy with partial medial meniscectomy/debridement DATE: Fri 6/10/22    THERAPY PRECAUTIONS:Adhesive/tape allergy; RTKA 2019  CO - MORBIDITIES AFFECTING PLAN OF CARE: Adhesive sensitivity.     PERTINENT MEDICAL HISTORY     PAST MEDICAL AND SURGICAL HISTORY:  Past Medical History:   Diagnosis Date    Atypical ductal hyperplasia of right breast     per pt benign    Chronic pain     R knee    Colitis     Depression     HTN (hypertension)     managed with med    Sleep apnea     per pt is in the process of getting a new CPAP    Ulcerative colitis ([facility] [address]       Past Surgical History:   Procedure Laterality Date    BREAST BIOPSY Right     RIGHT BREAST NEEDLE LOCALIZED BIOPSY performed by Jodi Kearney MD at [address] [place] [address] Right 2019    KNEE ARTHROSCOPY Left 6/10/2022    LEFT KNEE ARTHROSCOPY MEDIAL MENISCAL DEBRIDEMENT performed by Thu Lizama MD at [facility] STEROTACTIC LOC BREAST BIOPSY RIGHT Right 07/16/2018    [facility] STEROTACTIC LOC BREAST BIOPSY RIGHT 7/16/2018 E RADIOLOGY MAMMO    ORTHOPEDIC SURGERY Right 06/2017    Knee     MEDICATIONS: reviewed in chart   ALLERGIES:   Allergies   Allergen Reactions    Codeine Other (See Comments)     Makes her \"wired\" hyper    Penicillins Rash    Morphine Other (See Comments)     Grandmother had brain stem stroke with this    Adhesive Tape Rash      Social/Functional Hx: How would you rate your overall health? fair  Pt lives with independent spouse in a(n) 1 story house with entry steps. Current DME: crutches and Iceman. Work Status: Employed full time: [employer]      Patient's Stated Goals: To walk w/o pain. SUBJECTIVE     Pt had appt with MD on Wed 7/11 where she received an injection in L medial knee (pes anserine). At time of treatment today, pt notes overall 1-2 L knee pain. She arrives ambulating without assistive device. She continues to notes mild to moderate pain during ambulation, however, is gradually and consistently improving after injection. pt says is \"trying\" not to use crutch, \"I don't want to become dependent on it\". Patient reports compliance with wearing brace for MCL, if she is standing, walking a lot. Says if she is at home and sitting is without it. OBJECTIVE     FINDINGS:  Observation:   Posture: L LE alignment unremarkable. Swelling/Edema: Moderate about the L knee  Skin Integrity: Portals closed. Palpation: mild tenderness medial L knee, over the pes anserinus. Atrophy: none noted. LEFS FUNCTIONAL QUESTIONNAIRE 6/13/22   RAW SCORE 39/80   % FUNCTION 49   % DYSFUNCTION/LIMITATION 51     PAIN RATING (0 - 10) 6/13/22 6/28/22 7/13/22   PRE 0 0 1-2   pes anserine   POST        LEFT KNEE AROM (?) 6/13/22 6/17/22 6/21/22   PRE 6 - 120 3 - 145 2 - 143   POST          Function:  Gait: independent w/o assistive device. Demonstrates improving heel strike and toe off mechanics  Stair management: deferred.   Sit to stand: independent w weight shifted over the R foot  Balance: good      Today's treatment consisted:  PATIENT EDUCATION: Encouraged patient to add ice massage to painful area of the medial knee and leg.         Anterior proximal shin in are of complaint    27351 - ULTRASOUND x 8 MIN: to medial L knee, at point of tenderness; at 1.0 MHz, 1.0 w/cm2 , continuous, to assist in reducing pain, muscle spasm/soft tissue restrictions. 00316 - THERAPEUTIC EXERCISE: 52 MIN. Addressing pain and deficits related to ROM and mm function - force production, endurance, neuromuscular coordination for completing ADLs. Exercises:  Recumbent knee (seat position/intensity/duration): 4/2/5. Bridge with hip adduction ball squeeze: 1/20  Bridge with hip abduction - red: 1/20  SDLY clamshells - red tb above knees: 2/20 B  SAQ over foam roller - 2x10 B with 2-3 sec hold at knee extension  HSC - orange loop (sitting on plinth, opposite foot on stool) 2x10B              Calf raises x20  slantboard calf stretch: 2x1min  Step ups: 4\" (grn) x15 B  Lateral step up and over: 4\" (grn) x10    Access Code: [email or web address]  URL: [email or web address]  Date: 06/17/2022  Prepared by: Osiris Shelley    . - pt to ice at home      ASSESSMENT     Pt able to complete NWB exercises without increase of knee/shin pain, however, note occasional episode of sharp (mild) pain in area of pes anserine. She is able to complete treatment activities without complaint of L knee joint line pain. Encouraged pt to continue using ice to address swelling, and ice ball on area of pes anserine. PT GOALS ATTAINED:  6/24/22:  Patient will voice understanding of symptom limited activity principle. Patient will report compliance w home program of exercise, cold and elevation. Patient will demonstrate independent skill w initial HEP. Extension deficit of involved knee will improve to ? 3? Micky Fierro Flexion of involved knee will improve to ?130? Micky Fierro PLAN     Progress rehab exercise program as tolerated. Use cold or vaso PRN. GOALS     SHORT - TERM GOALS TO BE ATTAINED BY 8/15/22:  Extension deficit of involved knee resolved.   Flexion of involved knee will improve to ?130? Dario Larkin LONG - TERM GOALS TO BE ATTAINED BY 8/5/22:  Patient will return to normal sleep pattern re quality and duration. Patient will attain full knee extension in order to be able to ambulate w/ appropriate knee extension during WBing phase of gait cycle. Patient will attain knee flexion ? 130? in order to be able to perform activities involving squatting or kneeling. Patient will attain LQ function - ROM, mm strength - endurance, coordination of movement - that allows patient to:  - negotiate stairs w a reciprocal gait pattern. - negotiate uneven ground safely and w/o need of assistive device. - resume desired activities/attain patient goals of: walking w/o pain    [employer]  Access Code: [email or web address]  URL: [email or web address]  Date: 06/13/2022  Prepared by: Argenis Joseph    Exercises  Supine Heel Slide with Small Ball - 4 x daily - 6 x weekly - 1 sets  Supine Knee Extension Strengthening - 2 x daily - 6 x weekly - 1 sets - 5 reps - 10 sec hold  Supine Quad Set - 4 x daily - 6 x weekly - 1 sets - 5 reps - 10 hold (sec)  Active Straight Leg Raise with Quad Set - 2 x daily - 6 x weekly - 10 reps - 1 sets

## 2022-07-14 NOTE — PROGRESS NOTES
[facility] [address] [facility]  [address] [facility],[address] [address]  Dept: [phone]      PHYSICAL THERAPY DAILY NOTE     POC ENDS:  8/16/2022      PT SESSION 8 of 99 /CAL YEAR    TOTAL TIMED PROCEDURE CODES: 60 MIN. TOTAL TIME: 60 MIN. DATE of SURGERY: Fri 6/10/22    5 WEEK POST OP    REFERRING MD: Jacoby Easton MD  DIAGNOSIS:    Diagnosis Orders   1. S/P orthopedic surgery, follow-up exam     2. Knee swelling     3. Decreased activity tolerance     4. Left knee pain, unspecified chronicity     5. Impaired strength of lower extremity     6. Decreased activities of daily living (ADL)     7. Difficulty walking     8. Decreased range of motion of left knee       SURGERY: Left knee arthroscopy with partial medial meniscectomy/debridement DATE: Fri 6/10/22    THERAPY PRECAUTIONS:Adhesive/tape allergy; RTKA 2019  CO - MORBIDITIES AFFECTING PLAN OF CARE: Adhesive sensitivity.     PERTINENT MEDICAL HISTORY     PAST MEDICAL AND SURGICAL HISTORY:  Past Medical History:   Diagnosis Date    Atypical ductal hyperplasia of right breast     per pt benign    Chronic pain     R knee    Colitis     Depression     HTN (hypertension)     managed with med    Sleep apnea     per pt is in the process of getting a new CPAP    Ulcerative colitis ([facility] [address]       Past Surgical History:   Procedure Laterality Date    BREAST BIOPSY Right     RIGHT BREAST NEEDLE LOCALIZED BIOPSY performed by Keagan Stewart MD at [facility] Right 2019    KNEE ARTHROSCOPY Left 6/10/2022    LEFT KNEE ARTHROSCOPY MEDIAL MENISCAL DEBRIDEMENT performed by Berto Justice MD at [address] [place] [address] STEROTACTIC LOC BREAST BIOPSY RIGHT Right 07/16/2018    [facility] STEROTACTIC LOC BREAST BIOPSY RIGHT 7/16/2018 E RADIOLOGY MAMMO    ORTHOPEDIC SURGERY Right 06/2017    Knee     MEDICATIONS: reviewed in chart   ALLERGIES:   Allergies   Allergen Reactions    Codeine Other (See Comments)     Makes her \"wired\" hyper    Penicillins Rash    Morphine Other (See Comments)     Grandmother had brain stem stroke with this    Adhesive Tape Rash      Social/Functional Hx: How would you rate your overall health? fair  Pt lives with independent spouse in a(n) 1 story house with entry steps. Current DME: crutches and Iceman. Work Status: Employed full time: [employer]      Patient's Stated Goals: To walk w/o pain. SUBJECTIVE     Pt primary complaint of proximal L shin pain with weightbearing, standing and walking, both. Rates WB pain at 7/10, \"comes and goes every few steps\". Pt expresses frustration and concern for continued knee pain. 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 Pt says area of injection (pes anserine) has nearly resolved. .    At time of treatment today, pt notes overall 1-2 L knee pain, which is exacerbated with WB. She arrives ambulating without assistive device, or MCL brace. Says is trying to wean herself from it. OBJECTIVE     FINDING:  Observation:   Posture: L LE alignment unremarkable. Swelling/Edema: Moderate about the L knee  Skin Integrity: Portals closed. Palpation: mild tenderness medial L knee, over the pes anserinus. Atrophy: none noted. LEFS FUNCTIONAL QUESTIONNAIRE 6/13/22   RAW SCORE 39/80   % FUNCTION 49   % DYSFUNCTION/LIMITATION 51     PAIN RATING (0 - 10) 6/13/22 6/28/22 7/13/22   PRE 0 0 1-2   pes anserine   POST        LEFT KNEE AROM (?) 6/13/22 6/17/22 6/21/22   PRE 6 - 120 3 - 145 2 - 143   POST          Function:  Gait: independent w/o assistive device. Antalgic proximal shin intermittent pain.   Stair management: states is using step-to gait with steps entering house   Sit to stand: independent w weight shifted over the R foot  Balance: good      Today's treatment consisted:  PATIENT EDUCATION: Encouraged patient to add ice massage to painful area  anterior R robles.          12158 - ULTRASOUND x 8 MIN: to medial L knee, at point of tenderness; at 1.0 MHz, 1.0 w/cm2 , continuous, to assist in reducing pain, muscle spasm/soft tissue restrictions. 33496 - THERAPEUTIC EXERCISE: 37 MIN. Addressing pain and deficits related to ROM and mm function - force production, endurance, neuromuscular coordination for completing ADLs. Exercises:  Recumbent knee (seat position/intensity/duration): 3/2/5    Bridge with hip adduction ball squeeze: 1/10  Bridge with hip abduction - grn: 1/10  SAQ over foam roller - 2x10 B with 2-3 sec hold at knee extension 3# on ankles  L hip adduction supine hooklying ball squeeze  B SLRs:  3# 2/10 . B hip abduction: 3# 2/10. 4L LE weight at ankle, R LE weight above knee to to knee pain         Access Code: [email or web address]  URL: [email or web address]  Date: 06/17/2022  Prepared by: Cezar Ellington    Vasopneumatic Compression (05959) with cold x 15 minutes: to L knee in order to reduce inflammation and swelling/joint effusion which will help improve ROM and manage pain. Patient supine with LE elevated. ASSESSMENT     Today's treatment completed primarily NWB due to proximal R shin pain. Intermittent antalgic gait R proximal shin pain intermittent between mild and moderate with WB. Mild painful discomfort with SDLY abduction light resistance on ankle - resolved with resistance moved above knee. Pt able to complete NWB exercises without increase of knee/shin pain. She is able to complete treatment activities without complaint of L knee joint line pain. Encouraged pt to continue using ice to address swelling, and ice ball on area of pes anserine. PT GOALS ATTAINED:  6/24/22:  Patient will voice understanding of symptom limited activity principle. Patient will report compliance w home program of exercise, cold and elevation. Patient will demonstrate independent skill w initial HEP.   Extension deficit of involved knee will improve to ? 3? Dionte Gutter Flexion of involved knee will improve to ?130? Dionte Corinneter PLAN     Progress rehab exercise program as tolerated. Use cold or vaso PRN. GOALS     SHORT - TERM GOALS TO BE ATTAINED BY 8/15/22:  Extension deficit of involved knee resolved. Flexion of involved knee will improve to ?130? Dionte Gutter LONG - TERM GOALS TO BE ATTAINED BY 8/5/22:  Patient will return to normal sleep pattern re quality and duration. Patient will attain full knee extension in order to be able to ambulate w/ appropriate knee extension during WBing phase of gait cycle. Patient will attain knee flexion ? 130? in order to be able to perform activities involving squatting or kneeling. Patient will attain LQ function - ROM, mm strength - endurance, coordination of movement - that allows patient to:  negotiate stairs w a reciprocal gait pattern. negotiate uneven ground safely and w/o need of assistive device. resume desired activities/attain patient goals of: walking w/o pain    [employer]  Access Code: [email or web address]  URL: [email or web address]  Date: 06/13/2022  Prepared by: Sameer Richards    Exercises  Supine Heel Slide with Small Ball - 4 x daily - 6 x weekly - 1 sets  Supine Knee Extension Strengthening - 2 x daily - 6 x weekly - 1 sets - 5 reps - 10 sec hold  Supine Quad Set - 4 x daily - 6 x weekly - 1 sets - 5 reps - 10 hold (sec)  Active Straight Leg Raise with Quad Set - 2 x daily - 6 x weekly - 10 reps - 1 sets

## 2022-07-15 ENCOUNTER — OFFICE VISIT (OUTPATIENT)
Dept: ORTHOPEDIC SURGERY | Age: 52
End: 2022-07-15
Payer: COMMERCIAL

## 2022-07-15 DIAGNOSIS — Z78.9 DECREASED ACTIVITIES OF DAILY LIVING (ADL): ICD-10-CM

## 2022-07-15 DIAGNOSIS — R68.89 DECREASED ACTIVITY TOLERANCE: ICD-10-CM

## 2022-07-15 DIAGNOSIS — M25.469 KNEE SWELLING: ICD-10-CM

## 2022-07-15 DIAGNOSIS — R29.898 IMPAIRED STRENGTH OF LOWER EXTREMITY: ICD-10-CM

## 2022-07-15 DIAGNOSIS — M25.662 DECREASED RANGE OF MOTION OF LEFT KNEE: ICD-10-CM

## 2022-07-15 DIAGNOSIS — M25.562 LEFT KNEE PAIN, UNSPECIFIED CHRONICITY: ICD-10-CM

## 2022-07-15 DIAGNOSIS — Z09 S/P ORTHOPEDIC SURGERY, FOLLOW-UP EXAM: Primary | ICD-10-CM

## 2022-07-15 DIAGNOSIS — R26.2 DIFFICULTY WALKING: ICD-10-CM

## 2022-07-15 PROCEDURE — 97035 APP MDLTY 1+ULTRASOUND EA 15: CPT

## 2022-07-15 PROCEDURE — 97016 VASOPNEUMATIC DEVICE THERAPY: CPT

## 2022-07-15 PROCEDURE — 97110 THERAPEUTIC EXERCISES: CPT

## 2022-07-19 ENCOUNTER — OFFICE VISIT (OUTPATIENT)
Dept: ORTHOPEDIC SURGERY | Age: 52
End: 2022-07-19
Payer: COMMERCIAL

## 2022-07-19 DIAGNOSIS — M25.562 LEFT KNEE PAIN, UNSPECIFIED CHRONICITY: ICD-10-CM

## 2022-07-19 DIAGNOSIS — M25.662 DECREASED RANGE OF MOTION OF LEFT KNEE: ICD-10-CM

## 2022-07-19 DIAGNOSIS — R68.89 DECREASED ACTIVITY TOLERANCE: ICD-10-CM

## 2022-07-19 DIAGNOSIS — Z78.9 DECREASED ACTIVITIES OF DAILY LIVING (ADL): ICD-10-CM

## 2022-07-19 DIAGNOSIS — M25.469 KNEE SWELLING: ICD-10-CM

## 2022-07-19 DIAGNOSIS — Z09 S/P ORTHOPEDIC SURGERY, FOLLOW-UP EXAM: Primary | ICD-10-CM

## 2022-07-19 DIAGNOSIS — R29.898 IMPAIRED STRENGTH OF LOWER EXTREMITY: ICD-10-CM

## 2022-07-19 DIAGNOSIS — R26.2 DIFFICULTY WALKING: ICD-10-CM

## 2022-07-19 PROCEDURE — 97110 THERAPEUTIC EXERCISES: CPT | Performed by: PHYSICAL THERAPIST

## 2022-07-19 PROCEDURE — 97016 VASOPNEUMATIC DEVICE THERAPY: CPT | Performed by: PHYSICAL THERAPIST

## 2022-07-19 NOTE — PROGRESS NOTES
[facility] [address] [place] [address],[address] [address]  Dept: [phone]      PHYSICAL THERAPY DAILY NOTE     POC ENDS:  8/16/2022      PT SESSION 9 of 99 /CAL YEAR    TOTAL TIMED PROCEDURE CODES: 60 MIN. TOTAL TIME: 60 MIN. DATE of SURGERY: Fri 6/10/22    5 WEEK POST OP    REFERRING MD: Janina Mims MD  DIAGNOSIS:    Diagnosis Orders   1. S/P orthopedic surgery, follow-up exam        2. Left knee pain, unspecified chronicity        3. Difficulty walking        4. Knee swelling        5. Impaired strength of lower extremity        6. Decreased range of motion of left knee        7. Decreased activity tolerance        8. Decreased activities of daily living (ADL)            SURGERY: Left knee arthroscopy with partial medial meniscectomy/debridement DATE: Fri 6/10/22    THERAPY PRECAUTIONS:Adhesive/tape allergy; RTKA 2019  CO - MORBIDITIES AFFECTING PLAN OF CARE: Adhesive sensitivity.     PERTINENT MEDICAL HISTORY     PAST MEDICAL AND SURGICAL HISTORY:  Past Medical History:   Diagnosis Date    Atypical ductal hyperplasia of right breast     per pt benign    Chronic pain     R knee    Colitis     Depression     HTN (hypertension)     managed with med    Sleep apnea     per pt is in the process of getting a new CPAP    Ulcerative colitis ([facility] [address]       Past Surgical History:   Procedure Laterality Date    BREAST BIOPSY Right     RIGHT BREAST NEEDLE LOCALIZED BIOPSY performed by Shun Ronquillo MD at [facility] Right 2019    KNEE ARTHROSCOPY Left 6/10/2022    LEFT KNEE ARTHROSCOPY MEDIAL MENISCAL DEBRIDEMENT performed by Ruthy Quiroga MD at [address] [place] [address] STEROTACTIC LOC BREAST BIOPSY RIGHT Right 07/16/2018    [facility] STEROTACTIC LOC BREAST BIOPSY RIGHT 7/16/2018 E RADIOLOGY MAMMO    ORTHOPEDIC SURGERY Right 06/2017    Knee     MEDICATIONS: reviewed in chart   ALLERGIES:   Allergies Allergen Reactions    Codeine Other (See Comments)     Makes her \"wired\" hyper    Penicillins Rash    Morphine Other (See Comments)     Grandmother had brain stem stroke with this    Adhesive Tape Rash      Social/Functional Hx: How would you rate your overall health? fair  Pt lives with independent spouse in a(n) 1 story house with entry steps. Current DME: crutches and Iceman. Work Status: Employed full time: [employer]      Patient's Stated Goals: To walk w/o pain. SUBJECTIVE     I had a good weekend but started having anterior knee pain yesterday. Did not sleep well last night. Increased pain w Wbing. Knee throbs. Pt primary complaint of proximal L shin pain with weightbearing, standing and walking, both. Rates WB pain at 7/10, \"comes and goes every few steps\". Pt expresses frustration and concern for continued knee pain. 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 Pt says area of injection (pes anserine) has nearly resolved. .    At time of treatment today, pt notes overall 1-2 L knee pain, which is exacerbated with WB. She arrives ambulating without assistive device, or MCL brace. Says is trying to wean herself from it. OBJECTIVE     FINDING:  Observation:   Posture: L LE alignment unremarkable. Swelling/Edema: Moderate about the L knee  Skin Integrity: Portals closed. Palpation: NO tenderness noted over the tibial tubercle or the patellar tendon; mild tenderness in the pes anserine area but per patient this does not provoke the complaint. RESPONSE TO RESISTED TESTING:Resisted knee extension and resisted flexion does not provoke complaint. RESPONSE TO PASSIVE MOTION: Complaint noted at end range in both flexion and extension w overpressure exacerbating pain complaint. Atrophy: none noted.     LEFS FUNCTIONAL QUESTIONNAIRE 6/13/22   RAW SCORE 39/80   % FUNCTION 49   % DYSFUNCTION/LIMITATION 51     PAIN RATING (0 - 10) 6/13/22 6/28/22 7/13/22 7/19/22   PRE 0 0 1-2   pes anserine 6 NWB  9 WB   POST         LEFT KNEE AROM (?) 6/13/22 6/17/22 6/21/22   PRE 6 - 120 3 - 145 2 - 143   POST          Function:  Gait: independent w/o assistive device. Antalgic proximal shin intermittent pain. Stair management: states is using step-to gait with steps entering house   Sit to stand: independent w weight shifted over the R foot  Balance: good      Today's treatment consisted:  PATIENT EDUCATION: Reinforced activity modification and Encouraged patient to add ice massage to painful area  anterior R shin. 46763 - THERAPEUTIC EXERCISE: 40 MIN. Addressing pain and deficits related to ROM and mm function - force production, endurance, neuromuscular coordination for completing ADLs. Exercises:  Recumbent knee (seat position/intensity/duration): 4/3/5  L quad sets: 10 sec hold/5  L SLRs: 2#/12. 3#/12. 4#/12. 5#/8. L hip abduction: 4#/12. 5#/12. 6#/12. Access Code: [email or web address]  URL: [email or web address]  Date: 06/17/2022  Prepared by: Radha Blas    80849 - VASOPNEUMATIC DEVICE w TEMPERATURE SET @ 34°: 15 MIN. Game Ready to the L knee w the LE elevated for management of pain and swelling. ASSESSMENT     Patient comes in w new complaint of knee pain which does not appear to be contractile but related to non contractile structures of the knee. Today's treatment consisted of assessment of complaint f/b open chain exercises to improve mm function of the L hip and thigh. Vaso utilized post session for pain and swelling management. Post session 'pain is about the same' - patient able to complete exercise to improve function of the LQ w/o provocation. Continuation of PT indicated addressing complaints. PT GOALS ATTAINED:  6/24/22:  Patient will voice understanding of symptom limited activity principle. Patient will report compliance w home program of exercise, cold and elevation.   Patient will demonstrate independent skill w initial HEP. Extension deficit of involved knee will improve to ? 3? Gabriella Gardner Flexion of involved knee will improve to ?130? Gabriella Gardner PLAN     Progress rehab exercise program as tolerated. Use cold or vaso PRN. GOALS     SHORT - TERM GOALS TO BE ATTAINED BY 8/15/22:  Extension deficit of involved knee resolved. Flexion of involved knee will improve to ?130? Gabriella Gardner LONG - TERM GOALS TO BE ATTAINED BY 8/5/22:  Patient will return to normal sleep pattern re quality and duration. Patient will attain full knee extension in order to be able to ambulate w/ appropriate knee extension during WBing phase of gait cycle. Patient will attain knee flexion ? 130? in order to be able to perform activities involving squatting or kneeling. Patient will attain LQ function - ROM, mm strength - endurance, coordination of movement - that allows patient to:  negotiate stairs w a reciprocal gait pattern. negotiate uneven ground safely and w/o need of assistive device. resume desired activities/attain patient goals of: walking w/o pain    [employer]  Access Code: [email or web address]  URL: [email or web address]  Date: 06/13/2022  Prepared by: Lily Velasquez    Exercises  Supine Heel Slide with Small Ball - 4 x daily - 6 x weekly - 1 sets  Supine Knee Extension Strengthening - 2 x daily - 6 x weekly - 1 sets - 5 reps - 10 sec hold  Supine Quad Set - 4 x daily - 6 x weekly - 1 sets - 5 reps - 10 hold (sec)  Active Straight Leg Raise with Quad Set - 2 x daily - 6 x weekly - 10 reps - 1 sets

## 2022-08-02 ENCOUNTER — OFFICE VISIT (OUTPATIENT)
Dept: ORTHOPEDIC SURGERY | Age: 52
End: 2022-08-02

## 2022-08-02 DIAGNOSIS — M25.562 LEFT KNEE PAIN, UNSPECIFIED CHRONICITY: Primary | ICD-10-CM

## 2022-08-05 ENCOUNTER — TELEPHONE (OUTPATIENT)
Dept: ORTHOPEDIC SURGERY | Age: 52
End: 2022-08-05

## 2022-08-09 ENCOUNTER — OFFICE VISIT (OUTPATIENT)
Dept: ORTHOPEDIC SURGERY | Age: 52
End: 2022-08-09
Payer: COMMERCIAL

## 2022-08-09 DIAGNOSIS — M70.52 PES ANSERINUS BURSITIS OF LEFT KNEE: Primary | ICD-10-CM

## 2022-08-09 DIAGNOSIS — Z09 S/P ORTHOPEDIC SURGERY, FOLLOW-UP EXAM: ICD-10-CM

## 2022-08-09 DIAGNOSIS — S83.242D ACUTE MEDIAL MENISCUS TEAR OF LEFT KNEE, SUBSEQUENT ENCOUNTER: ICD-10-CM

## 2022-08-09 DIAGNOSIS — S83.412D SPRAIN OF MEDIAL COLLATERAL LIGAMENT OF LEFT KNEE, SUBSEQUENT ENCOUNTER: ICD-10-CM

## 2022-08-09 PROCEDURE — 99212 OFFICE O/P EST SF 10 MIN: CPT | Performed by: SPECIALIST/TECHNOLOGIST

## 2022-08-09 NOTE — PROGRESS NOTES
Name: Mirta Grullon  YOB: 1970  Gender: female  MRN: [id number]      CC: Follow-up (Left knee)       HPI: Mirta Grullon is a 47 y.o. female who returns for follow up on her left knee. She is about 2 months s/p partial medial menisectomy. Date of surgery: 06/10/2022. She had very good relief of pain after her pes bursa injection a month ago. She continues to wear the hinged knee brace and reports pain when she does not have it on. Physical Examination:  General: no acute distress  Lungs: breathing easily  CV: regular rhythm by pulse  Left Knee: Negative effusion. No tenderness to palpation of the medial joint line pes bursa. Full active and passive range of motion with no pain in the extreme. Negative ligamentous exam x4. Negative Young's. Assessment:     ICD-10-CM    1. Pes anserinus bursitis of left knee  M70.52       2. S/P orthopedic surgery, follow-up exam  Z09       3. Acute medial meniscus tear of left knee, subsequent encounter  S83.242D           Plan:   Patient is recovering well status post medial meniscectomy. She also got good relief from the pes anserine bursa injection that she received at her last visit. She reports that she has some difficulty with ascending stairs and I educated her on a strengthening program to help improve her eccentric quad strength. I think at this point she can return to see me as needed.           Irvin Gamino, P  Orthopaedics and Sports Medicine

## 2022-08-29 DIAGNOSIS — S83.412D SPRAIN OF MEDIAL COLLATERAL LIGAMENT OF LEFT KNEE, SUBSEQUENT ENCOUNTER: ICD-10-CM

## 2022-08-29 DIAGNOSIS — S83.242D ACUTE MEDIAL MENISCUS TEAR OF LEFT KNEE, SUBSEQUENT ENCOUNTER: ICD-10-CM

## 2022-08-29 DIAGNOSIS — Z09 S/P ORTHOPEDIC SURGERY, FOLLOW-UP EXAM: ICD-10-CM

## 2022-08-29 RX ORDER — DICLOFENAC SODIUM 75 MG/1
TABLET, DELAYED RELEASE ORAL
Qty: 60 TABLET | Refills: 1 | OUTPATIENT
Start: 2022-08-29

## 2022-10-18 ENCOUNTER — TRANSCRIBE ORDERS (OUTPATIENT)
Dept: SCHEDULING | Age: 52
End: 2022-10-18

## 2022-10-18 DIAGNOSIS — Z12.31 SCREENING MAMMOGRAM FOR HIGH-RISK PATIENT: Primary | ICD-10-CM

## 2022-10-29 ENCOUNTER — HOSPITAL ENCOUNTER (OUTPATIENT)
Dept: MAMMOGRAPHY | Age: 52
Discharge: HOME | End: 2022-11-01
Payer: COMMERCIAL

## 2022-10-29 DIAGNOSIS — Z12.31 SCREENING MAMMOGRAM FOR HIGH-RISK PATIENT: ICD-10-CM

## 2022-10-29 PROCEDURE — 77063 BREAST TOMOSYNTHESIS BI: CPT

## 2023-02-01 ENCOUNTER — HOSPITAL ENCOUNTER (OUTPATIENT)
Dept: GENERAL RADIOLOGY | Age: 53
Discharge: HOME | End: 2023-02-04

## 2023-02-01 DIAGNOSIS — T14.90XA INJURY: ICD-10-CM

## 2023-02-01 PROCEDURE — 73070 X-RAY EXAM OF ELBOW: CPT

## 2023-03-21 ENCOUNTER — TELEPHONE (OUTPATIENT)
Dept: FAMILY MEDICINE CLINIC | Facility: CLINIC | Age: 53
End: 2023-03-21

## 2023-03-21 DIAGNOSIS — K04.7 DENTAL ABSCESS: Primary | ICD-10-CM

## 2023-03-21 RX ORDER — CLINDAMYCIN HYDROCHLORIDE 300 MG/1
300 CAPSULE ORAL 2 TIMES DAILY
Qty: 14 CAPSULE | Refills: 0 | Status: SHIPPED | OUTPATIENT
Start: 2023-03-21 | End: 2023-03-28

## 2023-03-21 NOTE — TELEPHONE ENCOUNTER
Patient is having an emergency dental procedure and her dentist will not call her in an antibiotic?   Was told to call her family physician and she is allergic to penicillin

## 2023-04-10 PROBLEM — F32.5 MAJOR DEPRESSIVE DISORDER, SINGLE EPISODE, IN FULL REMISSION (HCC): Status: ACTIVE | Noted: 2023-04-10

## 2023-07-12 DIAGNOSIS — K04.7 DENTAL ABSCESS: ICD-10-CM

## 2023-07-12 RX ORDER — CLINDAMYCIN HYDROCHLORIDE 300 MG/1
300 CAPSULE ORAL 2 TIMES DAILY
Qty: 14 CAPSULE | Refills: 0 | Status: SHIPPED | OUTPATIENT
Start: 2023-07-12 | End: 2023-07-19

## 2023-07-13 ENCOUNTER — TRANSCRIBE ORDERS (OUTPATIENT)
Dept: SCHEDULING | Age: 53
End: 2023-07-13

## 2023-07-13 DIAGNOSIS — Z12.31 VISIT FOR SCREENING MAMMOGRAM: Primary | ICD-10-CM

## 2023-07-27 DIAGNOSIS — N39.41 URGE INCONTINENCE: ICD-10-CM

## 2023-07-31 ENCOUNTER — CLINICAL DOCUMENTATION (OUTPATIENT)
Dept: ORTHOPEDIC SURGERY | Age: 53
End: 2023-07-31

## 2023-08-03 DIAGNOSIS — F41.9 ANXIETY AND DEPRESSION: ICD-10-CM

## 2023-08-03 DIAGNOSIS — F32.A ANXIETY AND DEPRESSION: ICD-10-CM

## 2023-08-07 RX ORDER — BUPROPION HYDROCHLORIDE 150 MG/1
150 TABLET ORAL EVERY MORNING
Qty: 30 TABLET | Refills: 3 | Status: SHIPPED | OUTPATIENT
Start: 2023-08-07

## 2023-08-21 ENCOUNTER — OFFICE VISIT (OUTPATIENT)
Dept: FAMILY MEDICINE CLINIC | Facility: CLINIC | Age: 53
End: 2023-08-21
Payer: COMMERCIAL

## 2023-08-21 VITALS
BODY MASS INDEX: 33.06 KG/M2 | WEIGHT: 175.1 LBS | SYSTOLIC BLOOD PRESSURE: 122 MMHG | OXYGEN SATURATION: 97 % | RESPIRATION RATE: 15 BRPM | TEMPERATURE: 98.4 F | HEIGHT: 61 IN | DIASTOLIC BLOOD PRESSURE: 76 MMHG | HEART RATE: 73 BPM

## 2023-08-21 DIAGNOSIS — H61.22 IMPACTED CERUMEN OF LEFT EAR: Primary | ICD-10-CM

## 2023-08-21 PROCEDURE — 99213 OFFICE O/P EST LOW 20 MIN: CPT | Performed by: FAMILY MEDICINE

## 2023-08-21 PROCEDURE — 3078F DIAST BP <80 MM HG: CPT | Performed by: FAMILY MEDICINE

## 2023-08-21 PROCEDURE — 3074F SYST BP LT 130 MM HG: CPT | Performed by: FAMILY MEDICINE

## 2023-08-21 ASSESSMENT — PATIENT HEALTH QUESTIONNAIRE - PHQ9
SUM OF ALL RESPONSES TO PHQ QUESTIONS 1-9: 0
2. FEELING DOWN, DEPRESSED OR HOPELESS: 0
9. THOUGHTS THAT YOU WOULD BE BETTER OFF DEAD, OR OF HURTING YOURSELF: 0
SUM OF ALL RESPONSES TO PHQ9 QUESTIONS 1 & 2: 0
SUM OF ALL RESPONSES TO PHQ QUESTIONS 1-9: 6
SUM OF ALL RESPONSES TO PHQ QUESTIONS 1-9: 6
10. IF YOU CHECKED OFF ANY PROBLEMS, HOW DIFFICULT HAVE THESE PROBLEMS MADE IT FOR YOU TO DO YOUR WORK, TAKE CARE OF THINGS AT HOME, OR GET ALONG WITH OTHER PEOPLE: 1
7. TROUBLE CONCENTRATING ON THINGS, SUCH AS READING THE NEWSPAPER OR WATCHING TELEVISION: 0
SUM OF ALL RESPONSES TO PHQ QUESTIONS 1-9: 0
6. FEELING BAD ABOUT YOURSELF - OR THAT YOU ARE A FAILURE OR HAVE LET YOURSELF OR YOUR FAMILY DOWN: 0
3. TROUBLE FALLING OR STAYING ASLEEP: 3
4. FEELING TIRED OR HAVING LITTLE ENERGY: 3
1. LITTLE INTEREST OR PLEASURE IN DOING THINGS: 0
SUM OF ALL RESPONSES TO PHQ QUESTIONS 1-9: 6
SUM OF ALL RESPONSES TO PHQ QUESTIONS 1-9: 0
SUM OF ALL RESPONSES TO PHQ QUESTIONS 1-9: 0
SUM OF ALL RESPONSES TO PHQ QUESTIONS 1-9: 6
1. LITTLE INTEREST OR PLEASURE IN DOING THINGS: 0
5. POOR APPETITE OR OVEREATING: 0
8. MOVING OR SPEAKING SO SLOWLY THAT OTHER PEOPLE COULD HAVE NOTICED. OR THE OPPOSITE, BEING SO FIGETY OR RESTLESS THAT YOU HAVE BEEN MOVING AROUND A LOT MORE THAN USUAL: 0

## 2023-08-21 ASSESSMENT — ENCOUNTER SYMPTOMS
SHORTNESS OF BREATH: 0
VOMITING: 0
NAUSEA: 0

## 2023-08-21 NOTE — PROGRESS NOTES
PROGRESS NOTE    SUBJECTIVE:   Jared Palomares is a 47 y.o. 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 Current Outpatient Medications   Medication Sig Dispense Refill    buPROPion (WELLBUTRIN XL) 150 MG extended release tablet Take 1 tablet by mouth every morning 30 tablet 3    mirabegron (MYRBETRIQ) 50 MG TB24 Take 50 mg by mouth daily 90 tablet 1    albuterol sulfate HFA (PROVENTIL;VENTOLIN;PROAIR) 108 (90 Base) MCG/ACT inhaler TAKE 2 PUFFS BY MOUTH EVERY 4 HOURS 18 g 5    valsartan (DIOVAN) 80 MG tablet Take 1 tablet by mouth daily 90 tablet 3    amLODIPine (NORVASC) 10 MG tablet Take 1 tablet by mouth daily 90 tablet 3    traZODone (DESYREL) 50 MG tablet Take 2 tablets by mouth nightly Take 2 tab at bedtime 180 tablet 3    diclofenac (VOLTAREN) 75 MG EC tablet Take 1 tablet by mouth 2 times daily 60 tablet 1    Lidocaine (ZTLIDO) 1.8 % PTCH Apply topically every 12 hours      Cholecalciferol (VITAMIN D) 50 MCG (2000 UT) CAPS capsule Take by mouth      loratadine (CLARITIN) 10 MG capsule Take 1 capsule by mouth daily      mesalamine (LIALDA) 1.2 g EC tablet TAKE 2 TABLETS BY MOUTH EVERY DAY WITH A MEAL      pregabalin (LYRICA) 75 MG capsule Take 1 capsule by mouth 3 times daily. No current facility-administered medications for this visit.      Allergies   Allergen Reactions    Codeine Other (See Comments)     Makes her \"wired\" hyper    Penicillins Rash    Morphine Other (See Comments)

## 2023-08-29 ENCOUNTER — OFFICE VISIT (OUTPATIENT)
Dept: FAMILY MEDICINE CLINIC | Facility: CLINIC | Age: 53
End: 2023-08-29
Payer: COMMERCIAL

## 2023-08-29 DIAGNOSIS — H61.22 CERUMEN DEBRIS ON TYMPANIC MEMBRANE, LEFT: Primary | ICD-10-CM

## 2023-08-29 PROCEDURE — 69210 REMOVE IMPACTED EAR WAX UNI: CPT | Performed by: FAMILY MEDICINE

## 2023-08-29 PROCEDURE — 99213 OFFICE O/P EST LOW 20 MIN: CPT | Performed by: FAMILY MEDICINE

## 2023-08-29 ASSESSMENT — ENCOUNTER SYMPTOMS
VOMITING: 0
NAUSEA: 0
SHORTNESS OF BREATH: 0

## 2023-08-29 NOTE — PROGRESS NOTES
PROGRESS NOTE    SUBJECTIVE:   Emerson Bansal is a 47 y.o. female seen for a follow up visit regarding the following chief complaint:     Chief Complaint   Patient presents with    Cerumen Impaction           HPI patient presents the office today to have earwax removed from her left ear      Past Medical History, Past Surgical History, Family history, Social History, and Medications were all reviewed with the patient today and updated as necessary. Current Outpatient Medications   Medication Sig Dispense Refill    buPROPion (WELLBUTRIN XL) 150 MG extended release tablet Take 1 tablet by mouth every morning 30 tablet 3    mirabegron (MYRBETRIQ) 50 MG TB24 Take 50 mg by mouth daily 90 tablet 1    albuterol sulfate HFA (PROVENTIL;VENTOLIN;PROAIR) 108 (90 Base) MCG/ACT inhaler TAKE 2 PUFFS BY MOUTH EVERY 4 HOURS 18 g 5    valsartan (DIOVAN) 80 MG tablet Take 1 tablet by mouth daily 90 tablet 3    amLODIPine (NORVASC) 10 MG tablet Take 1 tablet by mouth daily 90 tablet 3    traZODone (DESYREL) 50 MG tablet Take 2 tablets by mouth nightly Take 2 tab at bedtime 180 tablet 3    diclofenac (VOLTAREN) 75 MG EC tablet Take 1 tablet by mouth 2 times daily 60 tablet 1    Lidocaine (ZTLIDO) 1.8 % PTCH Apply topically every 12 hours      Cholecalciferol (VITAMIN D) 50 MCG (2000 UT) CAPS capsule Take by mouth      loratadine (CLARITIN) 10 MG capsule Take 1 capsule by mouth daily      mesalamine (LIALDA) 1.2 g EC tablet TAKE 2 TABLETS BY MOUTH EVERY DAY WITH A MEAL      pregabalin (LYRICA) 75 MG capsule Take 1 capsule by mouth 3 times daily. No current facility-administered medications for this visit.      Allergies   Allergen Reactions    Codeine Other (See Comments)     Makes her \"wired\" hyper    Penicillins Rash    Morphine Other (See Comments)     Grandmother had brain stem stroke with this    Adhesive Tape Rash     Patient Active Problem List   Diagnosis    Mild depression    KELSEA (obstructive sleep apnea)

## 2023-11-04 ENCOUNTER — HOSPITAL ENCOUNTER (OUTPATIENT)
Dept: MAMMOGRAPHY | Age: 53
End: 2023-11-04
Attending: FAMILY MEDICINE
Payer: COMMERCIAL

## 2023-11-04 DIAGNOSIS — Z12.31 VISIT FOR SCREENING MAMMOGRAM: ICD-10-CM

## 2023-11-04 PROCEDURE — 77063 BREAST TOMOSYNTHESIS BI: CPT

## 2023-11-16 ENCOUNTER — HOSPITAL ENCOUNTER (OUTPATIENT)
Dept: MAMMOGRAPHY | Age: 53
End: 2023-11-16
Attending: FAMILY MEDICINE
Payer: COMMERCIAL

## 2023-11-16 ENCOUNTER — HOSPITAL ENCOUNTER (OUTPATIENT)
Dept: MAMMOGRAPHY | Age: 53
Discharge: HOME | End: 2023-11-16
Attending: FAMILY MEDICINE
Payer: COMMERCIAL

## 2023-11-16 DIAGNOSIS — R92.8 ABNORMAL SCREENING MAMMOGRAM: ICD-10-CM

## 2023-11-16 PROCEDURE — G0279 TOMOSYNTHESIS, MAMMO: HCPCS

## 2023-11-30 DIAGNOSIS — F41.9 ANXIETY AND DEPRESSION: ICD-10-CM

## 2023-11-30 DIAGNOSIS — F32.A ANXIETY AND DEPRESSION: ICD-10-CM

## 2023-11-30 RX ORDER — BUPROPION HYDROCHLORIDE 150 MG/1
150 TABLET ORAL EVERY MORNING
Qty: 30 TABLET | Refills: 3 | OUTPATIENT
Start: 2023-11-30

## 2023-12-06 ENCOUNTER — HOSPITAL ENCOUNTER (OUTPATIENT)
Dept: MAMMOGRAPHY | Age: 53
Discharge: HOME | End: 2023-12-09
Payer: COMMERCIAL

## 2023-12-06 DIAGNOSIS — R92.8 ABNORMAL FINDING ON MAMMOGRAPHY: ICD-10-CM

## 2023-12-06 PROCEDURE — 2500000003 HC RX 250 WO HCPCS: Performed by: FAMILY MEDICINE

## 2023-12-06 PROCEDURE — 77065 DX MAMMO INCL CAD UNI: CPT

## 2023-12-06 PROCEDURE — 88305 TISSUE EXAM BY PATHOLOGIST: CPT

## 2023-12-06 PROCEDURE — 19081 BX BREAST 1ST LESION STRTCTC: CPT

## 2023-12-06 PROCEDURE — A4217 STERILE WATER/SALINE, 500 ML: HCPCS | Performed by: FAMILY MEDICINE

## 2023-12-06 PROCEDURE — 2580000003 HC RX 258: Performed by: FAMILY MEDICINE

## 2023-12-06 PROCEDURE — 76098 X-RAY EXAM SURGICAL SPECIMEN: CPT

## 2023-12-06 RX ORDER — MAGNESIUM HYDROXIDE 1200 MG/15ML
250 LIQUID ORAL ONCE
Status: COMPLETED | OUTPATIENT
Start: 2023-12-06 | End: 2023-12-06

## 2023-12-06 RX ORDER — LIDOCAINE HYDROCHLORIDE AND EPINEPHRINE 10; 10 MG/ML; UG/ML
20 INJECTION, SOLUTION INFILTRATION; PERINEURAL ONCE
Status: COMPLETED | OUTPATIENT
Start: 2023-12-06 | End: 2023-12-06

## 2023-12-06 RX ORDER — LIDOCAINE HYDROCHLORIDE 10 MG/ML
5 INJECTION, SOLUTION INFILTRATION; PERINEURAL ONCE
Status: COMPLETED | OUTPATIENT
Start: 2023-12-06 | End: 2023-12-06

## 2023-12-06 RX ADMIN — SODIUM CHLORIDE 250 ML: 900 IRRIGANT IRRIGATION at 10:46

## 2023-12-06 RX ADMIN — LIDOCAINE HYDROCHLORIDE 5 ML: 10 INJECTION, SOLUTION INFILTRATION; PERINEURAL at 10:46

## 2023-12-06 RX ADMIN — LIDOCAINE HYDROCHLORIDE,EPINEPHRINE BITARTRATE 15 ML: 10; .01 INJECTION, SOLUTION INFILTRATION; PERINEURAL at 10:45

## 2023-12-06 ASSESSMENT — PAIN SCALES - GENERAL
PAINLEVEL_OUTOF10: 6
PAINLEVEL_OUTOF10: 6

## 2023-12-07 ENCOUNTER — TELEPHONE (OUTPATIENT)
Dept: MAMMOGRAPHY | Age: 53
End: 2023-12-07

## 2023-12-15 ENCOUNTER — PREP FOR PROCEDURE (OUTPATIENT)
Dept: SURGERY | Age: 53
End: 2023-12-15

## 2023-12-15 ENCOUNTER — OFFICE VISIT (OUTPATIENT)
Dept: SURGERY | Age: 53
End: 2023-12-15
Payer: COMMERCIAL

## 2023-12-15 VITALS
BODY MASS INDEX: 31.34 KG/M2 | HEIGHT: 61 IN | WEIGHT: 166 LBS | HEART RATE: 90 BPM | SYSTOLIC BLOOD PRESSURE: 134 MMHG | DIASTOLIC BLOOD PRESSURE: 90 MMHG

## 2023-12-15 DIAGNOSIS — D05.01 NEOPLASM OF RIGHT BREAST, PRIMARY TUMOR STAGING CATEGORY TIS: LOBULAR CARCINOMA IN SITU (LCIS): Primary | ICD-10-CM

## 2023-12-15 DIAGNOSIS — R92.8 ABNORMAL MAMMOGRAM OF RIGHT BREAST: ICD-10-CM

## 2023-12-15 PROCEDURE — 3075F SYST BP GE 130 - 139MM HG: CPT | Performed by: SURGERY

## 2023-12-15 PROCEDURE — 99204 OFFICE O/P NEW MOD 45 MIN: CPT | Performed by: SURGERY

## 2023-12-15 PROCEDURE — 3080F DIAST BP >= 90 MM HG: CPT | Performed by: SURGERY

## 2023-12-15 NOTE — PROGRESS NOTES
12/15/2023    Ana Schwarz  MRN: [id number]      CHIEF COMPLAINT: Right breast lobular carcinoma in situ      PRIMARY CARE PHYSICIAN: Shobha Mitchell DO      HISTORY:  History of right breast atypical ductal hyperplasia. Had excision in 2018. Underwent screening breast imaging and architectural distortion with new calcifications were noted in the right breast.  Biopsy showed lobular carcinoma in situ. She denies palpable breast masses, nipple discharge, or breast pain. No family history of breast cancer. REVIEW OF SYSTEMS:  Review of Systems   Constitutional: Negative. HENT: Negative. Eyes: Negative. Respiratory: Negative. Cardiovascular: Negative. Gastrointestinal: Negative. Endocrine: Negative. Genitourinary: Negative. Musculoskeletal: Negative. Skin: Negative. Allergic/Immunologic: Negative. Neurological: Negative. Hematological: Negative. Psychiatric/Behavioral: Negative.           Past Medical History:   Diagnosis Date    Atypical ductal hyperplasia of right breast     per pt benign    Chronic pain     R knee    Colitis     Depression     HTN (hypertension)     managed with med    Sleep apnea     per pt is in the process of getting a new CPAP    Ulcerative colitis (HCC)        Current Outpatient Medications   Medication Sig Dispense Refill    buPROPion (WELLBUTRIN XL) 150 MG extended release tablet Take 1 tablet by mouth every morning 30 tablet 3    mirabegron (MYRBETRIQ) 50 MG TB24 Take 50 mg by mouth daily 90 tablet 1    albuterol sulfate HFA (PROVENTIL;VENTOLIN;PROAIR) 108 (90 Base) MCG/ACT inhaler TAKE 2 PUFFS BY MOUTH EVERY 4 HOURS 18 g 5    valsartan (DIOVAN) 80 MG tablet Take 1 tablet by mouth daily 90 tablet 3    traZODone (DESYREL) 50 MG tablet Take 2 tablets by mouth nightly Take 2 tab at bedtime 180 tablet 3    Cholecalciferol (VITAMIN D) 50 MCG (2000 UT) CAPS capsule Take by mouth      loratadine (CLARITIN) 10 MG capsule Take 1

## 2024-01-23 NOTE — PERIOP NOTE
Patient verified name and .  Order for consent was found in EHR and matches case posting; patient verifies procedure.   Wire localized right breast biopsy   Type 1b surgery, PAT phone assessment complete.  Orders were received.  Labs per surgeon: None  Labs per anesthesia protocol: None    Patient answered medical/surgical history questions at their best of ability. All prior to admission medications documented in EPIC.  Patient instructed to take the following medications the day of surgery according to anesthesia guidelines with a small sip of water: Bring Albuterol inhaler to hospital, Myrbetriq, Pregabalin,  Hold all vitamins 7 days prior to surgery and NSAIDS 5 days prior to surgery. Prescription meds to hold:None  Patient instructed on the following:    > Arrive at A Entrance, time of arrival to be called the day before by 1700  > NPO after midnight, unless otherwise indicated, including gum, mints, and ice chips  > Responsible adult must drive patient to the hospital, stay during surgery, and patient will need supervision 24 hours after anesthesia  > Use non moisturizing soap in shower the night before surgery and on the morning of surgery  > All piercings must be removed prior to arrival.    > Leave all valuables (money and jewelry) at home but bring insurance card and ID on DOS.     > Do not wear make-up, nail polish, lotions, cologne, perfumes, powders, or oil on skin. Artificial nails are not permitted.

## 2024-01-25 ENCOUNTER — ANESTHESIA EVENT (OUTPATIENT)
Dept: SURGERY | Age: 54
End: 2024-01-25
Payer: COMMERCIAL

## 2024-01-26 ENCOUNTER — APPOINTMENT (OUTPATIENT)
Dept: MAMMOGRAPHY | Age: 54
End: 2024-01-26
Attending: SURGERY
Payer: COMMERCIAL

## 2024-01-26 ENCOUNTER — ANESTHESIA (OUTPATIENT)
Dept: SURGERY | Age: 54
End: 2024-01-26
Payer: COMMERCIAL

## 2024-01-26 ENCOUNTER — HOSPITAL ENCOUNTER (OUTPATIENT)
Age: 54
Setting detail: OUTPATIENT SURGERY
Discharge: HOME | End: 2024-01-26
Attending: SURGERY | Admitting: SURGERY
Payer: COMMERCIAL

## 2024-01-26 VITALS
HEART RATE: 76 BPM | TEMPERATURE: 97.5 F | OXYGEN SATURATION: 96 % | SYSTOLIC BLOOD PRESSURE: 139 MMHG | RESPIRATION RATE: 16 BRPM | HEIGHT: 61 IN | WEIGHT: 173.8 LBS | DIASTOLIC BLOOD PRESSURE: 85 MMHG | BODY MASS INDEX: 32.81 KG/M2

## 2024-01-26 DIAGNOSIS — Z86.000 HISTORY OF LOBULAR CARCINOMA IN SITU (LCIS) OF BREAST: ICD-10-CM

## 2024-01-26 DIAGNOSIS — D05.01 NEOPLASM OF RIGHT BREAST, PRIMARY TUMOR STAGING CATEGORY TIS: LOBULAR CARCINOMA IN SITU (LCIS): ICD-10-CM

## 2024-01-26 DIAGNOSIS — R92.8 ABNORMAL MAMMOGRAM OF RIGHT BREAST: ICD-10-CM

## 2024-01-26 PROCEDURE — 7100000000 HC PACU RECOVERY - FIRST 15 MIN: Performed by: SURGERY

## 2024-01-26 PROCEDURE — 3600000013 HC SURGERY LEVEL 3 ADDTL 15MIN: Performed by: SURGERY

## 2024-01-26 PROCEDURE — 6360000002 HC RX W HCPCS: Performed by: SURGERY

## 2024-01-26 PROCEDURE — 88305 TISSUE EXAM BY PATHOLOGIST: CPT

## 2024-01-26 PROCEDURE — 19126 EXCISION ADDL BREAST LESION: CPT | Performed by: SURGERY

## 2024-01-26 PROCEDURE — 2709999900 HC NON-CHARGEABLE SUPPLY: Performed by: SURGERY

## 2024-01-26 PROCEDURE — 7100000010 HC PHASE II RECOVERY - FIRST 15 MIN: Performed by: SURGERY

## 2024-01-26 PROCEDURE — 76098 X-RAY EXAM SURGICAL SPECIMEN: CPT

## 2024-01-26 PROCEDURE — 3700000000 HC ANESTHESIA ATTENDED CARE: Performed by: SURGERY

## 2024-01-26 PROCEDURE — 6360000002 HC RX W HCPCS: Performed by: NURSE ANESTHETIST, CERTIFIED REGISTERED

## 2024-01-26 PROCEDURE — 88307 TISSUE EXAM BY PATHOLOGIST: CPT

## 2024-01-26 PROCEDURE — 2580000003 HC RX 258: Performed by: NURSE ANESTHETIST, CERTIFIED REGISTERED

## 2024-01-26 PROCEDURE — 2500000003 HC RX 250 WO HCPCS: Performed by: ANESTHESIOLOGY

## 2024-01-26 PROCEDURE — 7100000011 HC PHASE II RECOVERY - ADDTL 15 MIN: Performed by: SURGERY

## 2024-01-26 PROCEDURE — 19125 EXCISION BREAST LESION: CPT | Performed by: SURGERY

## 2024-01-26 PROCEDURE — 6370000000 HC RX 637 (ALT 250 FOR IP): Performed by: ANESTHESIOLOGY

## 2024-01-26 PROCEDURE — 2500000003 HC RX 250 WO HCPCS: Performed by: SURGERY

## 2024-01-26 PROCEDURE — 2500000003 HC RX 250 WO HCPCS: Performed by: NURSE ANESTHETIST, CERTIFIED REGISTERED

## 2024-01-26 PROCEDURE — 3600000003 HC SURGERY LEVEL 3 BASE: Performed by: SURGERY

## 2024-01-26 PROCEDURE — 3700000001 HC ADD 15 MINUTES (ANESTHESIA): Performed by: SURGERY

## 2024-01-26 PROCEDURE — 2580000003 HC RX 258: Performed by: ANESTHESIOLOGY

## 2024-01-26 PROCEDURE — 6360000002 HC RX W HCPCS: Performed by: ANESTHESIOLOGY

## 2024-01-26 PROCEDURE — C1819 TISSUE LOCALIZATION-EXCISION: HCPCS

## 2024-01-26 PROCEDURE — 7100000001 HC PACU RECOVERY - ADDTL 15 MIN: Performed by: SURGERY

## 2024-01-26 RX ORDER — LIDOCAINE HYDROCHLORIDE 20 MG/ML
INJECTION, SOLUTION EPIDURAL; INFILTRATION; INTRACAUDAL; PERINEURAL PRN
Status: DISCONTINUED | OUTPATIENT
Start: 2024-01-26 | End: 2024-01-26 | Stop reason: SDUPTHER

## 2024-01-26 RX ORDER — SODIUM CHLORIDE 0.9 % (FLUSH) 0.9 %
5-40 SYRINGE (ML) INJECTION EVERY 12 HOURS SCHEDULED
Status: DISCONTINUED | OUTPATIENT
Start: 2024-01-26 | End: 2024-01-26 | Stop reason: HOSPADM

## 2024-01-26 RX ORDER — DIPHENHYDRAMINE HYDROCHLORIDE 50 MG/ML
12.5 INJECTION INTRAMUSCULAR; INTRAVENOUS
Status: DISCONTINUED | OUTPATIENT
Start: 2024-01-26 | End: 2024-01-26 | Stop reason: HOSPADM

## 2024-01-26 RX ORDER — PROCHLORPERAZINE EDISYLATE 5 MG/ML
5 INJECTION INTRAMUSCULAR; INTRAVENOUS
Status: DISCONTINUED | OUTPATIENT
Start: 2024-01-26 | End: 2024-01-26 | Stop reason: HOSPADM

## 2024-01-26 RX ORDER — BUPIVACAINE HYDROCHLORIDE 5 MG/ML
INJECTION, SOLUTION EPIDURAL; INTRACAUDAL PRN
Status: DISCONTINUED | OUTPATIENT
Start: 2024-01-26 | End: 2024-01-26 | Stop reason: ALTCHOICE

## 2024-01-26 RX ORDER — TRAMADOL HYDROCHLORIDE 50 MG/1
50 TABLET ORAL EVERY 6 HOURS PRN
Qty: 12 TABLET | Refills: 0 | Status: SHIPPED | OUTPATIENT
Start: 2024-01-26 | End: 2024-01-29

## 2024-01-26 RX ORDER — SODIUM CHLORIDE, SODIUM LACTATE, POTASSIUM CHLORIDE, CALCIUM CHLORIDE 600; 310; 30; 20 MG/100ML; MG/100ML; MG/100ML; MG/100ML
INJECTION, SOLUTION INTRAVENOUS CONTINUOUS PRN
Status: DISCONTINUED | OUTPATIENT
Start: 2024-01-26 | End: 2024-01-26 | Stop reason: SDUPTHER

## 2024-01-26 RX ORDER — OXYCODONE HYDROCHLORIDE 5 MG/1
10 TABLET ORAL PRN
Status: DISCONTINUED | OUTPATIENT
Start: 2024-01-26 | End: 2024-01-26 | Stop reason: HOSPADM

## 2024-01-26 RX ORDER — SODIUM CHLORIDE, SODIUM LACTATE, POTASSIUM CHLORIDE, CALCIUM CHLORIDE 600; 310; 30; 20 MG/100ML; MG/100ML; MG/100ML; MG/100ML
INJECTION, SOLUTION INTRAVENOUS CONTINUOUS
Status: DISCONTINUED | OUTPATIENT
Start: 2024-01-26 | End: 2024-01-26 | Stop reason: HOSPADM

## 2024-01-26 RX ORDER — OXYCODONE HYDROCHLORIDE 5 MG/1
5 TABLET ORAL PRN
Status: DISCONTINUED | OUTPATIENT
Start: 2024-01-26 | End: 2024-01-26 | Stop reason: HOSPADM

## 2024-01-26 RX ORDER — ONDANSETRON 2 MG/ML
4 INJECTION INTRAMUSCULAR; INTRAVENOUS
Status: COMPLETED | OUTPATIENT
Start: 2024-01-26 | End: 2024-01-26

## 2024-01-26 RX ORDER — DEXAMETHASONE SODIUM PHOSPHATE 10 MG/ML
INJECTION INTRAMUSCULAR; INTRAVENOUS PRN
Status: DISCONTINUED | OUTPATIENT
Start: 2024-01-26 | End: 2024-01-26 | Stop reason: SDUPTHER

## 2024-01-26 RX ORDER — LIDOCAINE HYDROCHLORIDE 10 MG/ML
5 INJECTION, SOLUTION INFILTRATION; PERINEURAL ONCE
Status: COMPLETED | OUTPATIENT
Start: 2024-01-26 | End: 2024-01-26

## 2024-01-26 RX ORDER — SODIUM CHLORIDE 9 MG/ML
INJECTION, SOLUTION INTRAVENOUS PRN
Status: DISCONTINUED | OUTPATIENT
Start: 2024-01-26 | End: 2024-01-26

## 2024-01-26 RX ORDER — SODIUM CHLORIDE 0.9 % (FLUSH) 0.9 %
5-40 SYRINGE (ML) INJECTION PRN
Status: DISCONTINUED | OUTPATIENT
Start: 2024-01-26 | End: 2024-01-26 | Stop reason: HOSPADM

## 2024-01-26 RX ORDER — LIDOCAINE HYDROCHLORIDE 10 MG/ML
5 INJECTION, SOLUTION INFILTRATION; PERINEURAL ONCE
Status: CANCELLED | OUTPATIENT
Start: 2024-01-26 | End: 2024-01-26

## 2024-01-26 RX ORDER — LIDOCAINE HYDROCHLORIDE 10 MG/ML
1 INJECTION, SOLUTION INFILTRATION; PERINEURAL
Status: COMPLETED | OUTPATIENT
Start: 2024-01-26 | End: 2024-01-26

## 2024-01-26 RX ORDER — KETOROLAC TROMETHAMINE 30 MG/ML
INJECTION, SOLUTION INTRAMUSCULAR; INTRAVENOUS PRN
Status: DISCONTINUED | OUTPATIENT
Start: 2024-01-26 | End: 2024-01-26 | Stop reason: SDUPTHER

## 2024-01-26 RX ORDER — PROPOFOL 10 MG/ML
INJECTION, EMULSION INTRAVENOUS PRN
Status: DISCONTINUED | OUTPATIENT
Start: 2024-01-26 | End: 2024-01-26 | Stop reason: SDUPTHER

## 2024-01-26 RX ORDER — MIDAZOLAM HYDROCHLORIDE 1 MG/ML
INJECTION INTRAMUSCULAR; INTRAVENOUS PRN
Status: DISCONTINUED | OUTPATIENT
Start: 2024-01-26 | End: 2024-01-26 | Stop reason: SDUPTHER

## 2024-01-26 RX ORDER — HYDROMORPHONE HYDROCHLORIDE 1 MG/ML
0.5 INJECTION, SOLUTION INTRAMUSCULAR; INTRAVENOUS; SUBCUTANEOUS EVERY 5 MIN PRN
Status: DISCONTINUED | OUTPATIENT
Start: 2024-01-26 | End: 2024-01-26 | Stop reason: HOSPADM

## 2024-01-26 RX ORDER — ACETAMINOPHEN 500 MG
1000 TABLET ORAL ONCE
Status: COMPLETED | OUTPATIENT
Start: 2024-01-26 | End: 2024-01-26

## 2024-01-26 RX ORDER — SODIUM CHLORIDE 9 MG/ML
INJECTION, SOLUTION INTRAVENOUS PRN
Status: DISCONTINUED | OUTPATIENT
Start: 2024-01-26 | End: 2024-01-26 | Stop reason: HOSPADM

## 2024-01-26 RX ORDER — ONDANSETRON 2 MG/ML
INJECTION INTRAMUSCULAR; INTRAVENOUS PRN
Status: DISCONTINUED | OUTPATIENT
Start: 2024-01-26 | End: 2024-01-26 | Stop reason: SDUPTHER

## 2024-01-26 RX ORDER — MIDAZOLAM HYDROCHLORIDE 2 MG/2ML
2 INJECTION, SOLUTION INTRAMUSCULAR; INTRAVENOUS
Status: DISCONTINUED | OUTPATIENT
Start: 2024-01-26 | End: 2024-01-26 | Stop reason: HOSPADM

## 2024-01-26 RX ORDER — FENTANYL CITRATE 50 UG/ML
INJECTION, SOLUTION INTRAMUSCULAR; INTRAVENOUS PRN
Status: DISCONTINUED | OUTPATIENT
Start: 2024-01-26 | End: 2024-01-26 | Stop reason: SDUPTHER

## 2024-01-26 RX ADMIN — LIDOCAINE HYDROCHLORIDE 60 MG: 20 INJECTION, SOLUTION EPIDURAL; INFILTRATION; INTRACAUDAL; PERINEURAL at 10:44

## 2024-01-26 RX ADMIN — KETOROLAC TROMETHAMINE 30 MG: 30 INJECTION, SOLUTION INTRAMUSCULAR at 11:20

## 2024-01-26 RX ADMIN — DEXAMETHASONE SODIUM PHOSPHATE 10 MG: 10 INJECTION INTRAMUSCULAR; INTRAVENOUS at 10:47

## 2024-01-26 RX ADMIN — SODIUM CHLORIDE, POTASSIUM CHLORIDE, SODIUM LACTATE AND CALCIUM CHLORIDE: 600; 310; 30; 20 INJECTION, SOLUTION INTRAVENOUS at 08:16

## 2024-01-26 RX ADMIN — SODIUM CHLORIDE, SODIUM LACTATE, POTASSIUM CHLORIDE, AND CALCIUM CHLORIDE: 600; 310; 30; 20 INJECTION, SOLUTION INTRAVENOUS at 10:36

## 2024-01-26 RX ADMIN — SODIUM CHLORIDE, SODIUM LACTATE, POTASSIUM CHLORIDE, AND CALCIUM CHLORIDE: 600; 310; 30; 20 INJECTION, SOLUTION INTRAVENOUS at 11:21

## 2024-01-26 RX ADMIN — PHENYLEPHRINE HYDROCHLORIDE 100 MCG: 0.1 INJECTION, SOLUTION INTRAVENOUS at 10:50

## 2024-01-26 RX ADMIN — ONDANSETRON 4 MG: 2 INJECTION INTRAMUSCULAR; INTRAVENOUS at 11:44

## 2024-01-26 RX ADMIN — LIDOCAINE HYDROCHLORIDE 1 ML: 10 INJECTION, SOLUTION INFILTRATION; PERINEURAL at 08:17

## 2024-01-26 RX ADMIN — ACETAMINOPHEN 1000 MG: 500 TABLET, FILM COATED ORAL at 08:03

## 2024-01-26 RX ADMIN — MIDAZOLAM 2 MG: 1 INJECTION INTRAMUSCULAR; INTRAVENOUS at 10:37

## 2024-01-26 RX ADMIN — FENTANYL CITRATE 100 MCG: 50 INJECTION, SOLUTION INTRAMUSCULAR; INTRAVENOUS at 10:37

## 2024-01-26 RX ADMIN — ONDANSETRON 4 MG: 2 INJECTION INTRAMUSCULAR; INTRAVENOUS at 10:47

## 2024-01-26 RX ADMIN — PROPOFOL 200 MG: 10 INJECTION, EMULSION INTRAVENOUS at 10:44

## 2024-01-26 RX ADMIN — LIDOCAINE HYDROCHLORIDE 3 ML: 10 INJECTION, SOLUTION INFILTRATION; PERINEURAL at 09:38

## 2024-01-26 RX ADMIN — PHENYLEPHRINE HYDROCHLORIDE 100 MCG: 0.1 INJECTION, SOLUTION INTRAVENOUS at 11:13

## 2024-01-26 RX ADMIN — Medication 2 G: at 10:36

## 2024-01-26 ASSESSMENT — PAIN - FUNCTIONAL ASSESSMENT
PAIN_FUNCTIONAL_ASSESSMENT: 0-10
PAIN_FUNCTIONAL_ASSESSMENT: 0-10

## 2024-01-26 NOTE — DISCHARGE INSTRUCTIONS
make important personal or business decisions  Do not drink alcoholic beverages  If you have not urinated within 8 hours after discharge, and you are experiencing discomfort from urinary retention, please go to the nearest ED.  If you have sleep apnea and have a CPAP machine, please use it for all naps and sleeping.  Please use caution when taking narcotics and any of your home medications that may cause drowsiness.  *  Please give a list of your current medications to your Primary Care Provider.  *  Please update this list whenever your medications are discontinued, doses are      changed, or new medications (including over-the-counter products) are added.  *  Please carry medication information at all times in case of emergency situations.    These are general instructions for a healthy lifestyle:  No smoking/ No tobacco products/ Avoid exposure to second hand smoke  Surgeon General's Warning:  Quitting smoking now greatly reduces serious risk to your health.  Obesity, smoking, and sedentary lifestyle greatly increases your risk for illness  A healthy diet, regular physical exercise & weight monitoring are important for maintaining a healthy lifestyle    You may be retaining fluid if you have a history of heart failure or if you experience any of the following symptoms:  Weight gain of 3 pounds or more overnight or 5 pounds in a week, increased swelling in our hands or feet or shortness of breath while lying flat in bed.  Please call your doctor as soon as you notice any of these symptoms; do not wait until your next office visit.

## 2024-01-26 NOTE — ANESTHESIA PRE PROCEDURE
Department of Anesthesiology  Preprocedure Note       Name:  Allison Luke   Age:  53 y.o.  :  1970                                          MRN:  [id number]         Date:  2024      Surgeon: Surgeon(s):  Roderick Juárez Jr., MD    Procedure: Procedure(s):  WIRE LOCALIZED RIGHT BREAST MASS EXCISION PreOp:        7:00 am  Loc:            8:30 am  OR:            10:45 am    Medications prior to admission:   Prior to Admission medications    Medication Sig Start Date End Date Taking? Authorizing Provider   traMADol (ULTRAM) 50 MG tablet Take 1 tablet by mouth every 6 hours as needed for Pain for up to 3 days. Intended supply: 3 days. Take lowest dose possible to manage pain Max Daily Amount: 200 mg 24 Yes Roderick Juárez Jr., MD   mirabegron (MYRBETRIQ) 50 MG TB24 Take 50 mg by mouth daily 23   Berto Daigle,    albuterol sulfate HFA (PROVENTIL;VENTOLIN;PROAIR) 108 (90 Base) MCG/ACT inhaler TAKE 2 PUFFS BY MOUTH EVERY 4 HOURS 4/10/23   Berto Daigle,    valsartan (DIOVAN) 80 MG tablet Take 1 tablet by mouth daily 4/10/23   Berto Daigle,    traZODone (DESYREL) 50 MG tablet Take 2 tablets by mouth nightly Take 2 tab at bedtime 4/10/23   Berto Daigle DO   loratadine (CLARITIN) 10 MG capsule Take 1 capsule by mouth daily    Provider, MD Heather   pregabalin (LYRICA) 75 MG capsule Take 1 capsule by mouth daily. 21   Automatic Reconciliation, Ar       Current medications:    Current Facility-Administered Medications   Medication Dose Route Frequency Provider Last Rate Last Admin   • sodium chloride flush 0.9 % injection 5-40 mL  5-40 mL IntraVENous 2 times per day Roderick Juárez Jr., MD       • sodium chloride flush 0.9 % injection 5-40 mL  5-40 mL IntraVENous PRN Roderick Juárez Jr., MD       • 0.9 % sodium chloride infusion   IntraVENous PRN Roderick Juárez Jr., MD       • ceFAZolin (ANCEF) 2000 mg in sterile water 20 mL IV syringe  2,000 mg

## 2024-01-26 NOTE — OP NOTE
Operative Note      Patient: Allison Luke  YOB: 1970  MRN: [id number]    Date of Procedure: 1/26/2024    Pre-Op Diagnosis Codes:     * Abnormal mammogram of right breast [R92.8]    Post-Op Diagnosis: Right breast calcifications and right breast skin lesion.       Procedure(s):  WIRE LOCALIZED RIGHT BREAST MASS EXCISION PreOp:        7:00 am  Loc:            8:30 am  OR:            10:45 am    Surgeon(s):  Roderick Juárez Jr., MD    Assistant:   First Assistant: Rena Larson    Anesthesia: General    Estimated Blood Loss (mL): 10 mL    Complications: None    Specimens:   ID Type Source Tests Collected by Time Destination   A : right breast lumpectomy Tissue Breast SURGICAL PATHOLOGY Roderick Juárez Jr., MD 1/26/2024 1100    B : right breast skin lesion Tissue Breast SURGICAL PATHOLOGY Roderick Juárez Jr., MD 1/26/2024 1110        Implants:  * No implants in log *      Drains: * No LDAs found *    Findings: Right breast calcifications, right breast skin lesion.      Detailed Description of Procedure:   Patient underwent informed consent with reviewed the associated risks.  She underwent wire localization of right breast calcifications on the day of surgery.  Of note the biopsy clip had migrated 5+ centimeters from the biopsy site so the clip was not included in the localization.  She also had a skin lesion on the medial right breast that she requested excision of and this was marked in the holding area at the time of preoperative interview.  She was taken to the operating room and positioned appropriately on the operating table.  She underwent general anesthesia without complications.  The right breast was prepped and draped in a sterile fashion.  The appropriate timeout was performed.  Ancef was given as prophylaxis prior to incision.  Procedure was begun by first identifying the wire entering the lateral right breast.  A site for incision was marked in the lateral right breast and  the area infiltrated with half percent Marcaine.  A skin incision was made along the ailin with a scalpel and dissection carried into the deeper tissues using cautery and then carried out until the wire was encountered.  It was carefully swept into the operative field through the skin taking care to avoid dislodging it.  The transition portion of the wire was identified and a biopsy was carried out from the transition portion of the wire past the tip of the wire.  The specimen was marked for pathologic orientation with a stitch anterior and a double stitch medial.  Specimen was placed in the Faxitron which revealed the wire and the appropriate calcifications within the specimen.  Again the biopsy clip was not included at this was not associated with the biopsy site.  Hemostasis was achieved at the site and the site was closed with 3-0 Vicryl sutures in the deep tissues followed by 3-0 Vicryl dermal sutures and a 4-0 Monocryl subcuticular stitch with Dermabond.  Local anesthetic had been infiltrated into the deeper tissues prior to closure.  Attention was turned to the medial right breast skin lesion.  It measured approximately 9 mm.  A site for excision was marked at the base of the mass and a slight elliptical fashion.  The area was infiltrated with half percent Marcaine and a skin incision made along the ailin with a scalpel and the dissection carried down into the deeper subcutaneous fat using sharp dissection.  The entire mass was removed and sent to pathology.  Hemostasis was achieved at the site.  The site was closed with 3-0 Vicryl sutures in the dermis followed by 4 Monocryl subcuticular stitch with Dermabond.  She tolerated the procedure well without complications.  Lap and instrument count at the completion of the procedure was correct x 2.  Estimated blood loss was 10 mL or less.  Patient condition at the completion of the procedure was stable and she was awoken from anesthesia then transported to recovery

## 2024-01-26 NOTE — ANESTHESIA POSTPROCEDURE EVALUATION
Department of Anesthesiology  Postprocedure Note    Patient: Allison Luke  MRN: [id number]  YOB: 1970  Date of evaluation: 1/26/2024    Procedure Summary       Date: 01/26/24 Room / Location: [facility] MAIN OR 06 / [facility] MAIN OR    Anesthesia Start: 1036 Anesthesia Stop: 1132    Procedure: WIRE LOCALIZED RIGHT BREAST MASS EXCISION PreOp:        7:00 am  Loc:            8:30 am  OR:            10:45 am (Right) Diagnosis:       Abnormal mammogram of right breast      (Abnormal mammogram of right breast [R92.8])    Surgeons: Roderick Juárez Jr., MD Responsible Provider: Constantine Randolph MD    Anesthesia Type: General ASA Status: 2            Anesthesia Type: General    Mojgan Phase I: Mojgan Score: 7    Mojgan Phase II:      Anesthesia Post Evaluation    Patient location during evaluation: PACU  Patient participation: complete - patient participated  Level of consciousness: awake and alert  Airway patency: patent  Nausea & Vomiting: no nausea and no vomiting  Cardiovascular status: hemodynamically stable  Respiratory status: acceptable, nonlabored ventilation and spontaneous ventilation  Hydration status: euvolemic  Comments: /74   Pulse (!) 107   Temp 97.8 °F (36.6 °C) (Temporal)   Resp 12   Ht 1.549 m (5' 1\")   Wt 78.8 kg (173 lb 12.8 oz)   SpO2 97%   BMI 32.84 kg/m²     Multimodal analgesia pain management approach  Pain management: adequate and satisfactory to patient    No notable events documented.

## 2024-01-26 NOTE — BRIEF OP NOTE
Brief Postoperative Note      Patient: Allison Luke  YOB: 1970  MRN: [id number]    Date of Procedure: 1/26/2024    Pre-Op Diagnosis Codes:     * Abnormal mammogram of right breast [R92.8]    Post-Op Diagnosis:  Right breast calcifications, right breast skin lesion.       Procedure(s):  WIRE LOCALIZED RIGHT BREAST MASS EXCISION PreOp:        7:00 am  Loc:            8:30 am  OR:            10:45 am    Surgeon(s):  Roderick Juárez Jr., MD    Assistant:  First Assistant: Rena Larson    Anesthesia: General    Estimated Blood Loss (mL): 10ml    Complications: None    Specimens:   ID Type Source Tests Collected by Time Destination   A : right breast lumpectomy Tissue Breast SURGICAL PATHOLOGY Roderick Juárez Jr., MD 1/26/2024 1100    B : right breast skin lesion Tissue Breast SURGICAL PATHOLOGY Roderick Juárez Jr., MD 1/26/2024 1110        Implants:  * No implants in log *      Drains: * No LDAs found *    Findings: Right breast calcifications, right breast skin lesion.      Electronically signed by Roderick Juárez Jr, MD on 1/26/2024 at 12:17 PM

## 2024-01-26 NOTE — H&P
CHIEF COMPLAINT: Right breast lobular carcinoma in situ          HISTORY:  History of right breast atypical ductal hyperplasia.  Had excision in 2018.  Underwent screening breast imaging and architectural distortion with new calcifications were noted in the right breast.  Biopsy showed lobular carcinoma in situ.  She denies palpable breast masses, nipple discharge, or breast pain.  No family history of breast cancer.     REVIEW OF SYSTEMS:  Review of Systems   Constitutional: Negative.    HENT: Negative.     Eyes: Negative.    Respiratory: Negative.     Cardiovascular: Negative.    Gastrointestinal: Negative.    Endocrine: Negative.    Genitourinary: Negative.    Musculoskeletal: Negative.    Skin: Negative.    Allergic/Immunologic: Negative.    Neurological: Negative.    Hematological: Negative.    Psychiatric/Behavioral: Negative.           Past Medical History        Past Medical History:   Diagnosis Date    Atypical ductal hyperplasia of right breast       per pt benign    Chronic pain       R knee    Colitis      Depression      HTN (hypertension)       managed with med    Sleep apnea       per pt is in the process of getting a new CPAP    Ulcerative colitis (HCC)              Current Facility-Administered Medications          Current Outpatient Medications   Medication Sig Dispense Refill    buPROPion (WELLBUTRIN XL) 150 MG extended release tablet Take 1 tablet by mouth every morning 30 tablet 3    mirabegron (MYRBETRIQ) 50 MG TB24 Take 50 mg by mouth daily 90 tablet 1    albuterol sulfate HFA (PROVENTIL;VENTOLIN;PROAIR) 108 (90 Base) MCG/ACT inhaler TAKE 2 PUFFS BY MOUTH EVERY 4 HOURS 18 g 5    valsartan (DIOVAN) 80 MG tablet Take 1 tablet by mouth daily 90 tablet 3    traZODone (DESYREL) 50 MG tablet Take 2 tablets by mouth nightly Take 2 tab at bedtime 180 tablet 3    Cholecalciferol (VITAMIN D) 50 MCG (2000 UT) CAPS capsule Take by mouth        loratadine (CLARITIN) 10 MG capsule Take 1 capsule by mouth

## 2024-01-30 DIAGNOSIS — N39.41 URGE INCONTINENCE: ICD-10-CM

## 2024-02-01 DIAGNOSIS — N39.41 URGE INCONTINENCE: ICD-10-CM

## 2024-02-01 RX ORDER — MIRABEGRON 50 MG/1
50 TABLET, FILM COATED, EXTENDED RELEASE ORAL DAILY
Qty: 90 TABLET | Refills: 1 | Status: SHIPPED | OUTPATIENT
Start: 2024-02-01

## 2024-02-08 ENCOUNTER — OFFICE VISIT (OUTPATIENT)
Dept: SURGERY | Age: 54
End: 2024-02-08

## 2024-02-08 DIAGNOSIS — Z86.000 HISTORY OF LOBULAR CARCINOMA IN SITU (LCIS) OF BREAST: Primary | ICD-10-CM

## 2024-02-08 DIAGNOSIS — D05.01 NEOPLASM OF RIGHT BREAST, PRIMARY TUMOR STAGING CATEGORY TIS: LOBULAR CARCINOMA IN SITU (LCIS): ICD-10-CM

## 2024-02-08 PROCEDURE — 99024 POSTOP FOLLOW-UP VISIT: CPT | Performed by: SURGERY

## 2024-02-08 ASSESSMENT — ENCOUNTER SYMPTOMS
GASTROINTESTINAL NEGATIVE: 1
RESPIRATORY NEGATIVE: 1
ALLERGIC/IMMUNOLOGIC NEGATIVE: 1
EYES NEGATIVE: 1

## 2024-02-08 NOTE — PROGRESS NOTES
2/8/2024    Allison Luke  MRN: [id number]      CHIEF COMPLAINT: Doing well      PRIMARY CARE PHYSICIAN: Berto Daigle DO      HISTORY:  Status post right breast excisional biopsy for an area of lobular carcinoma in situ.  Final pathology without any findings of malignancy.  She is doing well postoperatively with no specific complaints.  Date Obtained:   1/26/2024   DIAGNOSIS       A:  \"RIGHT BREAST LUMPECTOMY\":  RESIDUAL LOBULAR CARCINOMA IN SITU   13 MILLIMETER IN GREATEST DIMENSION, MARGINS UNINVOLVED, WITHIN LESS THAN   1 MILLIMETER OF THE SUPERIOR AND ANTERIOR MARGINS; FIBROCYSTIC MASTOPATHY;   ADENOSIS.       B:  \"RIGHT BREAST SKIN LESION\":  LOBULAR CAPILLARY HEMANGIOMA.     REVIEW OF SYSTEMS:  Review of Systems   Constitutional: Negative.    HENT: Negative.     Eyes: Negative.    Respiratory: Negative.     Cardiovascular: Negative.    Gastrointestinal: Negative.    Endocrine: Negative.    Genitourinary: Negative.    Musculoskeletal: Negative.    Skin: Negative.    Allergic/Immunologic: Negative.    Neurological: Negative.    Hematological: Negative.    Psychiatric/Behavioral: Negative.            Past Medical History:   Diagnosis Date    Atypical ductal hyperplasia of right breast     per pt benign    Chronic pain     R knee    Colitis     Depression     HTN (hypertension)     managed with med    Sleep apnea     per pt is in the process of getting a new CPAP    Ulcerative colitis (HCC)        Current Outpatient Medications   Medication Sig Dispense Refill    MYRBETRIQ 50 MG TB24 TAKE 50 MG BY MOUTH DAILY 90 tablet 1    albuterol sulfate HFA (PROVENTIL;VENTOLIN;PROAIR) 108 (90 Base) MCG/ACT inhaler TAKE 2 PUFFS BY MOUTH EVERY 4 HOURS 18 g 5    valsartan (DIOVAN) 80 MG tablet Take 1 tablet by mouth daily 90 tablet 3    traZODone (DESYREL) 50 MG tablet Take 2 tablets by mouth nightly Take 2 tab at bedtime 180 tablet 3    loratadine (CLARITIN) 10 MG capsule Take 1 capsule by mouth daily

## 2024-02-11 DIAGNOSIS — F51.04 PSYCHOPHYSIOLOGICAL INSOMNIA: ICD-10-CM

## 2024-02-12 RX ORDER — TRAZODONE HYDROCHLORIDE 50 MG/1
100 TABLET ORAL NIGHTLY
Qty: 180 TABLET | Refills: 0 | Status: SHIPPED | OUTPATIENT
Start: 2024-02-12

## 2024-03-05 NOTE — PROGRESS NOTES
Initial surgical consultation with Dr. Roderick Juárez  -Pt assessed with lobular carcinoma in situ of right breast   -Physical exam noted an 8 mm raised pigmented mole in the lower inner right breast  -Dr. Juárez recommended a wire localized right breast biopsy due to the small risk of an associated malignancy; pt requested excision of the right breast skin lesion to be performed during lumpectomy     1/26/24: Pt underwent wire localized right breast mass excision and excision of right breast skin lesion with pathology from the right breast mass revealing residual lobular carcinoma in situ 13 millimeter in greatest dimension, margins uninvolved, within less than 1 millimeter of the superior and anterior margins; fibrocystic mastopathy; adenosis. 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 Juárez   -Referred to [facility] for evaluation for possible risk reduction endocrine therapy    Notes from Referring Provider: None    Presented at Tumor Board: No     Other Pertinent Information: None

## 2024-03-06 ENCOUNTER — OFFICE VISIT (OUTPATIENT)
Dept: ONCOLOGY | Age: 54
End: 2024-03-06
Payer: COMMERCIAL

## 2024-03-06 VITALS
TEMPERATURE: 98.2 F | HEART RATE: 87 BPM | HEIGHT: 61 IN | OXYGEN SATURATION: 93 % | SYSTOLIC BLOOD PRESSURE: 144 MMHG | BODY MASS INDEX: 33.61 KG/M2 | RESPIRATION RATE: 16 BRPM | WEIGHT: 178 LBS | DIASTOLIC BLOOD PRESSURE: 92 MMHG

## 2024-03-06 DIAGNOSIS — Z12.39 BREAST CANCER SCREENING, HIGH RISK PATIENT: ICD-10-CM

## 2024-03-06 DIAGNOSIS — R92.30 DENSE BREAST TISSUE: ICD-10-CM

## 2024-03-06 DIAGNOSIS — Z12.31 ENCOUNTER FOR SCREENING MAMMOGRAM FOR MALIGNANT NEOPLASM OF BREAST: ICD-10-CM

## 2024-03-06 DIAGNOSIS — D05.01 LOBULAR CARCINOMA IN SITU (LCIS) OF RIGHT BREAST: Primary | ICD-10-CM

## 2024-03-06 DIAGNOSIS — N64.59 ABNORMAL BREAST EXAM: ICD-10-CM

## 2024-03-06 PROCEDURE — 3077F SYST BP >= 140 MM HG: CPT | Performed by: INTERNAL MEDICINE

## 2024-03-06 PROCEDURE — 99204 OFFICE O/P NEW MOD 45 MIN: CPT | Performed by: INTERNAL MEDICINE

## 2024-03-06 PROCEDURE — 3080F DIAST BP >= 90 MM HG: CPT | Performed by: INTERNAL MEDICINE

## 2024-03-06 ASSESSMENT — PATIENT HEALTH QUESTIONNAIRE - PHQ9
SUM OF ALL RESPONSES TO PHQ QUESTIONS 1-9: 0
SUM OF ALL RESPONSES TO PHQ QUESTIONS 1-9: 0
SUM OF ALL RESPONSES TO PHQ9 QUESTIONS 1 & 2: 0
SUM OF ALL RESPONSES TO PHQ QUESTIONS 1-9: 0
SUM OF ALL RESPONSES TO PHQ QUESTIONS 1-9: 0
2. FEELING DOWN, DEPRESSED OR HOPELESS: 0
1. LITTLE INTEREST OR PLEASURE IN DOING THINGS: 0

## 2024-03-06 NOTE — PATIENT INSTRUCTIONS
Patient Instructions from Today's Visit    Reason for Visit:  New Patient    Diagnosis Information:  [email or web address]-[email or web address]-[email or web address]-[email or web address]-[email or web address]    Plan:  History reviewed.  Symptoms reviewed.  Pathology discussed.  Endocrine therapy options and side effects discussed.  Tamoxifen can increase your blood pressure, increase risk of cataract formation, increase risk of uterine cancer, cause hot flashes, cause mood changes, and increase risk of blood clots.  Surveillance imaging discussed - annual mammograms and annual MRIs of the breasts alternated by 6 months.  Diagnostic mammogram and ultrasound of the left breast ordered for now.  You can call to schedule this at [phone].  MRI of breasts due in May 2024.  You can call to schedule this at [phone].  Screening mammogram due in November 2024.  You can call to schedule this at [phone].  Let us know if you are interested in starting tamoxifen.  We will move up your appointment if that is the case.  Discussed case with Dr. Hairston, radiation oncologist.  Radiation is not indicated at this time.    Follow Up:  6 months after your MRI and right after your mammogram in November.    Recent Lab Results:  N/A    Treatment Summary has been discussed and given to patient: N/A        -------------------------------------------------------------------------------------------------------------------  Please call our office at [phone] if you have any  of the following symptoms:   Fever of 100.5 or greater  Chills  Shortness of breath  Swelling or pain in one leg    After office hours an answering service is available and will contact a provider for emergencies or if you are experiencing any of the above symptoms.    Patient did express an interest in My Chart.  My Chart log in information explained on the after visit summary printout at the check-out desk.    EUGENIA OCASIO RN      [facility]

## 2024-03-06 NOTE — PROGRESS NOTES
All orders placed during this encounter were verbal orders received from Dr. Hilario, read back and verified.    
hour(s)).    Imaging: reviewed     PATHOLOGY:      1/2024        ASSESSMENT:   Diagnosis Orders   1. Lobular carcinoma in situ (LCIS) of right breast  CBC with Auto Differential    Comprehensive Metabolic Panel    VANESSA CORY DIGITAL SCREEN BILATERAL      2. Dense breast tissue  MRI BREAST BILATERAL W WO CONTRAST    VANESSA CORY DIGITAL DIAGNOSTIC UNILATERAL LEFT    US BREAST COMPLETE LEFT      3. Abnormal breast exam  VANESSA CORY DIGITAL DIAGNOSTIC UNILATERAL LEFT    US BREAST COMPLETE LEFT      4. Breast cancer screening, high risk patient  MRI BREAST BILATERAL W WO CONTRAST      5. Encounter for screening mammogram for malignant neoplasm of breast  VANESSA CORY DIGITAL SCREEN BILATERAL          Ms. Luke is here for evaluation of LCIS and chemoprevention.      2023/244 - LCIS right breast - s/p bx and wire loc lumpectomy - 13mm in size    2018 - s/p stereo bx for Ca++ in right breast 1:00 - ADH w micro Ca++, s/p lumpectomy w ADH, focal atypical lobular hyperplasia, apocrine metaplasia and microcalcifications in association with changes consistent with prior biopsy site; consulted Dr Deleon and elected to forego endo chemoprevention     - During today's visit we discussed the pathophysiology of breast cancer, staging, and the importance of receptor status in terms of treatment options.  We then reviewed her medical history as well as oncologic history, recent imaging and pathology in detail.  We discussed role of chemoprevention and risks associated with LCIS.    - since pt has hx of ADH and now LCIS, we reviewed risk reduction with endocrine therapy.  She has met Dr Deleon regarding this in the past when she was diagnosed w ADH in 2018.  At that time, she elected to forego chemoprevention.  Today, we again reviewed role of chemoprevention w Cobb or AI.  Risk of recurrence/malignancy can be reduced by chemoprevention.  We reviewed the caveats, MOA and SE of these agents in detail including endometrial hyperplasia w small risk of

## 2024-03-14 PROBLEM — R92.30 DENSE BREAST TISSUE: Status: ACTIVE | Noted: 2024-03-14

## 2024-03-14 PROBLEM — Z12.39 BREAST CANCER SCREENING, HIGH RISK PATIENT: Status: ACTIVE | Noted: 2024-03-14

## 2024-03-14 PROBLEM — D05.01 LOBULAR CARCINOMA IN SITU (LCIS) OF RIGHT BREAST: Status: ACTIVE | Noted: 2024-03-14

## 2024-03-14 ASSESSMENT — ENCOUNTER SYMPTOMS
SHORTNESS OF BREATH: 0
DIARRHEA: 0
WHEEZING: 0
ABDOMINAL DISTENTION: 0
HEMOPTYSIS: 0
CONSTIPATION: 0
CHEST TIGHTNESS: 0
BLOOD IN STOOL: 0
NAUSEA: 0
SCLERAL ICTERUS: 0
VOICE CHANGE: 0
TROUBLE SWALLOWING: 0
ABDOMINAL PAIN: 0
SORE THROAT: 0
VOMITING: 0

## 2024-04-03 ENCOUNTER — HOSPITAL ENCOUNTER (OUTPATIENT)
Dept: MAMMOGRAPHY | Age: 54
Discharge: HOME | End: 2024-04-06
Attending: INTERNAL MEDICINE
Payer: COMMERCIAL

## 2024-04-03 DIAGNOSIS — R92.30 DENSE BREAST TISSUE: ICD-10-CM

## 2024-04-03 DIAGNOSIS — N64.59 ABNORMAL BREAST EXAM: ICD-10-CM

## 2024-04-03 PROCEDURE — G0279 TOMOSYNTHESIS, MAMMO: HCPCS

## 2024-04-03 PROCEDURE — 76642 ULTRASOUND BREAST LIMITED: CPT

## 2024-04-08 DIAGNOSIS — D05.01 LOBULAR CARCINOMA IN SITU (LCIS) OF RIGHT BREAST: Primary | ICD-10-CM

## 2024-04-10 RX ORDER — TAMOXIFEN CITRATE 10 MG/1
10 TABLET ORAL DAILY
Qty: 90 TABLET | Refills: 3 | Status: SHIPPED | OUTPATIENT
Start: 2024-04-10

## 2024-04-11 ENCOUNTER — NURSE ONLY (OUTPATIENT)
Dept: FAMILY MEDICINE CLINIC | Facility: CLINIC | Age: 54
End: 2024-04-11
Payer: COMMERCIAL

## 2024-04-11 DIAGNOSIS — Z00.00 LABORATORY EXAMINATION ORDERED AS PART OF A ROUTINE GENERAL MEDICAL EXAMINATION: Primary | ICD-10-CM

## 2024-04-11 DIAGNOSIS — Z11.59 NEED FOR HEPATITIS C SCREENING TEST: ICD-10-CM

## 2024-04-11 DIAGNOSIS — E55.9 VITAMIN D DEFICIENCY: ICD-10-CM

## 2024-04-11 LAB
25(OH)D3 SERPL-MCNC: 21.9 NG/ML (ref 30–100)
ALBUMIN SERPL-MCNC: 3.8 G/DL (ref 3.5–5)
ALBUMIN/GLOB SERPL: 1.2 (ref 0.4–1.6)
ALP SERPL-CCNC: 58 U/L (ref 50–136)
ALT SERPL-CCNC: 38 U/L (ref 12–65)
ANION GAP SERPL CALC-SCNC: 2 MMOL/L (ref 2–11)
AST SERPL-CCNC: 17 U/L (ref 15–37)
BILIRUB SERPL-MCNC: 0.8 MG/DL (ref 0.2–1.1)
BILIRUBIN, URINE, POC: NEGATIVE
BLOOD URINE, POC: NEGATIVE
BUN SERPL-MCNC: 18 MG/DL (ref 6–23)
CALCIUM SERPL-MCNC: 9.4 MG/DL (ref 8.3–10.4)
CHLORIDE SERPL-SCNC: 106 MMOL/L (ref 103–113)
CHOLEST SERPL-MCNC: 167 MG/DL
CO2 SERPL-SCNC: 32 MMOL/L (ref 21–32)
CREAT SERPL-MCNC: 0.8 MG/DL (ref 0.6–1)
GLOBULIN SER CALC-MCNC: 3.3 G/DL (ref 2.8–4.5)
GLUCOSE SERPL-MCNC: 103 MG/DL (ref 65–100)
GLUCOSE URINE, POC: NEGATIVE
GRANS ABSOLUTE, POC: 4.4 K/UL
GRANULOCYTES %, POC: 58.8 %
HCV AB SER QL: NONREACTIVE
HDLC SERPL-MCNC: 68 MG/DL (ref 40–60)
HDLC SERPL: 2.5
HEMATOCRIT, POC: 47.9 %
HEMOGLOBIN, POC: 15.4 G/DL
HIV 1+2 AB+HIV1 P24 AG SERPL QL IA: NONREACTIVE
HIV 1/2 RESULT COMMENT: NORMAL
KETONES, URINE, POC: NEGATIVE
LDLC SERPL CALC-MCNC: 87.4 MG/DL
LEUKOCYTE ESTERASE, URINE, POC: NEGATIVE
LYMPHOCYTE %, POC: 31.6 %
LYMPHS ABSOLUTE, POC: 2.3 K/UL
MCH, POC: NORMAL PG (ref 40–?)
MCHC, POC: 32.2
MCV, POC: 90.6
MONOCYTE %, POC: 9.6 %
MONOCYTE, ABSOLUTE POC: 0.7 K/UL
MPV, POC: 9 FL
NITRITE, URINE, POC: NEGATIVE
PH, URINE, POC: 6.5 (ref 4.6–8)
PLATELET COUNT, POC: 257 K/UL
POTASSIUM SERPL-SCNC: 3.6 MMOL/L (ref 3.5–5.1)
PROT SERPL-MCNC: 7.1 G/DL (ref 6.3–8.2)
PROTEIN,URINE, POC: NEGATIVE
RBC, POC: 4.76 M/UL
RDW, POC: 11.9 %
SODIUM SERPL-SCNC: 140 MMOL/L (ref 136–146)
SPECIFIC GRAVITY, URINE, POC: 1.02 (ref 1–1.03)
TRIGL SERPL-MCNC: 58 MG/DL (ref 35–150)
TSH, 3RD GENERATION: 3.83 UIU/ML (ref 0.36–3.74)
URINALYSIS CLARITY, POC: CLEAR
URINALYSIS COLOR, POC: YELLOW
UROBILINOGEN, POC: NORMAL
VLDLC SERPL CALC-MCNC: 11.6 MG/DL (ref 6–23)
WBC, POC: 7.4 K/UL

## 2024-04-11 PROCEDURE — 85025 COMPLETE CBC W/AUTO DIFF WBC: CPT | Performed by: FAMILY MEDICINE

## 2024-04-11 PROCEDURE — 81003 URINALYSIS AUTO W/O SCOPE: CPT | Performed by: FAMILY MEDICINE

## 2024-04-13 PROBLEM — Z12.39 BREAST CANCER SCREENING, HIGH RISK PATIENT: Status: RESOLVED | Noted: 2024-03-14 | Resolved: 2024-04-13

## 2024-04-16 ASSESSMENT — PATIENT HEALTH QUESTIONNAIRE - PHQ9
5. POOR APPETITE OR OVEREATING: SEVERAL DAYS
SUM OF ALL RESPONSES TO PHQ QUESTIONS 1-9: 7
3. TROUBLE FALLING OR STAYING ASLEEP: MORE THAN HALF THE DAYS
1. LITTLE INTEREST OR PLEASURE IN DOING THINGS: NOT AT ALL
8. MOVING OR SPEAKING SO SLOWLY THAT OTHER PEOPLE COULD HAVE NOTICED. OR THE OPPOSITE, BEING SO FIGETY OR RESTLESS THAT YOU HAVE BEEN MOVING AROUND A LOT MORE THAN USUAL: NOT AT ALL
SUM OF ALL RESPONSES TO PHQ QUESTIONS 1-9: 7
9. THOUGHTS THAT YOU WOULD BE BETTER OFF DEAD, OR OF HURTING YOURSELF: NOT AT ALL
4. FEELING TIRED OR HAVING LITTLE ENERGY: NEARLY EVERY DAY
5. POOR APPETITE OR OVEREATING: SEVERAL DAYS
SUM OF ALL RESPONSES TO PHQ QUESTIONS 1-9: 7
SUM OF ALL RESPONSES TO PHQ9 QUESTIONS 1 & 2: 0
10. IF YOU CHECKED OFF ANY PROBLEMS, HOW DIFFICULT HAVE THESE PROBLEMS MADE IT FOR YOU TO DO YOUR WORK, TAKE CARE OF THINGS AT HOME, OR GET ALONG WITH OTHER PEOPLE: SOMEWHAT DIFFICULT
9. THOUGHTS THAT YOU WOULD BE BETTER OFF DEAD, OR OF HURTING YOURSELF: NOT AT ALL
4. FEELING TIRED OR HAVING LITTLE ENERGY: NEARLY EVERY DAY
2. FEELING DOWN, DEPRESSED OR HOPELESS: NOT AT ALL
3. TROUBLE FALLING OR STAYING ASLEEP: MORE THAN HALF THE DAYS
1. LITTLE INTEREST OR PLEASURE IN DOING THINGS: NOT AT ALL
6. FEELING BAD ABOUT YOURSELF - OR THAT YOU ARE A FAILURE OR HAVE LET YOURSELF OR YOUR FAMILY DOWN: NOT AT ALL
8. MOVING OR SPEAKING SO SLOWLY THAT OTHER PEOPLE COULD HAVE NOTICED. OR THE OPPOSITE - BEING SO FIDGETY OR RESTLESS THAT YOU HAVE BEEN MOVING AROUND A LOT MORE THAN USUAL: NOT AT ALL
7. TROUBLE CONCENTRATING ON THINGS, SUCH AS READING THE NEWSPAPER OR WATCHING TELEVISION: SEVERAL DAYS
7. TROUBLE CONCENTRATING ON THINGS, SUCH AS READING THE NEWSPAPER OR WATCHING TELEVISION: SEVERAL DAYS
6. FEELING BAD ABOUT YOURSELF - OR THAT YOU ARE A FAILURE OR HAVE LET YOURSELF OR YOUR FAMILY DOWN: NOT AT ALL
10. IF YOU CHECKED OFF ANY PROBLEMS, HOW DIFFICULT HAVE THESE PROBLEMS MADE IT FOR YOU TO DO YOUR WORK, TAKE CARE OF THINGS AT HOME, OR GET ALONG WITH OTHER PEOPLE: SOMEWHAT DIFFICULT
2. FEELING DOWN, DEPRESSED OR HOPELESS: NOT AT ALL
SUM OF ALL RESPONSES TO PHQ QUESTIONS 1-9: 7
SUM OF ALL RESPONSES TO PHQ QUESTIONS 1-9: 7

## 2024-04-19 ENCOUNTER — TELEPHONE (OUTPATIENT)
Dept: FAMILY MEDICINE CLINIC | Facility: CLINIC | Age: 54
End: 2024-04-19

## 2024-04-19 ENCOUNTER — OFFICE VISIT (OUTPATIENT)
Dept: FAMILY MEDICINE CLINIC | Facility: CLINIC | Age: 54
End: 2024-04-19
Payer: COMMERCIAL

## 2024-04-19 VITALS
WEIGHT: 186 LBS | HEART RATE: 83 BPM | HEIGHT: 61 IN | SYSTOLIC BLOOD PRESSURE: 170 MMHG | DIASTOLIC BLOOD PRESSURE: 110 MMHG | BODY MASS INDEX: 35.12 KG/M2

## 2024-04-19 DIAGNOSIS — F51.04 PSYCHOPHYSIOLOGICAL INSOMNIA: ICD-10-CM

## 2024-04-19 DIAGNOSIS — Z12.11 SPECIAL SCREENING FOR MALIGNANT NEOPLASMS, COLON: ICD-10-CM

## 2024-04-19 DIAGNOSIS — F32.5 MAJOR DEPRESSIVE DISORDER, SINGLE EPISODE, IN FULL REMISSION (HCC): ICD-10-CM

## 2024-04-19 DIAGNOSIS — N39.41 URGE INCONTINENCE: ICD-10-CM

## 2024-04-19 DIAGNOSIS — E66.01 CLASS 2 SEVERE OBESITY DUE TO EXCESS CALORIES WITH SERIOUS COMORBIDITY AND BODY MASS INDEX (BMI) OF 35.0 TO 35.9 IN ADULT (HCC): ICD-10-CM

## 2024-04-19 DIAGNOSIS — M25.562 CHRONIC PAIN OF LEFT KNEE: ICD-10-CM

## 2024-04-19 DIAGNOSIS — J45.20 MILD INTERMITTENT ASTHMA, UNSPECIFIED WHETHER COMPLICATED: ICD-10-CM

## 2024-04-19 DIAGNOSIS — Z13.31 SCREENING FOR DEPRESSION: ICD-10-CM

## 2024-04-19 DIAGNOSIS — E66.01 SEVERE OBESITY (BMI 35.0-39.9) WITH COMORBIDITY (HCC): ICD-10-CM

## 2024-04-19 DIAGNOSIS — I10 PRIMARY HYPERTENSION: ICD-10-CM

## 2024-04-19 DIAGNOSIS — Z00.00 ROUTINE GENERAL MEDICAL EXAMINATION AT A HEALTH CARE FACILITY: Primary | ICD-10-CM

## 2024-04-19 DIAGNOSIS — G89.29 CHRONIC PAIN OF LEFT KNEE: ICD-10-CM

## 2024-04-19 DIAGNOSIS — R79.89 LOW TSH LEVEL: ICD-10-CM

## 2024-04-19 PROCEDURE — 3077F SYST BP >= 140 MM HG: CPT | Performed by: FAMILY MEDICINE

## 2024-04-19 PROCEDURE — 3080F DIAST BP >= 90 MM HG: CPT | Performed by: FAMILY MEDICINE

## 2024-04-19 PROCEDURE — 99396 PREV VISIT EST AGE 40-64: CPT | Performed by: FAMILY MEDICINE

## 2024-04-19 RX ORDER — ALBUTEROL SULFATE 90 UG/1
AEROSOL, METERED RESPIRATORY (INHALATION)
Qty: 18 G | Refills: 5 | Status: SHIPPED | OUTPATIENT
Start: 2024-04-19

## 2024-04-19 RX ORDER — TRAZODONE HYDROCHLORIDE 50 MG/1
100 TABLET ORAL NIGHTLY
Qty: 180 TABLET | Refills: 3 | Status: SHIPPED | OUTPATIENT
Start: 2024-04-19

## 2024-04-19 RX ORDER — VALSARTAN 80 MG/1
80 TABLET ORAL DAILY
Qty: 90 TABLET | Refills: 3 | Status: SHIPPED | OUTPATIENT
Start: 2024-04-19

## 2024-04-19 SDOH — ECONOMIC STABILITY: FOOD INSECURITY: WITHIN THE PAST 12 MONTHS, YOU WORRIED THAT YOUR FOOD WOULD RUN OUT BEFORE YOU GOT MONEY TO BUY MORE.: NEVER TRUE

## 2024-04-19 SDOH — ECONOMIC STABILITY: FOOD INSECURITY: WITHIN THE PAST 12 MONTHS, THE FOOD YOU BOUGHT JUST DIDN'T LAST AND YOU DIDN'T HAVE MONEY TO GET MORE.: NEVER TRUE

## 2024-04-19 SDOH — ECONOMIC STABILITY: HOUSING INSECURITY
IN THE LAST 12 MONTHS, WAS THERE A TIME WHEN YOU DID NOT HAVE A STEADY PLACE TO SLEEP OR SLEPT IN A SHELTER (INCLUDING NOW)?: NO

## 2024-04-19 SDOH — ECONOMIC STABILITY: INCOME INSECURITY: HOW HARD IS IT FOR YOU TO PAY FOR THE VERY BASICS LIKE FOOD, HOUSING, MEDICAL CARE, AND HEATING?: NOT HARD AT ALL

## 2024-04-19 ASSESSMENT — ENCOUNTER SYMPTOMS
COUGH: 0
ABDOMINAL PAIN: 0
SHORTNESS OF BREATH: 0

## 2024-04-19 NOTE — TELEPHONE ENCOUNTER
Patient called:    Patient states that y'all discussed referral to ortho- L knee pain patient prefers [facility]  Patient blood pressure was high in office I checked it twice 170/112 and 170/110 with the large cuff. Patient wanted to know what to do?    Please advise, thanks!

## 2024-04-19 NOTE — PROGRESS NOTES
Granulocytes Abs 4.4 K/uL    RBC, POC 4.76 M/uL    Hemoglobin, POC 15.4 G/DL    Hematocrit, POC 47.9 %    MCV 90.6     MCH 31.4* 40 pg    MCHC 32.2     RDW, POC 11.9 %    Platelet Count,  K/UL    MPV POC 9.0 fL   AMB POC URINALYSIS DIP STICK AUTO W/O MICRO    Collection Time: 04/11/24  9:19 AM   Result Value Ref Range    Color, Urine, POC Yellow     Clarity, Urine, POC Clear     Glucose, Urine, POC Negative     Bilirubin, Urine, POC Negative     Ketones, Urine, POC Negative     Specific Gravity, Urine, POC 1.025 1.001 - 1.035    Blood, Urine, POC Negative     pH, Urine, POC 6.5 4.6 - 8.0    Protein, Urine, POC Negative     Urobilinogen, POC Normal     Nitrite, Urine, POC Negative     Leukocyte Esterase, Urine, POC Negative        ASSESSMENT and PLAN    Visit Diagnoses and Associated Orders       Routine general medical examination at a health care facility    -  Primary         Primary hypertension        valsartan (DIOVAN) 80 MG tablet [07414]           Psychophysiological insomnia        traZODone (DESYREL) 50 MG tablet [8085]           Urge incontinence        mirabegron (MYRBETRIQ) 50 MG TB24 [750097]           Mild intermittent asthma, unspecified whether complicated        albuterol sulfate HFA (PROVENTIL;VENTOLIN;PROAIR) 108 (90 Base) MCG/ACT inhaler [68020]           Special screening for malignant neoplasms, colon        Amb External Referral To Gastroenterology [FDW326 Custom]           Severe obesity (BMI 35.0-39.9) with comorbidity (HCC)             Major depressive disorder, single episode, in full remission (HCC)             Low TSH level        TSH with Reflex [[id number] Custom]   - Future Order         Screening for depression             Class 2 severe obesity due to excess calories with serious comorbidity and body mass index (BMI) of 35.0 to 35.9 in adult (HCC)                         Diagnosis Orders   1. Routine general medical examination at a health care facility        2. Primary

## 2024-04-30 DIAGNOSIS — F40.240 CLAUSTROPHOBIA: Primary | ICD-10-CM

## 2024-04-30 RX ORDER — ALPRAZOLAM 0.25 MG/1
0.25 TABLET ORAL
Qty: 1 TABLET | Refills: 0 | Status: SHIPPED | OUTPATIENT
Start: 2024-04-30 | End: 2024-04-30

## 2024-05-01 ENCOUNTER — HOSPITAL ENCOUNTER (OUTPATIENT)
Dept: MRI IMAGING | Age: 54
Discharge: HOME | End: 2024-05-04
Attending: INTERNAL MEDICINE
Payer: COMMERCIAL

## 2024-05-01 DIAGNOSIS — Z12.39 BREAST CANCER SCREENING, HIGH RISK PATIENT: ICD-10-CM

## 2024-05-01 DIAGNOSIS — R92.30 DENSE BREAST TISSUE: ICD-10-CM

## 2024-05-01 PROCEDURE — C8908 MRI W/O FOL W/CONT, BREAST,: HCPCS

## 2024-05-01 PROCEDURE — 6360000004 HC RX CONTRAST MEDICATION: Performed by: INTERNAL MEDICINE

## 2024-05-01 PROCEDURE — A9579 GAD-BASE MR CONTRAST NOS,1ML: HCPCS | Performed by: INTERNAL MEDICINE

## 2024-05-01 RX ADMIN — GADOTERIDOL 16 ML: 279.3 INJECTION, SOLUTION INTRAVENOUS at 18:38

## 2024-05-07 ENCOUNTER — OFFICE VISIT (OUTPATIENT)
Dept: ORTHOPEDIC SURGERY | Age: 54
End: 2024-05-07

## 2024-05-07 VITALS — WEIGHT: 182 LBS | BODY MASS INDEX: 34.36 KG/M2 | HEIGHT: 61 IN

## 2024-05-07 DIAGNOSIS — M25.562 CHRONIC PAIN OF LEFT KNEE: ICD-10-CM

## 2024-05-07 DIAGNOSIS — G89.29 CHRONIC PAIN OF LEFT KNEE: ICD-10-CM

## 2024-05-07 DIAGNOSIS — M17.12 ARTHRITIS OF LEFT KNEE: Primary | ICD-10-CM

## 2024-05-07 RX ORDER — DICLOFENAC SODIUM 75 MG/1
75 TABLET, DELAYED RELEASE ORAL 2 TIMES DAILY
Qty: 60 TABLET | Refills: 1 | Status: SHIPPED | OUTPATIENT
Start: 2024-05-07

## 2024-05-07 RX ORDER — TRIAMCINOLONE ACETONIDE 40 MG/ML
40 INJECTION, SUSPENSION INTRA-ARTICULAR; INTRAMUSCULAR ONCE
Status: COMPLETED | OUTPATIENT
Start: 2024-05-07 | End: 2024-05-07

## 2024-05-07 RX ADMIN — TRIAMCINOLONE ACETONIDE 40 MG: 40 INJECTION, SUSPENSION INTRA-ARTICULAR; INTRAMUSCULAR at 15:59

## 2024-05-07 NOTE — PROGRESS NOTES
Name: Allison Luke  YOB: 1970  Gender: female  MRN: [id number]    CC: Knee Pain (L)     HPI: Allison Luke is a 53 y.o. female who presents with Knee Pain (L)  She underwent left knee arthroscopy with partial medial meniscectomy/debridement on 6\10\2022.  Since that time she has had worsening pain in the knee.  She reports no new injury, pain with weight bearing activities and swelling intermittently.  She does note she has pain along the anterior aspect of her knee in the medial joint line.  She received an pes injection by Dr. Diaz in July 2022 after the surgery but states it did not help her with her pain.  She has been dealing with pain for the past 2 years and is here today for further evaluation.    ROS/Meds/PSH/PMH/FH/SH: I personally reviewed the patients standard intake form.  Below are the pertinents    Tobacco:  reports that she has never smoked. She has never been exposed to tobacco smoke. She has never used smokeless tobacco.  Diabetes: none  Other: Hypertension, KELSEA    Physical Examination:  General: no acute distress  Lungs: breathing easily  CV: regular rhythm by pulse  Left Knee:No previous surgical scars present. No joint effusion present. Patella tracks centrally with no patellofemoral grind. Neutral mechanical alignment present. Passive ROM: 0 degrees of hyperextension with 120 degrees of flexion. Stable to varus and valgus stress at full extension and 30 degrees of flexion.  Negative Lachman's. negative anterior drawer. negative posterior drawer.  Tender palpate over patellar tendon.  Pain with McMurrays over medial joint line. Tender to palpate over Medial joint line. Calf is soft and nontender to palpation. Motor and sensory intact distally. Dorsalis pedis pulse 2+.      Imaging:   Knee XR: 4 views     Clinical Indication  1. Arthritis of left knee    2. Chronic pain of left knee           Report: AP, lateral, PA flexion, sunrise views of the Left knee

## 2024-05-21 NOTE — PROGRESS NOTES
Name: Allison Luke  YOB: 1970  Gender: female  MRN: [id number]    CC: Knee Pain (L)     HPI: Allison Luke is a 53 y.o. female who is here today for her first round of Euflexxa injections in her left knee.     Physical Examination:  General: no acute distress  left Knee: Exam is unchanged, the knee continues to be painful with palpation and range of motion. There is no effusion or concern for infection.    Assessment:   1. Arthritis of left knee         Plan:     Left knee injected from a anterior lateral approach with 2 mL Euflexxa viscosupplementation after sterile prep.  Patient tolerated the procedure well was given postinjection flare precautions.      Follow up in one week for second round of injections      Carlie Ivy PA-C  Orthopaedics and Sports Medicine

## 2024-05-23 ENCOUNTER — HOSPITAL ENCOUNTER (OUTPATIENT)
Dept: MAMMOGRAPHY | Age: 54
End: 2024-05-23
Payer: COMMERCIAL

## 2024-05-23 ENCOUNTER — HOSPITAL ENCOUNTER (OUTPATIENT)
Dept: MAMMOGRAPHY | Age: 54
Discharge: HOME | End: 2024-05-23
Payer: COMMERCIAL

## 2024-05-23 DIAGNOSIS — R92.8 ABNORMAL MRI, BREAST: ICD-10-CM

## 2024-05-23 DIAGNOSIS — D05.01 LOBULAR CARCINOMA IN SITU (LCIS) OF RIGHT BREAST: ICD-10-CM

## 2024-05-23 PROCEDURE — G0279 TOMOSYNTHESIS, MAMMO: HCPCS

## 2024-05-23 PROCEDURE — 76642 ULTRASOUND BREAST LIMITED: CPT

## 2024-05-24 ENCOUNTER — OFFICE VISIT (OUTPATIENT)
Dept: ORTHOPEDIC SURGERY | Age: 54
End: 2024-05-24
Payer: COMMERCIAL

## 2024-05-24 DIAGNOSIS — M17.12 ARTHRITIS OF LEFT KNEE: Primary | ICD-10-CM

## 2024-05-24 PROCEDURE — 20610 DRAIN/INJ JOINT/BURSA W/O US: CPT | Performed by: PHYSICIAN ASSISTANT

## 2024-05-24 RX ORDER — HYALURONATE SODIUM 10 MG/ML
20 SYRINGE (ML) INTRAARTICULAR ONCE
Status: COMPLETED | OUTPATIENT
Start: 2024-05-24 | End: 2024-05-24

## 2024-05-24 RX ADMIN — Medication 20 MG: at 08:14

## 2024-05-31 ENCOUNTER — OFFICE VISIT (OUTPATIENT)
Dept: ORTHOPEDIC SURGERY | Age: 54
End: 2024-05-31
Payer: COMMERCIAL

## 2024-05-31 DIAGNOSIS — M17.12 ARTHRITIS OF LEFT KNEE: Primary | ICD-10-CM

## 2024-05-31 DIAGNOSIS — Z09 STATUS POST ORTHOPEDIC SURGERY, FOLLOW-UP EXAM: ICD-10-CM

## 2024-05-31 PROCEDURE — 20610 DRAIN/INJ JOINT/BURSA W/O US: CPT | Performed by: PHYSICIAN ASSISTANT

## 2024-05-31 RX ORDER — HYALURONATE SODIUM 10 MG/ML
20 SYRINGE (ML) INTRAARTICULAR ONCE
Status: COMPLETED | OUTPATIENT
Start: 2024-05-31 | End: 2024-05-31

## 2024-05-31 RX ADMIN — Medication 20 MG: at 13:12

## 2024-05-31 NOTE — PROGRESS NOTES
Name: Allison Luke  YOB: 1970  Gender: female  MRN: [id number]    CC: Knee Pain (L)     HPI: Allison Luke is a 53 y.o. female who presents with Knee Pain (L)  She is here today for her second injection of Euflexxa.     Physical Examination:  General: no acute distress  left Knee: Exam is unchanged, the knee continues to be painful with palpation and range of motion. There is no effusion or concern for infection.    Assessment:   1. Arthritis of left knee    2. Status post orthopedic surgery, follow-up exam         Plan:     Left knee injected from a superior lateral approach with 2 mL euflexxa viscosupplementation after sterile prep.  Patient tolerated the procedure well was given postinjection flare precautions.      Follow up next week for 3rd shot.    Carlie Ivy PA-C  Orthopaedics and Sports Medicine

## 2024-06-01 ENCOUNTER — HOSPITAL ENCOUNTER (OUTPATIENT)
Dept: MAMMOGRAPHY | Age: 54
End: 2024-06-01
Payer: COMMERCIAL

## 2024-06-01 DIAGNOSIS — R92.8 ABNORMAL ULTRASOUND OF BREAST: ICD-10-CM

## 2024-06-01 DIAGNOSIS — R92.8 ABNORMAL MAGNETIC RESONANCE IMAGING OF BREAST: ICD-10-CM

## 2024-06-01 PROCEDURE — 38505 NEEDLE BIOPSY LYMPH NODES: CPT

## 2024-06-01 PROCEDURE — 2500000003 HC RX 250 WO HCPCS: Performed by: INTERNAL MEDICINE

## 2024-06-01 PROCEDURE — 88305 TISSUE EXAM BY PATHOLOGIST: CPT

## 2024-06-01 PROCEDURE — 77065 DX MAMMO INCL CAD UNI: CPT

## 2024-06-01 RX ORDER — LIDOCAINE HYDROCHLORIDE AND EPINEPHRINE 10; 10 MG/ML; UG/ML
5 INJECTION, SOLUTION INFILTRATION; PERINEURAL ONCE
Status: COMPLETED | OUTPATIENT
Start: 2024-06-01 | End: 2024-06-01

## 2024-06-01 RX ORDER — LIDOCAINE HYDROCHLORIDE 10 MG/ML
5 INJECTION, SOLUTION INFILTRATION; PERINEURAL ONCE
Status: COMPLETED | OUTPATIENT
Start: 2024-06-01 | End: 2024-06-01

## 2024-06-01 RX ADMIN — LIDOCAINE HYDROCHLORIDE 5 ML: 10 INJECTION, SOLUTION INFILTRATION; PERINEURAL at 08:48

## 2024-06-01 RX ADMIN — LIDOCAINE HYDROCHLORIDE,EPINEPHRINE BITARTRATE 5 ML: 10; .01 INJECTION, SOLUTION INFILTRATION; PERINEURAL at 08:56

## 2024-06-04 ENCOUNTER — TELEPHONE (OUTPATIENT)
Dept: MAMMOGRAPHY | Age: 54
End: 2024-06-04

## 2024-06-05 NOTE — PROGRESS NOTES
Name: Allison Luke  YOB: 1970  Gender: female  MRN: [id number]    CC: Knee Pain (L)     HPI: Allison Luke is a 53 y.o. female who presents with left knee pain.    She is here today for her 3rd injection.     Physical Examination:  General: no acute distress  left Knee: Exam is unchanged, the knee continues to be painful with palpation and range of motion. There is no effusion or concern for infection.    Assessment:   1. Arthritis of left knee         Plan:     Left knee injected from a anterior lateral approach with 2 mL Euflexxa viscosupplementation after sterile prep.  Patient tolerated the procedure well was given postinjection flare precautions.      Follow up as needed      Carlie Ivy PA-C  Orthopaedics and Sports Medicine

## 2024-06-06 ENCOUNTER — CLINICAL DOCUMENTATION (OUTPATIENT)
Dept: CASE MANAGEMENT | Age: 54
End: 2024-06-06

## 2024-06-06 ENCOUNTER — TELEPHONE (OUTPATIENT)
Dept: CASE MANAGEMENT | Age: 54
End: 2024-06-06

## 2024-06-06 ENCOUNTER — TELEPHONE (OUTPATIENT)
Dept: ONCOLOGY | Age: 54
End: 2024-06-06

## 2024-06-06 DIAGNOSIS — F40.240 CLAUSTROPHOBIA: ICD-10-CM

## 2024-06-06 RX ORDER — ALPRAZOLAM 0.25 MG/1
0.25 TABLET ORAL
Qty: 1 TABLET | Refills: 0 | Status: SHIPPED | OUTPATIENT
Start: 2024-06-06 | End: 2024-06-06

## 2024-06-06 SDOH — ECONOMIC STABILITY: FOOD INSECURITY: WITHIN THE PAST 12 MONTHS, YOU WORRIED THAT YOUR FOOD WOULD RUN OUT BEFORE YOU GOT MONEY TO BUY MORE.: NEVER TRUE

## 2024-06-06 SDOH — ECONOMIC STABILITY: INCOME INSECURITY: IN THE LAST 12 MONTHS, WAS THERE A TIME WHEN YOU WERE NOT ABLE TO PAY THE MORTGAGE OR RENT ON TIME?: NO

## 2024-06-06 SDOH — ECONOMIC STABILITY: FOOD INSECURITY: WITHIN THE PAST 12 MONTHS, THE FOOD YOU BOUGHT JUST DIDN'T LAST AND YOU DIDN'T HAVE MONEY TO GET MORE.: NEVER TRUE

## 2024-06-06 SDOH — ECONOMIC STABILITY: TRANSPORTATION INSECURITY
IN THE PAST 12 MONTHS, HAS THE LACK OF TRANSPORTATION KEPT YOU FROM MEDICAL APPOINTMENTS OR FROM GETTING MEDICATIONS?: NO

## 2024-06-06 SDOH — ECONOMIC STABILITY: TRANSPORTATION INSECURITY
IN THE PAST 12 MONTHS, HAS LACK OF TRANSPORTATION KEPT YOU FROM MEETINGS, WORK, OR FROM GETTING THINGS NEEDED FOR DAILY LIVING?: NO

## 2024-06-06 ASSESSMENT — SOCIAL DETERMINANTS OF HEALTH (SDOH)
WITHIN THE LAST YEAR, HAVE YOU BEEN AFRAID OF YOUR PARTNER OR EX-PARTNER?: NO
HOW OFTEN DO YOU GET TOGETHER WITH FRIENDS OR RELATIVES?: MORE THAN THREE TIMES A WEEK
WITHIN THE LAST YEAR, HAVE YOU BEEN KICKED, HIT, SLAPPED, OR OTHERWISE PHYSICALLY HURT BY YOUR PARTNER OR EX-PARTNER?: NO
WITHIN THE LAST YEAR, HAVE YOU BEEN HUMILIATED OR EMOTIONALLY ABUSED IN OTHER WAYS BY YOUR PARTNER OR EX-PARTNER?: NO
IN A TYPICAL WEEK, HOW MANY TIMES DO YOU TALK ON THE PHONE WITH FAMILY, FRIENDS, OR NEIGHBORS?: MORE THAN THREE TIMES A WEEK
HOW HARD IS IT FOR YOU TO PAY FOR THE VERY BASICS LIKE FOOD, HOUSING, MEDICAL CARE, AND HEATING?: NOT HARD AT ALL
ARE YOU MARRIED, WIDOWED, DIVORCED, SEPARATED, NEVER MARRIED, OR LIVING WITH A PARTNER?: LIVING WITH PARTNER
WITHIN THE LAST YEAR, HAVE TO BEEN RAPED OR FORCED TO HAVE ANY KIND OF SEXUAL ACTIVITY BY YOUR PARTNER OR EX-PARTNER?: NO

## 2024-06-06 ASSESSMENT — PATIENT HEALTH QUESTIONNAIRE - PHQ9
SUM OF ALL RESPONSES TO PHQ QUESTIONS 1-9: 0
SUM OF ALL RESPONSES TO PHQ QUESTIONS 1-9: 0
1. LITTLE INTEREST OR PLEASURE IN DOING THINGS: NOT AT ALL
SUM OF ALL RESPONSES TO PHQ QUESTIONS 1-9: 0
2. FEELING DOWN, DEPRESSED OR HOPELESS: NOT AT ALL
SUM OF ALL RESPONSES TO PHQ QUESTIONS 1-9: 0
SUM OF ALL RESPONSES TO PHQ9 QUESTIONS 1 & 2: 0

## 2024-06-06 ASSESSMENT — LIFESTYLE VARIABLES
HOW MANY STANDARD DRINKS CONTAINING ALCOHOL DO YOU HAVE ON A TYPICAL DAY: PATIENT DOES NOT DRINK
HOW OFTEN DO YOU HAVE A DRINK CONTAINING ALCOHOL: NEVER

## 2024-06-06 NOTE — PROGRESS NOTES
Presented at Multidisciplinary Breast Conference today 6/6/2024.Next planned outreach will be 6/12 for follow up on plan of care.

## 2024-06-06 NOTE — TELEPHONE ENCOUNTER
The patient is an existing patient of Dr Hilario. Presented at Multidisciplinary Breast Conference today 6/6/2024.She has a planned biopsy 6/11. I called to introduce navigation and will ensure she has numbers for Tasha and ESTUARDO. She is concerned about the anxiety (claustrophobia) with the MRI and I have sent a message to Dr Hilario for possible medication. 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 She is  but remarrying her ex . They have two children that are adults and 4 grandchildren. SDOH reviewed. Next planned outreach will be to follow up after her biopsy and ensure she has appropriate oncology follow up.

## 2024-06-07 NOTE — TELEPHONE ENCOUNTER
Message routed to Dr. Hilario's team.  
Patient notified of new prescription.  
Patient previously prescribed Xanax 0.25mg for claustrophobia/anxiety to take prior to scan.  Prescription pended to Dr. Hilario for signature.    I have reviewed the patient’s controlled substance prescription history, as maintained in the [place] prescription monitoring program, so that the prescription(s) for a  controlled substance can be given.    
Patient was wondering if Dr Hilario would send in something for anxiety for her MRI biopsy on 6/11.   
Yes

## 2024-06-11 ENCOUNTER — HOSPITAL ENCOUNTER (OUTPATIENT)
Dept: MRI IMAGING | Age: 54
Discharge: HOME | End: 2024-06-14
Payer: COMMERCIAL

## 2024-06-11 ENCOUNTER — OFFICE VISIT (OUTPATIENT)
Dept: ORTHOPEDIC SURGERY | Age: 54
End: 2024-06-11
Payer: COMMERCIAL

## 2024-06-11 ENCOUNTER — HOSPITAL ENCOUNTER (OUTPATIENT)
Dept: MAMMOGRAPHY | Age: 54
Discharge: HOME | End: 2024-06-14
Payer: COMMERCIAL

## 2024-06-11 DIAGNOSIS — R92.8 ABNORMAL MAGNETIC RESONANCE IMAGING OF BREAST: ICD-10-CM

## 2024-06-11 DIAGNOSIS — M17.12 ARTHRITIS OF LEFT KNEE: Primary | ICD-10-CM

## 2024-06-11 DIAGNOSIS — R92.8 ABNORMAL MRI, BREAST: ICD-10-CM

## 2024-06-11 PROCEDURE — 6360000004 HC RX CONTRAST MEDICATION: Performed by: INTERNAL MEDICINE

## 2024-06-11 PROCEDURE — 77065 DX MAMMO INCL CAD UNI: CPT

## 2024-06-11 PROCEDURE — 20610 DRAIN/INJ JOINT/BURSA W/O US: CPT | Performed by: PHYSICIAN ASSISTANT

## 2024-06-11 PROCEDURE — 19086 BX BREAST ADD LESION MR IMAG: CPT

## 2024-06-11 PROCEDURE — 88305 TISSUE EXAM BY PATHOLOGIST: CPT

## 2024-06-11 PROCEDURE — 2500000003 HC RX 250 WO HCPCS: Performed by: INTERNAL MEDICINE

## 2024-06-11 PROCEDURE — A9579 GAD-BASE MR CONTRAST NOS,1ML: HCPCS | Performed by: INTERNAL MEDICINE

## 2024-06-11 PROCEDURE — A4648 IMPLANTABLE TISSUE MARKER: HCPCS

## 2024-06-11 RX ORDER — HYALURONATE SODIUM 10 MG/ML
20 SYRINGE (ML) INTRAARTICULAR ONCE
Status: COMPLETED | OUTPATIENT
Start: 2024-06-11 | End: 2024-06-11

## 2024-06-11 RX ORDER — LIDOCAINE HYDROCHLORIDE AND EPINEPHRINE 10; 10 MG/ML; UG/ML
20 INJECTION, SOLUTION INFILTRATION; PERINEURAL ONCE
Status: COMPLETED | OUTPATIENT
Start: 2024-06-11 | End: 2024-06-11

## 2024-06-11 RX ORDER — LIDOCAINE HYDROCHLORIDE 10 MG/ML
5 INJECTION, SOLUTION INFILTRATION; PERINEURAL ONCE
Status: COMPLETED | OUTPATIENT
Start: 2024-06-11 | End: 2024-06-11

## 2024-06-11 RX ADMIN — LIDOCAINE HYDROCHLORIDE 10 ML: 10 INJECTION, SOLUTION INFILTRATION; PERINEURAL at 12:05

## 2024-06-11 RX ADMIN — GADOTERIDOL 20 ML: 279.3 INJECTION, SOLUTION INTRAVENOUS at 12:05

## 2024-06-11 RX ADMIN — Medication 20 MG: at 08:10

## 2024-06-11 RX ADMIN — LIDOCAINE HYDROCHLORIDE,EPINEPHRINE BITARTRATE 30 ML: 10; .01 INJECTION, SOLUTION INFILTRATION; PERINEURAL at 12:06

## 2024-06-12 ENCOUNTER — TELEPHONE (OUTPATIENT)
Dept: ONCOLOGY | Age: 54
End: 2024-06-12

## 2024-06-12 NOTE — TELEPHONE ENCOUNTER
Call to patient with results of MRI biopsy done 6-11-24.  Patient knew results as she had seen in my chart and was appreciated the call.  Questions answered to the best of my ability.  Message to Dr. Juárez as well and he will see her tomorrow in office to go over plan.  Patient will see Dr. Hilario in follow up on 6-17-24 at 3:00 pm.    Pathology showed: Date Obtained:   6/11/2024   DIAGNOSIS       A:  \" MRI RIGHT BREAST, 11:00, 4 CM FROM NIPPLE, CORE BIOPSY\":         MICROSCOPIC FOCUS OF INFILTRATING LOBULAR CARCINOMA IN ASSOCIATION   WITH FOCI OF LOBULAR CARCINOMA IN SITU          B:  \" MRI RIGHT BREAST, 10:00, 8 CM FROM NIPPLE, CORE BIOPSY\":         FOCUS OF LOBULAR CARCINOMA IN SITU IN ASSOCIATION WITH BREAST HAVING   FAT NECROSIS.     Next planned outreach is 6-13-24 to follow up after visit with Dr. Juárez for plan of care.

## 2024-06-13 ENCOUNTER — CLINICAL DOCUMENTATION (OUTPATIENT)
Dept: ONCOLOGY | Age: 54
End: 2024-06-13

## 2024-06-13 ENCOUNTER — OFFICE VISIT (OUTPATIENT)
Dept: SURGERY | Age: 54
End: 2024-06-13
Payer: COMMERCIAL

## 2024-06-13 ENCOUNTER — PREP FOR PROCEDURE (OUTPATIENT)
Dept: SURGERY | Age: 54
End: 2024-06-13

## 2024-06-13 DIAGNOSIS — D05.01 NEOPLASM OF RIGHT BREAST, PRIMARY TUMOR STAGING CATEGORY TIS: LOBULAR CARCINOMA IN SITU (LCIS): ICD-10-CM

## 2024-06-13 DIAGNOSIS — D05.01 LOBULAR CARCINOMA IN SITU OF RIGHT BREAST: Primary | ICD-10-CM

## 2024-06-13 DIAGNOSIS — Z40.01 PROPHYLACTIC BREAST REMOVAL: ICD-10-CM

## 2024-06-13 DIAGNOSIS — C77.3 PRIMARY MALIGNANT NEOPLASM OF RIGHT BREAST WITH METASTASIS TO MOVABLE IPSILATERAL LEVEL 1 OR 2 AXILLARY LYMPH NODES (N1) (HCC): ICD-10-CM

## 2024-06-13 DIAGNOSIS — C50.911 PRIMARY MALIGNANT NEOPLASM OF RIGHT BREAST WITH METASTASIS TO MOVABLE IPSILATERAL LEVEL 1 OR 2 AXILLARY LYMPH NODES (N1) (HCC): ICD-10-CM

## 2024-06-13 DIAGNOSIS — Z86.000 HISTORY OF LOBULAR CARCINOMA IN SITU (LCIS) OF BREAST: ICD-10-CM

## 2024-06-13 DIAGNOSIS — D05.01 LOBULAR CARCINOMA IN SITU OF RIGHT BREAST: ICD-10-CM

## 2024-06-13 DIAGNOSIS — Z86.000 HISTORY OF LOBULAR CARCINOMA IN SITU (LCIS) OF BREAST: Primary | ICD-10-CM

## 2024-06-13 DIAGNOSIS — R92.8 ABNORMAL MAMMOGRAM OF RIGHT BREAST: ICD-10-CM

## 2024-06-13 PROCEDURE — 99214 OFFICE O/P EST MOD 30 MIN: CPT | Performed by: SURGERY

## 2024-06-13 ASSESSMENT — ENCOUNTER SYMPTOMS
ALLERGIC/IMMUNOLOGIC NEGATIVE: 1
GASTROINTESTINAL NEGATIVE: 1
EYES NEGATIVE: 1
RESPIRATORY NEGATIVE: 1

## 2024-06-13 NOTE — PROGRESS NOTES
Breast surgery bilateral mastectomy planned for 6-20-24 with Dr. Juárez.  Next planned follow up will be 6-18-24 review plan of care from oncology visit, 6-25-24 path review, TB 6-27-24.

## 2024-06-13 NOTE — H&P (VIEW-ONLY)
Intimate Partner Violence: Not At Risk (6/6/2024)    Humiliation, Afraid, Rape, and Kick questionnaire     Fear of Current or Ex-Partner: No     Emotionally Abused: No     Physically Abused: No     Sexually Abused: No   Housing Stability: Unknown (6/6/2024)    Housing Stability Vital Sign     Unable to Pay for Housing in the Last Year: No     Unstable Housing in the Last Year: No         PHYSICAL EXAMINATION:  Physical Exam  Constitutional:       Appearance: Normal appearance.   Cardiovascular:      Rate and Rhythm: Normal rate and regular rhythm.   Pulmonary:      Effort: Pulmonary effort is normal.   Chest:   Breasts:     Right: No mass or nipple discharge.      Left: No mass or nipple discharge.      Comments: Dense fibroglandular tissue bilaterally.  No discrete palpable masses.  She has postbiopsy bruising to the right breast.  Abdominal:      Palpations: Abdomen is soft.   Musculoskeletal:         General: Normal range of motion.      Cervical back: Normal range of motion and neck supple.   Lymphadenopathy:      Upper Body:      Right upper body: No supraclavicular or axillary adenopathy.      Left upper body: No supraclavicular or axillary adenopathy.   Skin:     General: Skin is warm and dry.   Neurological:      General: No focal deficit present.      Mental Status: She is alert and oriented to person, place, and time.      Sensory: Sensation is intact.   Psychiatric:         Mood and Affect: Mood normal.         Behavior: Behavior normal.         Thought Content: Thought content normal.            There were no vitals taken for this visit.      STUDIES:  MRI Result (most recent):  MRI NEEDLE BIOPSY EACH ADDL RIGHT 06/11/2024    Narrative  MRI-GUIDED BREAST BIOPSY, 2 SITE, RIGHT BREAST  RIGHT DIAGNOSTIC MAMMOGRAM    INDICATION: Indeterminate enhancement in the right breast, recently diagnosed  metastatic lobular carcinoma in a low right axillary lymph node    COMPARISON: Breast MRI  a bilateral mastectomy which I think is reasonable with her history of lobular carcinoma in situ and now with findings of a microscopic foci of invasive disease.  She is not interested in breast reconstruction.  She has 1 lymph node that was dysmorphic on MRI with biopsy showing metastatic disease.  The other nodes appear normal.  She has no palpable lymphadenopathy on examination.  I recommended we proceed with a Magseed localized excision of that right axillary lymph node along with a right axillary sentinel node biopsy. Reviewed surgery and risks including bleeding, infection, damage to nerves/vessels, scarring, recurrence, need for additional procedures.   ASSESSMENT/PLAN:  Allison was seen today for follow-up.    Diagnoses and all orders for this visit:    History of lobular carcinoma in situ (LCIS) of breast    Primary malignant neoplasm of right breast with metastasis to movable ipsilateral level 1 or 2 axillary lymph nodes (N1) (HCC)      Bilateral mastectomy.  Magseed localized excision of right axillary lymph node and right axillary sentinel node biopsy.    Roderick Juárez Jr, MD

## 2024-06-13 NOTE — PROGRESS NOTES
5/1/2024    PROCEDURE:  After informed consent was obtained, the patient was positioned prone in the MRI  scanner.  Multiplanar multisequence MRI images were obtained before and after  the uneventful IV administration of 20 cc ProHance.    The right breast abnormalities were localized on MRI images and targeted with  the aid of Zeptor software.  Biopsy was performed from a lateral approach. The  overlying skin at each site was prepped and draped in sterile fashion. 1%  Lidocaine was used for superficial anesthesia at each site. Lidocaine with  epinephrine was used for deeper anesthesia at each site.    Using MRI and CAD guidance, 8 gauge needle guides were advanced into the breast.  After appropriate position was confirmed with MRI imaging, multiple core samples  were obtained with vacuum assistance at each site. Follow-up imaging confirmed  adequate positioning of the biopsy cavities at each site. Clips were deployed.    LOCATION:  Site A - right breast 11:00 4 cm from the nipple, 1 hole marker  Site B - right breast 10:00 8 cm from the nipple, 2 hole marker    The needle guides were withdrawn and hemostasis was obtained. Patient tolerated  the procedure well without complications.    Postprocedure Mammogram:  Postprocedure right mammogram shows the biopsy clips  in expected location.    Impression  Vacuum assisted MRI-guided core biopsy of 2 separate areas of enhancement in the  right breast.  Pathology results are pending.      Electronically signed by KATHERINE JOHNSON      IMPRESSION:  History of lobular carcinoma in situ.  1 abnormal right axillary lymph node noted on MRI.  Biopsy of the lymph node showed metastatic carcinoma consistent with breast.  Additional workup with MRI biopsies of the right breast showed a microscopic focus of infiltrating lobular carcinoma. Long discussion today about breast cancer and treatments including lumpectomy, mastectomy, SLNB/ALND, chemotherapy, radiation.  She is elected for

## 2024-06-17 ENCOUNTER — OFFICE VISIT (OUTPATIENT)
Dept: ONCOLOGY | Age: 54
End: 2024-06-17

## 2024-06-17 ENCOUNTER — HOSPITAL ENCOUNTER (OUTPATIENT)
Dept: LAB | Age: 54
Discharge: HOME | End: 2024-06-20
Payer: COMMERCIAL

## 2024-06-17 VITALS
OXYGEN SATURATION: 96 % | TEMPERATURE: 98 F | HEART RATE: 105 BPM | WEIGHT: 182 LBS | DIASTOLIC BLOOD PRESSURE: 97 MMHG | HEIGHT: 61 IN | RESPIRATION RATE: 16 BRPM | BODY MASS INDEX: 34.36 KG/M2 | SYSTOLIC BLOOD PRESSURE: 147 MMHG

## 2024-06-17 DIAGNOSIS — Z71.89 GOALS OF CARE, COUNSELING/DISCUSSION: ICD-10-CM

## 2024-06-17 DIAGNOSIS — C50.912 LOBULAR CARCINOMA OF LEFT BREAST (HCC): Primary | ICD-10-CM

## 2024-06-17 DIAGNOSIS — D05.01 LOBULAR CARCINOMA IN SITU (LCIS) OF RIGHT BREAST: ICD-10-CM

## 2024-06-17 LAB
ALBUMIN SERPL-MCNC: 3.8 G/DL (ref 3.5–5)
ALBUMIN/GLOB SERPL: 1.2 (ref 1–1.9)
ALP SERPL-CCNC: 52 U/L (ref 35–104)
ALT SERPL-CCNC: 25 U/L (ref 12–65)
ANION GAP SERPL CALC-SCNC: 10 MMOL/L (ref 9–18)
AST SERPL-CCNC: 24 U/L (ref 15–37)
BASOPHILS # BLD: 0.1 K/UL (ref 0–0.2)
BASOPHILS NFR BLD: 1 % (ref 0–2)
BILIRUB SERPL-MCNC: 0.6 MG/DL (ref 0–1.2)
BUN SERPL-MCNC: 14 MG/DL (ref 6–23)
CALCIUM SERPL-MCNC: 9.3 MG/DL (ref 8.8–10.2)
CHLORIDE SERPL-SCNC: 102 MMOL/L (ref 98–107)
CO2 SERPL-SCNC: 30 MMOL/L (ref 20–28)
CREAT SERPL-MCNC: 0.84 MG/DL (ref 0.6–1.1)
DIFFERENTIAL METHOD BLD: ABNORMAL
EOSINOPHIL # BLD: 0.1 K/UL (ref 0–0.8)
EOSINOPHIL NFR BLD: 2 % (ref 0.5–7.8)
ERYTHROCYTE [DISTWIDTH] IN BLOOD BY AUTOMATED COUNT: 13.2 % (ref 11.9–14.6)
GLOBULIN SER CALC-MCNC: 3.3 G/DL (ref 2.3–3.5)
GLUCOSE SERPL-MCNC: 110 MG/DL (ref 70–99)
HCT VFR BLD AUTO: 46.6 % (ref 35.8–46.3)
HGB BLD-MCNC: 15.6 G/DL (ref 11.7–15.4)
IMM GRANULOCYTES # BLD AUTO: 0 K/UL (ref 0–0.5)
IMM GRANULOCYTES NFR BLD AUTO: 0 % (ref 0–5)
LYMPHOCYTES # BLD: 1.8 K/UL (ref 0.5–4.6)
LYMPHOCYTES NFR BLD: 30 % (ref 13–44)
MCH RBC QN AUTO: 31.6 PG (ref 26.1–32.9)
MCHC RBC AUTO-ENTMCNC: 33.5 G/DL (ref 31.4–35)
MCV RBC AUTO: 94.5 FL (ref 82–102)
MONOCYTES # BLD: 0.5 K/UL (ref 0.1–1.3)
MONOCYTES NFR BLD: 8 % (ref 4–12)
NEUTS SEG # BLD: 3.4 K/UL (ref 1.7–8.2)
NEUTS SEG NFR BLD: 59 % (ref 43–78)
NRBC # BLD: 0 K/UL (ref 0–0.2)
PLATELET # BLD AUTO: 273 K/UL (ref 150–450)
PMV BLD AUTO: 11 FL (ref 9.4–12.3)
POTASSIUM SERPL-SCNC: 3.6 MMOL/L (ref 3.5–5.1)
PROT SERPL-MCNC: 7.1 G/DL (ref 6.3–8.2)
RBC # BLD AUTO: 4.93 M/UL (ref 4.05–5.2)
SODIUM SERPL-SCNC: 142 MMOL/L (ref 136–145)
WBC # BLD AUTO: 5.9 K/UL (ref 4.3–11.1)

## 2024-06-17 PROCEDURE — 36415 COLL VENOUS BLD VENIPUNCTURE: CPT

## 2024-06-17 PROCEDURE — 80053 COMPREHEN METABOLIC PANEL: CPT

## 2024-06-17 PROCEDURE — 85025 COMPLETE CBC W/AUTO DIFF WBC: CPT

## 2024-06-17 ASSESSMENT — PATIENT HEALTH QUESTIONNAIRE - PHQ9
SUM OF ALL RESPONSES TO PHQ9 QUESTIONS 1 & 2: 0
SUM OF ALL RESPONSES TO PHQ QUESTIONS 1-9: 0
2. FEELING DOWN, DEPRESSED OR HOPELESS: NOT AT ALL
1. LITTLE INTEREST OR PLEASURE IN DOING THINGS: NOT AT ALL
SUM OF ALL RESPONSES TO PHQ QUESTIONS 1-9: 0

## 2024-06-17 ASSESSMENT — ENCOUNTER SYMPTOMS
ABDOMINAL PAIN: 0
SORE THROAT: 0
ABDOMINAL DISTENTION: 0
VOMITING: 0
DIARRHEA: 0
WHEEZING: 0
VOICE CHANGE: 0
BLOOD IN STOOL: 0
NAUSEA: 0
HEMOPTYSIS: 0
SHORTNESS OF BREATH: 0
SCLERAL ICTERUS: 0
CHEST TIGHTNESS: 0
CONSTIPATION: 0
TROUBLE SWALLOWING: 0

## 2024-06-17 NOTE — PROGRESS NOTES
[facility]: Established patient - follow up     Chief Complaint   Patient presents with    Follow-up     Referral Diagnosis: Neoplasm of right breast, primary tumor staging category Tis: lobular carcinoma in situ (LCIS)  Referring Provider: Roderick Juárez Jr., MD  Primary Care Provider: Berto Daigle DO  Family History of Cancer/ Hematology Disorders: None reported   Presenting Symptoms: Abnormal routine screening mammogram     History of Present Illness:  Ms. Luke is a 53 y.o. female who presents today for follow up regarding LCIS.  The past medical history is below.      At consultation, we discussed the pathophysiology of breast cancer, staging, and the importance of receptor status in terms of treatment options.  We then reviewed her medical history as well as oncologic history, recent imaging and pathology in detail.  We discussed role of chemoprevention and risks associated with LCIS.      Today, patient is here for follow-up.  Interim events noted.  At last appointment abnormality was palpated and patient was sent for imaging which confirmed need for bx.  Path c/w lobular invasive disease.  She is scheduled for mastectomy on 6/20.  Will proceed with CT CAP as a stat prior to surgery tomorrow.  She is in agreement with this plan and wishes to know baseline prior to proceeding with surgery.  No other issues or concerns at this time.  She has made peace with the decision of pursuing mastectomies.  Pathology reviewed in detail.  She was here with her daughter and .  We discussed the pathology and pathophysiology and staging of breast cancer.  We discussed the role of endocrine therapy as well as chemotherapy in the future.  Role of Oncotype DX discussed and it's predictive/prognostic value.  No other issues or concerns at this time.    Chronological Events:  7/16/18: Vacuum-assisted stereotactic core biopsy of calcifications in the right 1:00 breast with pathology

## 2024-06-17 NOTE — PERIOP NOTE
Patient verified name and .  Order for consent IS NOT found in EHR ; patient verifies procedure.   Type 2 surgery, phone assessment complete.  Orders not received.  Labs per surgeon: none  Labs per anesthesia protocol: CBC, CMP done 24 are within anesthesia protocols.      Patient answered medical/surgical history questions at their best of ability. All prior to admission medications documented in EPIC.    Patient instructed to continue taking all prescription medications up to the day of surgery but to take only the following medications the day of surgery according to anesthesia guidelines with a small sip of water: Lyrica, Claritin, Myrbetriq, use Albuterol inhaler if needed and bring to hospital.  Also, patient is requested to take 2 Tylenol in the morning and then again before bed on the day before surgery. Regular or extra strength may be used.       Patient informed that all vitamins and supplements should be held 7 days prior to surgery and NSAIDS 5 days prior to surgery.     Patient instructed on the following:    > Arrive at [facility] \"A\" Entrance, time of arrival to be called the day before by 1700  > NPO after midnight, unless otherwise indicated, including gum, mints, and ice chips  > Responsible adult must drive patient to the hospital, stay during surgery, and patient will need supervision 24 hours after anesthesia  > Use non moisturizing soap in shower the night before surgery and on the morning of surgery  > All piercings must be removed prior to arrival.    > Leave all valuables (money and jewelry) at home but bring insurance card and ID on DOS.   > You may be required to pay a deductible or co-pay on the day of your procedure. You can pre-pay by calling [phone] if your surgery is at the [facility] or [phone] if your surgery is at the [facility].  > Do not wear make-up, nail polish, lotions, cologne, perfumes, powders, or oil on skin. Artificial nails are not permitted.

## 2024-06-17 NOTE — PATIENT INSTRUCTIONS
refer to After Visit Summary or [facility] for upcoming appointment information. Please call our office for rescheduling needs at least 24 hours before your scheduled appointment time.If you have any questions regarding your upcoming schedule please reach out to your care team through [employer] or call [phone].     Please notify your assigned Nurse Navigator of any unplanned hospital admissions or Emergency Room visits within 24 hours of discharge.    -------------------------------------------------------------------------------------------------------------------  Please call our office at [phone] if you have any  of the following symptoms:   Fever of 100.5 or greater  Chills  Shortness of breath  Swelling or pain in one leg    After office hours an answering service is available and will contact a provider for emergencies or if you are experiencing any of the above symptoms.        EUGENIA OCASIO RN

## 2024-06-17 NOTE — PROGRESS NOTES
Verbal order for CT scan chest, abdomen, and pelvis with contrast received from [facility]: Ines Hilario, Medical Oncologist with verbal read back to confirm. Order signed and routed to provider for co signature.

## 2024-06-18 ENCOUNTER — CLINICAL DOCUMENTATION (OUTPATIENT)
Dept: ONCOLOGY | Age: 54
End: 2024-06-18

## 2024-06-18 NOTE — PROGRESS NOTES
Chart review for plan of care:  Patient plans surgery on 6-21-24 bilateral mastectomy with right SNB.  Next outreach will be 6-25-24 path review with likely oncotype DX, TB 6-27, F/U with Dr. Hilario 7-18.

## 2024-06-19 ENCOUNTER — HOSPITAL ENCOUNTER (OUTPATIENT)
Dept: MAMMOGRAPHY | Age: 54
Discharge: HOME | End: 2024-06-22
Payer: COMMERCIAL

## 2024-06-19 DIAGNOSIS — D05.01 NEOPLASM OF RIGHT BREAST, PRIMARY TUMOR STAGING CATEGORY TIS: LOBULAR CARCINOMA IN SITU (LCIS): ICD-10-CM

## 2024-06-19 DIAGNOSIS — D05.01 LOBULAR CARCINOMA IN SITU OF RIGHT BREAST: ICD-10-CM

## 2024-06-19 DIAGNOSIS — C77.3 PRIMARY MALIGNANT NEOPLASM OF RIGHT BREAST WITH METASTASIS TO MOVABLE IPSILATERAL LEVEL 1 OR 2 AXILLARY LYMPH NODES (N1) (HCC): ICD-10-CM

## 2024-06-19 DIAGNOSIS — Z40.01 PROPHYLACTIC BREAST REMOVAL: ICD-10-CM

## 2024-06-19 DIAGNOSIS — C50.911 PRIMARY MALIGNANT NEOPLASM OF RIGHT BREAST WITH METASTASIS TO MOVABLE IPSILATERAL LEVEL 1 OR 2 AXILLARY LYMPH NODES (N1) (HCC): ICD-10-CM

## 2024-06-19 DIAGNOSIS — Z86.000 HISTORY OF LOBULAR CARCINOMA IN SITU (LCIS) OF BREAST: ICD-10-CM

## 2024-06-19 DIAGNOSIS — R92.8 ABNORMAL MAMMOGRAM OF RIGHT BREAST: ICD-10-CM

## 2024-06-19 PROCEDURE — 77065 DX MAMMO INCL CAD UNI: CPT

## 2024-06-19 PROCEDURE — 2500000003 HC RX 250 WO HCPCS: Performed by: SURGERY

## 2024-06-19 PROCEDURE — 19285 PERQ DEV BREAST 1ST US IMAG: CPT

## 2024-06-19 RX ORDER — LIDOCAINE HYDROCHLORIDE 10 MG/ML
4 INJECTION, SOLUTION INFILTRATION; PERINEURAL ONCE
Status: COMPLETED | OUTPATIENT
Start: 2024-06-19 | End: 2024-06-19

## 2024-06-19 RX ADMIN — LIDOCAINE HYDROCHLORIDE 4 ML: 10 INJECTION, SOLUTION INFILTRATION; PERINEURAL at 09:03

## 2024-06-19 ASSESSMENT — PAIN SCALES - GENERAL: PAINLEVEL_OUTOF10: 0

## 2024-06-20 ENCOUNTER — ANESTHESIA EVENT (OUTPATIENT)
Dept: SURGERY | Age: 54
End: 2024-06-20
Payer: COMMERCIAL

## 2024-06-21 ENCOUNTER — APPOINTMENT (OUTPATIENT)
Dept: MAMMOGRAPHY | Age: 54
End: 2024-06-21
Attending: SURGERY
Payer: COMMERCIAL

## 2024-06-21 ENCOUNTER — APPOINTMENT (OUTPATIENT)
Dept: NUCLEAR MEDICINE | Age: 54
End: 2024-06-21
Attending: SURGERY
Payer: COMMERCIAL

## 2024-06-21 ENCOUNTER — ANESTHESIA (OUTPATIENT)
Dept: SURGERY | Age: 54
End: 2024-06-21
Payer: COMMERCIAL

## 2024-06-21 ENCOUNTER — HOSPITAL ENCOUNTER (OUTPATIENT)
Age: 54
Setting detail: OBSERVATION
Discharge: HOME | End: 2024-06-22
Attending: SURGERY | Admitting: SURGERY
Payer: COMMERCIAL

## 2024-06-21 DIAGNOSIS — D05.01 LOBULAR CARCINOMA IN SITU OF RIGHT BREAST: ICD-10-CM

## 2024-06-21 DIAGNOSIS — Z86.000 HISTORY OF LOBULAR CARCINOMA IN SITU (LCIS) OF BREAST: ICD-10-CM

## 2024-06-21 DIAGNOSIS — Z40.01 PROPHYLACTIC BREAST REMOVAL: ICD-10-CM

## 2024-06-21 DIAGNOSIS — R92.8 ABNORMAL MAMMOGRAM OF RIGHT BREAST: ICD-10-CM

## 2024-06-21 DIAGNOSIS — D05.01 NEOPLASM OF RIGHT BREAST, PRIMARY TUMOR STAGING CATEGORY TIS: LOBULAR CARCINOMA IN SITU (LCIS): ICD-10-CM

## 2024-06-21 DIAGNOSIS — C50.411 MALIGNANT NEOPLASM OF UPPER-OUTER QUADRANT OF RIGHT BREAST IN FEMALE, ESTROGEN RECEPTOR POSITIVE (HCC): Primary | ICD-10-CM

## 2024-06-21 DIAGNOSIS — C77.3 PRIMARY MALIGNANT NEOPLASM OF RIGHT BREAST WITH METASTASIS TO MOVABLE IPSILATERAL LEVEL 1 OR 2 AXILLARY LYMPH NODES (N1) (HCC): ICD-10-CM

## 2024-06-21 DIAGNOSIS — C50.911 PRIMARY MALIGNANT NEOPLASM OF RIGHT BREAST WITH METASTASIS TO MOVABLE IPSILATERAL LEVEL 1 OR 2 AXILLARY LYMPH NODES (N1) (HCC): ICD-10-CM

## 2024-06-21 DIAGNOSIS — Z17.0 MALIGNANT NEOPLASM OF UPPER-OUTER QUADRANT OF RIGHT BREAST IN FEMALE, ESTROGEN RECEPTOR POSITIVE (HCC): Primary | ICD-10-CM

## 2024-06-21 PROBLEM — C50.919 BREAST CANCER IN FEMALE (HCC): Status: ACTIVE | Noted: 2024-06-21

## 2024-06-21 PROCEDURE — 76942 ECHO GUIDE FOR BIOPSY: CPT | Performed by: ANESTHESIOLOGY

## 2024-06-21 PROCEDURE — 2500000003 HC RX 250 WO HCPCS: Performed by: NURSE ANESTHETIST, CERTIFIED REGISTERED

## 2024-06-21 PROCEDURE — 94761 N-INVAS EAR/PLS OXIMETRY MLT: CPT

## 2024-06-21 PROCEDURE — 76098 X-RAY EXAM SURGICAL SPECIMEN: CPT

## 2024-06-21 PROCEDURE — 38900 IO MAP OF SENT LYMPH NODE: CPT | Performed by: SURGERY

## 2024-06-21 PROCEDURE — 88307 TISSUE EXAM BY PATHOLOGIST: CPT

## 2024-06-21 PROCEDURE — G0378 HOSPITAL OBSERVATION PER HR: HCPCS

## 2024-06-21 PROCEDURE — 7100000001 HC PACU RECOVERY - ADDTL 15 MIN: Performed by: SURGERY

## 2024-06-21 PROCEDURE — 6360000002 HC RX W HCPCS: Performed by: SURGERY

## 2024-06-21 PROCEDURE — 78195 LYMPH SYSTEM IMAGING: CPT

## 2024-06-21 PROCEDURE — 6360000002 HC RX W HCPCS: Performed by: NURSE ANESTHETIST, CERTIFIED REGISTERED

## 2024-06-21 PROCEDURE — 2700000000 HC OXYGEN THERAPY PER DAY

## 2024-06-21 PROCEDURE — 6370000000 HC RX 637 (ALT 250 FOR IP): Performed by: SURGERY

## 2024-06-21 PROCEDURE — 2720000010 HC SURG SUPPLY STERILE: Performed by: SURGERY

## 2024-06-21 PROCEDURE — 6360000002 HC RX W HCPCS: Performed by: ANESTHESIOLOGY

## 2024-06-21 PROCEDURE — 38500 BIOPSY/REMOVAL LYMPH NODES: CPT | Performed by: SURGERY

## 2024-06-21 PROCEDURE — 2709999900 HC NON-CHARGEABLE SUPPLY: Performed by: SURGERY

## 2024-06-21 PROCEDURE — 94760 N-INVAS EAR/PLS OXIMETRY 1: CPT

## 2024-06-21 PROCEDURE — 19303 MAST SIMPLE COMPLETE: CPT | Performed by: SURGERY

## 2024-06-21 PROCEDURE — 3700000001 HC ADD 15 MINUTES (ANESTHESIA): Performed by: SURGERY

## 2024-06-21 PROCEDURE — 3700000000 HC ANESTHESIA ATTENDED CARE: Performed by: SURGERY

## 2024-06-21 PROCEDURE — 3600000004 HC SURGERY LEVEL 4 BASE: Performed by: SURGERY

## 2024-06-21 PROCEDURE — 3600000014 HC SURGERY LEVEL 4 ADDTL 15MIN: Performed by: SURGERY

## 2024-06-21 PROCEDURE — 2580000003 HC RX 258: Performed by: ANESTHESIOLOGY

## 2024-06-21 PROCEDURE — A9541 TC99M SULFUR COLLOID: HCPCS | Performed by: SURGERY

## 2024-06-21 PROCEDURE — 2500000003 HC RX 250 WO HCPCS: Performed by: ANESTHESIOLOGY

## 2024-06-21 PROCEDURE — 2580000003 HC RX 258: Performed by: SURGERY

## 2024-06-21 PROCEDURE — 7100000000 HC PACU RECOVERY - FIRST 15 MIN: Performed by: SURGERY

## 2024-06-21 PROCEDURE — 6370000000 HC RX 637 (ALT 250 FOR IP): Performed by: ANESTHESIOLOGY

## 2024-06-21 PROCEDURE — 3430000000 HC RX DIAGNOSTIC RADIOPHARMACEUTICAL: Performed by: SURGERY

## 2024-06-21 RX ORDER — DEXAMETHASONE SODIUM PHOSPHATE 10 MG/ML
INJECTION INTRAMUSCULAR; INTRAVENOUS PRN
Status: DISCONTINUED | OUTPATIENT
Start: 2024-06-21 | End: 2024-06-21 | Stop reason: SDUPTHER

## 2024-06-21 RX ORDER — SODIUM CHLORIDE 0.9 % (FLUSH) 0.9 %
5-40 SYRINGE (ML) INJECTION PRN
Status: DISCONTINUED | OUTPATIENT
Start: 2024-06-21 | End: 2024-06-21 | Stop reason: HOSPADM

## 2024-06-21 RX ORDER — OXYCODONE HYDROCHLORIDE 5 MG/1
5 TABLET ORAL
Status: DISCONTINUED | OUTPATIENT
Start: 2024-06-21 | End: 2024-06-21 | Stop reason: HOSPADM

## 2024-06-21 RX ORDER — FENTANYL CITRATE 50 UG/ML
INJECTION, SOLUTION INTRAMUSCULAR; INTRAVENOUS PRN
Status: DISCONTINUED | OUTPATIENT
Start: 2024-06-21 | End: 2024-06-21 | Stop reason: SDUPTHER

## 2024-06-21 RX ORDER — SODIUM CHLORIDE 0.9 % (FLUSH) 0.9 %
5-40 SYRINGE (ML) INJECTION EVERY 12 HOURS SCHEDULED
Status: DISCONTINUED | OUTPATIENT
Start: 2024-06-21 | End: 2024-06-21 | Stop reason: HOSPADM

## 2024-06-21 RX ORDER — ONDANSETRON 2 MG/ML
4 INJECTION INTRAMUSCULAR; INTRAVENOUS
Status: COMPLETED | OUTPATIENT
Start: 2024-06-21 | End: 2024-06-21

## 2024-06-21 RX ORDER — ACETAMINOPHEN 325 MG/1
650 TABLET ORAL EVERY 6 HOURS
Status: DISCONTINUED | OUTPATIENT
Start: 2024-06-21 | End: 2024-06-22 | Stop reason: HOSPADM

## 2024-06-21 RX ORDER — TRAZODONE HYDROCHLORIDE 50 MG/1
100 TABLET ORAL NIGHTLY
Status: DISCONTINUED | OUTPATIENT
Start: 2024-06-21 | End: 2024-06-22 | Stop reason: HOSPADM

## 2024-06-21 RX ORDER — SCOLOPAMINE TRANSDERMAL SYSTEM 1 MG/1
1 PATCH, EXTENDED RELEASE TRANSDERMAL
Status: DISCONTINUED | OUTPATIENT
Start: 2024-06-21 | End: 2024-06-21

## 2024-06-21 RX ORDER — ONDANSETRON 4 MG/1
4 TABLET, ORALLY DISINTEGRATING ORAL EVERY 8 HOURS PRN
Status: DISCONTINUED | OUTPATIENT
Start: 2024-06-21 | End: 2024-06-22 | Stop reason: HOSPADM

## 2024-06-21 RX ORDER — BUPIVACAINE HYDROCHLORIDE AND EPINEPHRINE 2.5; 5 MG/ML; UG/ML
INJECTION, SOLUTION EPIDURAL; INFILTRATION; INTRACAUDAL; PERINEURAL
Status: COMPLETED | OUTPATIENT
Start: 2024-06-21 | End: 2024-06-21

## 2024-06-21 RX ORDER — SODIUM CHLORIDE, SODIUM LACTATE, POTASSIUM CHLORIDE, CALCIUM CHLORIDE 600; 310; 30; 20 MG/100ML; MG/100ML; MG/100ML; MG/100ML
INJECTION, SOLUTION INTRAVENOUS CONTINUOUS
Status: DISCONTINUED | OUTPATIENT
Start: 2024-06-21 | End: 2024-06-21 | Stop reason: HOSPADM

## 2024-06-21 RX ORDER — MIDAZOLAM HYDROCHLORIDE 2 MG/2ML
2 INJECTION, SOLUTION INTRAMUSCULAR; INTRAVENOUS
Status: COMPLETED | OUTPATIENT
Start: 2024-06-21 | End: 2024-06-21

## 2024-06-21 RX ORDER — OXYCODONE HYDROCHLORIDE 5 MG/1
5 TABLET ORAL EVERY 4 HOURS PRN
Status: DISCONTINUED | OUTPATIENT
Start: 2024-06-21 | End: 2024-06-22 | Stop reason: HOSPADM

## 2024-06-21 RX ORDER — OXYCODONE HYDROCHLORIDE 5 MG/1
10 TABLET ORAL EVERY 4 HOURS PRN
Status: DISCONTINUED | OUTPATIENT
Start: 2024-06-21 | End: 2024-06-22 | Stop reason: HOSPADM

## 2024-06-21 RX ORDER — DIAZEPAM 5 MG/1
5 TABLET ORAL EVERY 6 HOURS PRN
Status: DISCONTINUED | OUTPATIENT
Start: 2024-06-21 | End: 2024-06-22 | Stop reason: HOSPADM

## 2024-06-21 RX ORDER — SODIUM CHLORIDE 9 MG/ML
INJECTION, SOLUTION INTRAVENOUS PRN
Status: DISCONTINUED | OUTPATIENT
Start: 2024-06-21 | End: 2024-06-21 | Stop reason: HOSPADM

## 2024-06-21 RX ORDER — PREGABALIN 50 MG/1
50 CAPSULE ORAL 3 TIMES DAILY
Status: DISCONTINUED | OUTPATIENT
Start: 2024-06-21 | End: 2024-06-22 | Stop reason: HOSPADM

## 2024-06-21 RX ORDER — OXYCODONE HYDROCHLORIDE 5 MG/1
5 TABLET ORAL EVERY 6 HOURS PRN
Qty: 20 TABLET | Refills: 0 | Status: SHIPPED | OUTPATIENT
Start: 2024-06-21 | End: 2024-06-26

## 2024-06-21 RX ORDER — KETAMINE HCL IN NACL, ISO-OSM 20 MG/2 ML
SYRINGE (ML) INJECTION PRN
Status: DISCONTINUED | OUTPATIENT
Start: 2024-06-21 | End: 2024-06-21 | Stop reason: SDUPTHER

## 2024-06-21 RX ORDER — ALBUTEROL SULFATE 90 UG/1
2 AEROSOL, METERED RESPIRATORY (INHALATION) EVERY 4 HOURS PRN
Status: DISCONTINUED | OUTPATIENT
Start: 2024-06-21 | End: 2024-06-22 | Stop reason: HOSPADM

## 2024-06-21 RX ORDER — MIDAZOLAM HYDROCHLORIDE 1 MG/ML
INJECTION INTRAMUSCULAR; INTRAVENOUS PRN
Status: DISCONTINUED | OUTPATIENT
Start: 2024-06-21 | End: 2024-06-21 | Stop reason: SDUPTHER

## 2024-06-21 RX ORDER — ONDANSETRON 2 MG/ML
INJECTION INTRAMUSCULAR; INTRAVENOUS PRN
Status: DISCONTINUED | OUTPATIENT
Start: 2024-06-21 | End: 2024-06-21 | Stop reason: SDUPTHER

## 2024-06-21 RX ORDER — PROPOFOL 10 MG/ML
INJECTION, EMULSION INTRAVENOUS PRN
Status: DISCONTINUED | OUTPATIENT
Start: 2024-06-21 | End: 2024-06-21 | Stop reason: SDUPTHER

## 2024-06-21 RX ORDER — METHYLENE BLUE 10 MG/ML
INJECTION INTRAVENOUS PRN
Status: DISCONTINUED | OUTPATIENT
Start: 2024-06-21 | End: 2024-06-21 | Stop reason: HOSPADM

## 2024-06-21 RX ORDER — NALOXONE HYDROCHLORIDE 0.4 MG/ML
INJECTION, SOLUTION INTRAMUSCULAR; INTRAVENOUS; SUBCUTANEOUS PRN
Status: DISCONTINUED | OUTPATIENT
Start: 2024-06-21 | End: 2024-06-21 | Stop reason: HOSPADM

## 2024-06-21 RX ORDER — FENTANYL CITRATE 50 UG/ML
100 INJECTION, SOLUTION INTRAMUSCULAR; INTRAVENOUS
Status: DISCONTINUED | OUTPATIENT
Start: 2024-06-21 | End: 2024-06-21 | Stop reason: HOSPADM

## 2024-06-21 RX ORDER — LIDOCAINE HYDROCHLORIDE 20 MG/ML
INJECTION, SOLUTION EPIDURAL; INFILTRATION; INTRACAUDAL; PERINEURAL PRN
Status: DISCONTINUED | OUTPATIENT
Start: 2024-06-21 | End: 2024-06-21 | Stop reason: SDUPTHER

## 2024-06-21 RX ORDER — FENTANYL CITRATE 50 UG/ML
25 INJECTION, SOLUTION INTRAMUSCULAR; INTRAVENOUS EVERY 5 MIN PRN
Status: DISCONTINUED | OUTPATIENT
Start: 2024-06-21 | End: 2024-06-21 | Stop reason: HOSPADM

## 2024-06-21 RX ORDER — DIPHENHYDRAMINE HYDROCHLORIDE 50 MG/ML
12.5 INJECTION INTRAMUSCULAR; INTRAVENOUS
Status: DISCONTINUED | OUTPATIENT
Start: 2024-06-21 | End: 2024-06-21 | Stop reason: HOSPADM

## 2024-06-21 RX ORDER — EPHEDRINE SULFATE/0.9% NACL/PF 50 MG/5 ML
SYRINGE (ML) INTRAVENOUS PRN
Status: DISCONTINUED | OUTPATIENT
Start: 2024-06-21 | End: 2024-06-21 | Stop reason: SDUPTHER

## 2024-06-21 RX ORDER — SODIUM CHLORIDE 9 MG/ML
INJECTION, SOLUTION INTRAVENOUS CONTINUOUS
Status: DISCONTINUED | OUTPATIENT
Start: 2024-06-21 | End: 2024-06-22 | Stop reason: HOSPADM

## 2024-06-21 RX ORDER — TROSPIUM CHLORIDE 20 MG/1
20 TABLET, FILM COATED ORAL NIGHTLY
Status: DISCONTINUED | OUTPATIENT
Start: 2024-06-21 | End: 2024-06-22

## 2024-06-21 RX ORDER — ROCURONIUM BROMIDE 10 MG/ML
INJECTION, SOLUTION INTRAVENOUS PRN
Status: DISCONTINUED | OUTPATIENT
Start: 2024-06-21 | End: 2024-06-21 | Stop reason: SDUPTHER

## 2024-06-21 RX ORDER — HYDROMORPHONE HYDROCHLORIDE 2 MG/ML
INJECTION, SOLUTION INTRAMUSCULAR; INTRAVENOUS; SUBCUTANEOUS PRN
Status: DISCONTINUED | OUTPATIENT
Start: 2024-06-21 | End: 2024-06-21 | Stop reason: SDUPTHER

## 2024-06-21 RX ORDER — METOCLOPRAMIDE HYDROCHLORIDE 5 MG/ML
10 INJECTION INTRAMUSCULAR; INTRAVENOUS
Status: DISCONTINUED | OUTPATIENT
Start: 2024-06-21 | End: 2024-06-21 | Stop reason: HOSPADM

## 2024-06-21 RX ORDER — GLYCOPYRROLATE 0.2 MG/ML
INJECTION INTRAMUSCULAR; INTRAVENOUS PRN
Status: DISCONTINUED | OUTPATIENT
Start: 2024-06-21 | End: 2024-06-21 | Stop reason: SDUPTHER

## 2024-06-21 RX ORDER — VALSARTAN 40 MG/1
80 TABLET ORAL DAILY
Status: DISCONTINUED | OUTPATIENT
Start: 2024-06-22 | End: 2024-06-22 | Stop reason: HOSPADM

## 2024-06-21 RX ORDER — NEOSTIGMINE METHYLSULFATE 1 MG/ML
INJECTION, SOLUTION INTRAVENOUS PRN
Status: DISCONTINUED | OUTPATIENT
Start: 2024-06-21 | End: 2024-06-21 | Stop reason: SDUPTHER

## 2024-06-21 RX ADMIN — PHENYLEPHRINE HYDROCHLORIDE 100 MCG: 0.1 INJECTION, SOLUTION INTRAVENOUS at 10:17

## 2024-06-21 RX ADMIN — FENTANYL CITRATE 100 MCG: 50 INJECTION, SOLUTION INTRAMUSCULAR; INTRAVENOUS at 10:08

## 2024-06-21 RX ADMIN — MIDAZOLAM 2 MG: 1 INJECTION INTRAMUSCULAR; INTRAVENOUS at 09:59

## 2024-06-21 RX ADMIN — PHENYLEPHRINE HYDROCHLORIDE 100 MCG: 0.1 INJECTION, SOLUTION INTRAVENOUS at 11:21

## 2024-06-21 RX ADMIN — SODIUM CHLORIDE, SODIUM LACTATE, POTASSIUM CHLORIDE, AND CALCIUM CHLORIDE: 600; 310; 30; 20 INJECTION, SOLUTION INTRAVENOUS at 11:40

## 2024-06-21 RX ADMIN — ACETAMINOPHEN 650 MG: 325 TABLET, FILM COATED ORAL at 23:40

## 2024-06-21 RX ADMIN — BUPIVACAINE HYDROCHLORIDE AND EPINEPHRINE 20 ML: 2.5; 5 INJECTION, SOLUTION EPIDURAL; INFILTRATION; INTRACAUDAL; PERINEURAL at 09:15

## 2024-06-21 RX ADMIN — PHENYLEPHRINE HYDROCHLORIDE 50 MCG: 0.1 INJECTION, SOLUTION INTRAVENOUS at 11:14

## 2024-06-21 RX ADMIN — SODIUM CHLORIDE, SODIUM LACTATE, POTASSIUM CHLORIDE, AND CALCIUM CHLORIDE: 600; 310; 30; 20 INJECTION, SOLUTION INTRAVENOUS at 09:57

## 2024-06-21 RX ADMIN — GLYCOPYRROLATE 0.4 MG: 0.2 INJECTION INTRAMUSCULAR; INTRAVENOUS at 12:00

## 2024-06-21 RX ADMIN — OXYCODONE HYDROCHLORIDE 5 MG: 5 TABLET ORAL at 13:38

## 2024-06-21 RX ADMIN — ACETAMINOPHEN 650 MG: 325 TABLET, FILM COATED ORAL at 13:37

## 2024-06-21 RX ADMIN — PROPOFOL 200 MG: 10 INJECTION, EMULSION INTRAVENOUS at 10:08

## 2024-06-21 RX ADMIN — Medication 2000 MG: at 10:12

## 2024-06-21 RX ADMIN — ONDANSETRON 4 MG: 2 INJECTION INTRAMUSCULAR; INTRAVENOUS at 11:38

## 2024-06-21 RX ADMIN — BUPIVACAINE HYDROCHLORIDE AND EPINEPHRINE 10 ML: 2.5; 5 INJECTION, SOLUTION EPIDURAL; INFILTRATION; INTRACAUDAL; PERINEURAL at 09:13

## 2024-06-21 RX ADMIN — PHENYLEPHRINE HYDROCHLORIDE 100 MCG: 0.1 INJECTION, SOLUTION INTRAVENOUS at 11:33

## 2024-06-21 RX ADMIN — Medication 3 MG: at 12:00

## 2024-06-21 RX ADMIN — LIDOCAINE HYDROCHLORIDE 100 MG: 20 INJECTION, SOLUTION EPIDURAL; INFILTRATION; INTRACAUDAL; PERINEURAL at 10:08

## 2024-06-21 RX ADMIN — TRAZODONE HYDROCHLORIDE 100 MG: 50 TABLET ORAL at 21:34

## 2024-06-21 RX ADMIN — Medication 5 MG: at 11:39

## 2024-06-21 RX ADMIN — SODIUM CHLORIDE: 9 INJECTION, SOLUTION INTRAVENOUS at 13:50

## 2024-06-21 RX ADMIN — PHENYLEPHRINE HYDROCHLORIDE 50 MCG: 0.1 INJECTION, SOLUTION INTRAVENOUS at 10:51

## 2024-06-21 RX ADMIN — HYDROMORPHONE HYDROCHLORIDE 0.4 MG: 2 INJECTION INTRAMUSCULAR; INTRAVENOUS; SUBCUTANEOUS at 10:58

## 2024-06-21 RX ADMIN — PHENYLEPHRINE HYDROCHLORIDE 50 MCG: 0.1 INJECTION, SOLUTION INTRAVENOUS at 10:19

## 2024-06-21 RX ADMIN — TECHNETIUM TC 99M SULFUR COLLOID 0.5 MILLICURIE: KIT at 08:05

## 2024-06-21 RX ADMIN — BUPIVACAINE HYDROCHLORIDE AND EPINEPHRINE 10 ML: 2.5; 5 INJECTION, SOLUTION EPIDURAL; INFILTRATION; INTRACAUDAL; PERINEURAL at 09:17

## 2024-06-21 RX ADMIN — MIDAZOLAM 2 MG: 1 INJECTION INTRAMUSCULAR; INTRAVENOUS at 09:07

## 2024-06-21 RX ADMIN — BUPIVACAINE HYDROCHLORIDE AND EPINEPHRINE BITARTRATE 20 ML: 2.5; .005 INJECTION, SOLUTION EPIDURAL; INFILTRATION; INTRACAUDAL; PERINEURAL at 09:12

## 2024-06-21 RX ADMIN — SODIUM CHLORIDE, SODIUM LACTATE, POTASSIUM CHLORIDE, AND CALCIUM CHLORIDE: 600; 310; 30; 20 INJECTION, SOLUTION INTRAVENOUS at 07:38

## 2024-06-21 RX ADMIN — ACETAMINOPHEN 650 MG: 325 TABLET, FILM COATED ORAL at 18:31

## 2024-06-21 RX ADMIN — PHENYLEPHRINE HYDROCHLORIDE 100 MCG: 0.1 INJECTION, SOLUTION INTRAVENOUS at 11:27

## 2024-06-21 RX ADMIN — ROCURONIUM BROMIDE 40 MG: 10 INJECTION, SOLUTION INTRAVENOUS at 10:08

## 2024-06-21 RX ADMIN — SODIUM CHLORIDE: 9 INJECTION, SOLUTION INTRAVENOUS at 23:37

## 2024-06-21 RX ADMIN — Medication 20 MG: at 10:25

## 2024-06-21 RX ADMIN — PHENYLEPHRINE HYDROCHLORIDE 50 MCG: 0.1 INJECTION, SOLUTION INTRAVENOUS at 10:53

## 2024-06-21 RX ADMIN — ONDANSETRON 4 MG: 2 INJECTION INTRAMUSCULAR; INTRAVENOUS at 12:30

## 2024-06-21 RX ADMIN — PREGABALIN 50 MG: 50 CAPSULE ORAL at 13:37

## 2024-06-21 RX ADMIN — HYDROMORPHONE HYDROCHLORIDE 0.6 MG: 2 INJECTION INTRAMUSCULAR; INTRAVENOUS; SUBCUTANEOUS at 10:31

## 2024-06-21 RX ADMIN — DEXAMETHASONE SODIUM PHOSPHATE 8 MG: 10 INJECTION INTRAMUSCULAR; INTRAVENOUS at 10:28

## 2024-06-21 RX ADMIN — PHENYLEPHRINE HYDROCHLORIDE 100 MCG: 0.1 INJECTION, SOLUTION INTRAVENOUS at 11:29

## 2024-06-21 RX ADMIN — PHENYLEPHRINE HYDROCHLORIDE 50 MCG: 0.1 INJECTION, SOLUTION INTRAVENOUS at 11:49

## 2024-06-21 RX ADMIN — OXYCODONE HYDROCHLORIDE 5 MG: 5 TABLET ORAL at 21:34

## 2024-06-21 ASSESSMENT — PAIN SCALES - GENERAL
PAINLEVEL_OUTOF10: 5
PAINLEVEL_OUTOF10: 3
PAINLEVEL_OUTOF10: 0
PAINLEVEL_OUTOF10: 0
PAINLEVEL_OUTOF10: 3
PAINLEVEL_OUTOF10: 4
PAINLEVEL_OUTOF10: 0
PAINLEVEL_OUTOF10: 3
PAINLEVEL_OUTOF10: 2
PAINLEVEL_OUTOF10: 0

## 2024-06-21 ASSESSMENT — PAIN DESCRIPTION - DESCRIPTORS
DESCRIPTORS: SORE
DESCRIPTORS: ACHING;DISCOMFORT;SORE

## 2024-06-21 ASSESSMENT — PAIN DESCRIPTION - LOCATION
LOCATION: CHEST
LOCATION: CHEST

## 2024-06-21 ASSESSMENT — PAIN - FUNCTIONAL ASSESSMENT
PAIN_FUNCTIONAL_ASSESSMENT: 0-10
PAIN_FUNCTIONAL_ASSESSMENT: ACTIVITIES ARE NOT PREVENTED
PAIN_FUNCTIONAL_ASSESSMENT: ACTIVITIES ARE NOT PREVENTED

## 2024-06-21 ASSESSMENT — PAIN DESCRIPTION - ORIENTATION
ORIENTATION: RIGHT;LEFT
ORIENTATION: RIGHT;LEFT

## 2024-06-21 NOTE — DISCHARGE INSTRUCTIONS
Activity: Please take it easy for a couple of days after surgery.  Walking is encouraged. The sooner you are up walking the faster your recovery. This will also help prevent constipation and pneumonia. You should avoid lifting, pushing, and/or pulling anything greater than 10 pounds for 2 weeks following surgery (a gallon of milk weighs approximately 8.6 lbs). You may resume work and full activity within 2-4 weeks.     Driving: You may drive when you are no longer taking the narcotic pain medication, can quickly move your foot from gas pedal to the brake and turn the steering wheel with both arms. You must also be able to sit comfortably for a long period of time, even if you do not drive far, you may get caught in traffic!!    Diet: You may resume a regular diet. The prescription for pain medication sometimes may cause nausea. If this happens, eat bland foods such as toast or crackers and sip ginger ale. If nausea is severe, please call our office.    Pain: You may experience some pain in the surgical area. A prescription was written for narcotics at the time of your surgery. You may want to use this medication during the first few days of your recovery when you're most likely to have pain. As your pain lessens use less and less of the narcotic pain medicine and begin the switch to Ibuprofen (Advil) and/or Acetaminophen (Tylenol) as it can cause constipation and nausea.  Cepacol lozenges are available over the counter which can help soothe any throat irritation.     You may have on a Scopolamine patch placed behind your ear for nausea prevention. The patch is effective for 72 hours after placement. This medication may cause dizziness or blurred vision. The patch may be removed prior to 72 hours if nausea is not present. You may peel it off, being sure to wash hands thoroughly after removal.    Care of your incisions: You will have dissolvable stitches and/or Dermabond, which is a type of skin glue, on your breasts

## 2024-06-21 NOTE — ACP (ADVANCE CARE PLANNING)
Advance Care Planning     General Advance Care Planning (ACP) Conversation    Date of Conversation: 6/13/2024  Conducted with: Patient with Decision Making Capacity  Other persons present: Spouse Franklyn Luke    Healthcare Decision Maker:    Primary Decision Maker: Franklyn Luke - Ex-Spouse - [phone]  Click here to complete Healthcare Decision Makers including selection of the Healthcare Decision Maker Relationship (ie \"Primary\").    Content/Action Overview:  Has ACP document(s) on file - reflects the patient's care preferences  Reviewed DNR/DNI and patient elects Full Code (Attempt Resuscitation)    Length of Voluntary ACP Conversation in minutes:  <16 minutes (Non-Billable)    Emely Whittaker

## 2024-06-21 NOTE — ANESTHESIA PROCEDURE NOTES
Peripheral Block    Patient location during procedure: procedure area  Reason for block: post-op pain management and at surgeon's request  Start time: 6/21/2024 9:12 AM  End time: 6/21/2024 9:13 AM  Staffing  Anesthesiologist: Murphy Dimas MD  Performed by: Murphy Dimas MD  Authorized by: Murphy Dimas MD    Peripheral Block   Patient position: supine  Prep: ChloraPrep  Provider prep: sterile gloves  Patient monitoring: cardiac monitor, continuous pulse ox and oxygen  Block type: PECS II  Laterality: left  Injection technique: single-shot  Guidance: ultrasound guided    Needle   Needle type: insulated echogenic nerve stimulator needle   Needle gauge: 22 G  Needle localization: anatomical landmarks and ultrasound guidance  Test dose: negative  Needle length: 8 cm  Assessment   Injection assessment: negative aspiration for heme and local visualized surrounding nerve on ultrasound  Slow fractionated injection: yes  Hemodynamics: stable  Outcomes: uncomplicated    Medications Administered  BUPivacaine 0.25%-EPINEPHrine injection [id number] - Perineural   10 mL - 6/21/2024 9:13:00 AM         1.2

## 2024-06-21 NOTE — PERIOP NOTE
TRANSFER - OUT REPORT:    Verbal report given to DMITRI Webb on Allison Luke  being transferred to [facility] for routine progression of patient care       Report consisted of patient's Situation, Background, Assessment and   Recommendations(SBAR).     Information from the following report(s) Nurse Handoff Report, Surgery Report, Intake/Output, MAR, Recent Results, and Cardiac Rhythm SR  was reviewed with the receiving nurse.           Lines:   Peripheral IV 06/21/24 Left;Posterior Hand (Active)   Site Assessment Clean, dry & intact 06/21/24 1251   Line Status Flushed;Infusing 06/21/24 1251   Line Care Connections checked and tightened 06/21/24 1251   Phlebitis Assessment No symptoms 06/21/24 1251   Infiltration Assessment 0 06/21/24 1251   Alcohol Cap Used No 06/21/24 1251   Dressing Status Clean, dry & intact 06/21/24 1251   Dressing Type Transparent 06/21/24 1251        Opportunity for questions and clarification was provided.      Patient transported with:  O2 @ 2lpm  2 SEBASTIÁN drains

## 2024-06-21 NOTE — ANESTHESIA PROCEDURE NOTES
Peripheral Block    Patient location during procedure: procedure area  Reason for block: post-op pain management and at surgeon's request  Start time: 6/21/2024 9:13 AM  End time: 6/21/2024 9:15 AM  Staffing  Anesthesiologist: Murphy Dimas MD  Performed by: Murphy Dimas MD  Authorized by: Murphy Dimas MD    Peripheral Block   Patient position: supine  Prep: ChloraPrep  Provider prep: sterile gloves  Patient monitoring: cardiac monitor, continuous pulse ox and oxygen  Block type: PECS I and PECS II  Laterality: right  Injection technique: single-shot  Guidance: ultrasound guided    Needle   Needle type: insulated echogenic nerve stimulator needle   Needle gauge: 22 G  Needle localization: anatomical landmarks and ultrasound guidance  Test dose: negative  Needle length: 8 cm  Assessment   Injection assessment: negative aspiration for heme and local visualized surrounding nerve on ultrasound  Slow fractionated injection: yes  Hemodynamics: stable  Outcomes: uncomplicated    Medications Administered  BUPivacaine 0.25%-EPINEPHrine injection [id number] - Perineural   20 mL - 6/21/2024 9:15:00 AM

## 2024-06-21 NOTE — INTERVAL H&P NOTE
Update History & Physical    The patient's History and Physical of June 13, 2024 was reviewed with the patient and I examined the patient. There was no change. The surgical site was confirmed by the patient and me.     Plan: The risks, benefits, expected outcome, and alternative to the recommended procedure have been discussed with the patient. Patient understands and wants to proceed with the procedure.     Electronically signed by Roderick Juárez Jr, MD on 6/21/2024 at 7:43 AM

## 2024-06-21 NOTE — OP NOTE
Operative Note      Patient: Allison Luke  YOB: 1970  MRN: [id number]    Date of Procedure: 6/21/2024    Preop diagnosis: Invasive lobular carcinoma of right breast with axillary luz marina metastasis     Post-Op Diagnosis: Same       Procedure(s):  BREAST MASTECTOMY BILATERAL  RIGHT AXILLARY SENTINEL LYMPH NODE BIOPSY WITH MAGSEED LOCALIZED  EXCISION OF RIGHT AXILLARY NODE Pre-Op    6:30 am  Lympho   8:00 am  OR          9:50 am    Surgeon(s):  Roderick Juárez Jr., MD    Assistant:   * No surgical staff found *    Anesthesia: General    Estimated Blood Loss (mL): 30 mL    Complications: None    Specimens:   ID Type Source Tests Collected by Time Destination   A : Left Breast Tissue Breast SURGICAL PATHOLOGY Roderick Juárez Jr., MD 6/21/2024 1045    B : RIGHT AXILLARY SENTINEL  NODE WITH MAG SEED Tissue Lymph Node SURGICAL PATHOLOGY Roderick Juárez Jr., MD 6/21/2024 1115    C : RIGHT BREAST TISSUE, SINGLE SUTURE CRANIAL, DOUBLE SUTURE LATERAL Tissue Breast SURGICAL PATHOLOGY Roderick Juárez Jr., MD 6/21/2024 1120    D : RIGHT AXILLARY SENTINEL NODES Tissue Lymph Node SURGICAL PATHOLOGY Roderick Juárez Jr., MD 6/21/2024 1122        Implants:  * No implants in log *      Drains:   Closed/Suction Drain Right Breast Bulb (Active)       Closed/Suction Drain Left Breast Bulb (Active)       Findings:  Infection Present At Time Of Surgery (PATOS) (choose all levels that have infection present):  No infection present  Other Findings: Right axillary node with biopsy clip obtained  [place] Node Biopsy for Breast Cancer - Right  Operation performed with curative intent. Yes   Tracer(s) used to identify sentinel nodes in the upfront surgery (non-neoadjuvant) setting (select all that apply). Dye and Radioactive tracer   Tracer(s) used to identify sentinel nodes in the neoadjuvant setting (select all that apply). Dye and Radioactive tracer   All nodes (colored or non-colored) present at the end of a  at the site.  First using the Magseed handpiece for localization I was able to identify the previously biopsied positive right axillary node and remove this.  This node had some radiotracer activity consistent with this being a sentinel node.  X-ray confirmed the biopsy clip and Magseed within the specimen.  This was the only abnormal appearing node on imaging so following this sentinel node biopsy was then performed.  Using the neoprobe the axilla was then examined.  An area of increased tracer activity was identified.  Dissection in this area revealed a hot blue sentinel node that was excised.  Further dissection revealed additional two hot blue nodes that were excised and sent to pathology.  Following this there were no additional palpable nodes no additional blue nodes and no additional tracer activity in the axilla.  Hemostasis was achieved at the site.  Local anesthetic was then infiltrated in the subpectoral region.  A 19 Salv[place]orean round drain was placed through a small stab incision inferiorly and secured to the skin with a 2-0 nylon suture.  It was coiled within the mastectomy space.  After ensuring hemostasis the skin was reapproximated loosely with skin staples.  The dermis was then closed with interrupted 3-0 Vicryl sutures.  The skin staples were removed.  The skin was then closed using a double-armed 2.0  strata fix suture in a running fashion.  After thoroughly cleaning the site Dermabond was applied.  She tolerated the procedure well without complications.  Lap and instrument count at the completion of the procedure was correct x2.  Estimated blood loss was 30 mL.  Patient condition at the completion was stable and she was awoken from [facility] and transported to recovery in stable condition.     Electronically signed by Roderick Juárez Jr, MD on 6/21/2024 at 12:18 PM

## 2024-06-21 NOTE — ANESTHESIA PROCEDURE NOTES
Peripheral Block    Patient location during procedure: procedure area  Reason for block: post-op pain management and at surgeon's request  Start time: 6/21/2024 9:07 AM  End time: 6/21/2024 9:12 AM  Staffing  Anesthesiologist: Murphy Dimas MD  Performed by: Murphy Dimas MD  Authorized by: Murphy Dimas MD    Peripheral Block   Patient position: supine  Prep: ChloraPrep  Provider prep: sterile gloves  Patient monitoring: cardiac monitor, continuous pulse ox and oxygen  Block type: PECS II  Laterality: left  Injection technique: single-shot  Guidance: ultrasound guided    Needle   Needle type: insulated echogenic nerve stimulator needle   Needle gauge: 22 G  Needle localization: anatomical landmarks and ultrasound guidance  Test dose: negative  Needle length: 8 cm  Assessment   Injection assessment: negative aspiration for heme and local visualized surrounding nerve on ultrasound  Slow fractionated injection: yes  Hemodynamics: stable  Outcomes: uncomplicated    Medications Administered  BUPivacaine 0.25%-EPINEPHrine injection [id number] - Perineural   20 mL - 6/21/2024 9:12:00 AM

## 2024-06-21 NOTE — ANESTHESIA PROCEDURE NOTES
Peripheral Block    Patient location during procedure: procedure area  Reason for block: post-op pain management and at surgeon's request  Start time: 6/21/2024 9:15 AM  End time: 6/21/2024 9:17 AM  Staffing  Anesthesiologist: Murphy Dimas MD  Performed by: Murphy Dimas MD  Authorized by: Murphy Dimas MD    Peripheral Block   Patient position: supine  Prep: ChloraPrep  Provider prep: sterile gloves  Patient monitoring: cardiac monitor, continuous pulse ox and oxygen  Block type: PECS I and PECS II  Laterality: right  Injection technique: single-shot  Guidance: ultrasound guided    Needle   Needle type: insulated echogenic nerve stimulator needle   Needle gauge: 22 G  Needle localization: anatomical landmarks and ultrasound guidance  Test dose: negative  Needle length: 8 cm  Assessment   Injection assessment: negative aspiration for heme and local visualized surrounding nerve on ultrasound  Slow fractionated injection: yes  Hemodynamics: stable  Outcomes: uncomplicated    Medications Administered  BUPivacaine 0.25%-EPINEPHrine injection 1:[id number] - Perineural   10 mL - 6/21/2024 9:17:00 AM

## 2024-06-21 NOTE — ANESTHESIA PRE PROCEDURE
\"COVID19\"        Anesthesia Evaluation  Patient summary reviewed and Nursing notes reviewed  Airway: Mallampati: II  TM distance: >3 FB   Neck ROM: full     Dental:          Pulmonary:Negative Pulmonary ROS and normal exam    (+)     sleep apnea: on noncompliant,                                  Cardiovascular:  Exercise tolerance: good (>4 METS)  (+) hypertension:        Rhythm: regular  Rate: normal                    Neuro/Psych:   Negative Neuro/Psych ROS              GI/Hepatic/Renal: Neg GI/Hepatic/Renal ROS            Endo/Other: Negative Endo/Other ROS             Pt had no PAT visit       Abdominal:             Vascular: negative vascular ROS.         Other Findings:       Anesthesia Plan      general     ASA 2     (Pecs 1 AND 2)  Induction: intravenous.    MIPS: Postoperative opioids intended and Prophylactic antiemetics administered.  Anesthetic plan and risks discussed with patient.      Plan discussed with surgical team.                NICOLETTE COTO MD   6/21/2024

## 2024-06-21 NOTE — BRIEF OP NOTE
Brief Postoperative Note      Patient: Allison Luke  YOB: 1970  MRN: [id number]    Date of Procedure: 6/21/2024    All preop diagnosis: Invasive lobular carcinoma of right breast with axillary luz marina metastasis    Post-Op Diagnosis: Same       Procedure(s):  BREAST MASTECTOMY BILATERAL  RIGHT AXILLARY SENTINEL LYMPH NODE BIOPSY WITH MAGSEED LOCALIZED  EXCISION OF RIGHT AXILLARY NODE Pre-Op    6:30 am  Lympho   8:00 am  OR          9:50 am    Surgeon(s):  Roderick Juárez Jr., MD    Assistant:  * No surgical staff found *    Anesthesia: General    Estimated Blood Loss (mL): 30 mL    Complications: None    Specimens:   ID Type Source Tests Collected by Time Destination   A : Left Breast Tissue Breast SURGICAL PATHOLOGY Roderick Juárez Jr., MD 6/21/2024 1045    B : RIGHT AXILLARY SENTINEL  NODE WITH MAG SEED Tissue Lymph Node SURGICAL PATHOLOGY Roderick Juárez Jr., MD 6/21/2024 1115    C : RIGHT BREAST TISSUE, SINGLE SUTURE CRANIAL, DOUBLE SUTURE LATERAL Tissue Breast SURGICAL PATHOLOGY Roderick Juárez Jr., MD 6/21/2024 1120    D : RIGHT AXILLARY SENTINEL NODES Tissue Lymph Node SURGICAL PATHOLOGY Roderick Juárez Jr., MD 6/21/2024 1122        Implants:  * No implants in log *      Drains:   Closed/Suction Drain Right Breast Bulb (Active)       Closed/Suction Drain Left Breast Bulb (Active)       Findings:  Infection Present At Time Of Surgery (PATOS) (choose all levels that have infection present):  No infection present  Other Findings: Right axillary node with biopsy clip obtained  [place] Node Biopsy for Breast Cancer - Right  Operation performed with curative intent. Yes   Tracer(s) used to identify sentinel nodes in the upfront surgery (non-neoadjuvant) setting (select all that apply). Dye and Radioactive tracer   Tracer(s) used to identify sentinel nodes in the neoadjuvant setting (select all that apply). Dye and Radioactive tracer   All nodes (colored or non-colored) present at the

## 2024-06-21 NOTE — PROGRESS NOTES
4 Eyes Skin Assessment     NAME:  Allison Luke  YOB: 1970  MEDICAL RECORD NUMBER:  [id number]    The patient is being assessed for  Admission    I agree that at least one RN has performed a thorough Head to Toe Skin Assessment on the patient. ALL assessment sites listed below have been assessed.      Areas assessed by both nurses:    Head, Face, Ears, Shoulders, Back, Chest, Arms, Elbows, Hands, Sacrum. Buttock, Coccyx, Ischium, Legs. Feet and Heels, and Under Medical Devices         Does the Patient have a Wound? No noted wound(s)       Elian Prevention initiated by RN: Yes  Wound Care Orders initiated by RN: No    Pressure Injury (Stage 3,4, Unstageable, DTI, NWPT, and Complex wounds) if present, place Wound referral order by RN under : No    New Ostomies, if present place, Ostomy referral order under : No     Nurse 1 eSignature: Electronically signed by brielle brock RN on 6/21/24 at 3:39 PM EDT    **SHARE this note so that the co-signing nurse can place an eSignature**    Nurse 2 eSignature: {Esignature:[id number]}

## 2024-06-21 NOTE — CARE COORDINATION
Met with Ms. Luke at bedside for initial CM assessment. Patient is alert and oriented x4. Demographics and PCP verified. Ms. Luke last saw her PCP in May for her yearly physical. She lives with her spouse in a one level home with 6 steps to enter. She was independent with mobility and ADLs at most recent baseline and used no DME or services. Ms. Luke works full-time and remains an active  in the community. She plans to return home with spouse and anticipates no needs from CM at this time. CM will continue to follow and assist with transition at discharge along with any needs that may arise during her stay.       06/21/24 1405   Service Assessment   Patient Orientation Alert and Oriented;Person;Place;Situation   Cognition Alert   History Provided By Patient   Primary Caregiver Self   Accompanied By/Relationship Family   Support Systems Spouse/Significant Other;Family Members;Friends/Neighbors   Patient's Healthcare Decision Maker is: Legal Next of Kin   PCP Verified by CM Yes   Last Visit to PCP Within last 3 months   Prior Functional Level Independent in ADLs/IADLs   Current Functional Level Independent in ADLs/IADLs   Can patient return to prior living arrangement Yes   Ability to make needs known: Good   Family able to assist with home care needs: Yes   Would you like for me to discuss the discharge plan with any other family members/significant others, and if so, who? Yes  (Franklyn Luke)   Financial Resources None   Community Resources None   CM/SW Referral Other (see comment)  (standard assessment)   Social/Functional History   Lives With Spouse   Type of Home House   Home Layout One level   Home Access Stairs to enter with rails   Entrance Stairs - Number of Steps 6   Entrance Stairs - Rails Both   Bathroom Shower/Tub Walk-in shower   Bathroom Toilet Standard   Bathroom Equipment Built-in shower seat   Bathroom Accessibility Accessible   Home Equipment None   Receives Help From Other

## 2024-06-21 NOTE — ANESTHESIA POSTPROCEDURE EVALUATION
Department of Anesthesiology  Postprocedure Note    Patient: Allison Luke  MRN: [id number]  YOB: 1970  Date of evaluation: 6/21/2024    Procedure Summary       Date: 06/21/24 Room / Location: [facility] MAIN OR  / [facility] MAIN OR    Anesthesia Start: 0957 Anesthesia Stop: 1220    Procedures:       BREAST MASTECTOMY BILATERAL (Bilateral)      RIGHT AXILLARY SENTINEL LYMPH NODE BIOPSY WITH MAGSEED LOCALIZED  EXCISION OF RIGHT AXILLARY NODE Pre-Op    6:30 am  Lympho   8:00 am  OR          9:50 am (Right) Diagnosis:       Lobular carcinoma in situ of right breast      Prophylactic breast removal      (Lobular carcinoma in situ of right breast [D05.01])      (Prophylactic breast removal [Z40.01])    Surgeons: Roderick Juárez Jr., MD Responsible Provider: Cb Hughes MD    Anesthesia Type: general ASA Status: 2            Anesthesia Type: No value filed.    Mojgan Phase I: Mojgan Score: 8    Mojgan Phase II:      Anesthesia Post Evaluation    Patient location during evaluation: PACU  Patient participation: complete - patient participated  Level of consciousness: awake and alert  Pain score: 2  Airway patency: patent  Nausea & Vomiting: no nausea  Cardiovascular status: blood pressure returned to baseline and hemodynamically stable  Respiratory status: acceptable  Hydration status: euvolemic  Multimodal analgesia pain management approach  Pain management: adequate and satisfactory to patient    No notable events documented.

## 2024-06-21 NOTE — PROGRESS NOTES
TRANSFER - IN REPORT:    Verbal report received from García higgins Allison Luke  being received from PACU for routine post-op      Report consisted of patient's Situation, Background, Assessment and   Recommendations(SBAR).     Information from the following report(s) Nurse Handoff Report was reviewed with the receiving nurse.    Opportunity for questions and clarification was provided.      Assessment completed upon patient's arrival to unit and care assumed.

## 2024-06-22 VITALS
TEMPERATURE: 98.4 F | HEART RATE: 80 BPM | HEIGHT: 61 IN | SYSTOLIC BLOOD PRESSURE: 123 MMHG | RESPIRATION RATE: 17 BRPM | BODY MASS INDEX: 34.4 KG/M2 | WEIGHT: 182.2 LBS | OXYGEN SATURATION: 94 % | DIASTOLIC BLOOD PRESSURE: 89 MMHG

## 2024-06-22 PROCEDURE — 94760 N-INVAS EAR/PLS OXIMETRY 1: CPT

## 2024-06-22 PROCEDURE — G0378 HOSPITAL OBSERVATION PER HR: HCPCS

## 2024-06-22 PROCEDURE — 6370000000 HC RX 637 (ALT 250 FOR IP): Performed by: SURGERY

## 2024-06-22 RX ORDER — MIRABEGRON 25 MG/1
50 TABLET, FILM COATED, EXTENDED RELEASE ORAL EVERY MORNING
Status: DISCONTINUED | OUTPATIENT
Start: 2024-06-22 | End: 2024-06-22 | Stop reason: HOSPADM

## 2024-06-22 RX ADMIN — VALSARTAN 80 MG: 40 TABLET, FILM COATED ORAL at 08:17

## 2024-06-22 RX ADMIN — MIRABEGRON 50 MG: 25 TABLET, FILM COATED, EXTENDED RELEASE ORAL at 08:16

## 2024-06-22 RX ADMIN — ACETAMINOPHEN 650 MG: 325 TABLET, FILM COATED ORAL at 08:17

## 2024-06-22 RX ADMIN — PREGABALIN 50 MG: 50 CAPSULE ORAL at 08:17

## 2024-06-22 RX ADMIN — OXYCODONE HYDROCHLORIDE 5 MG: 5 TABLET ORAL at 12:14

## 2024-06-22 ASSESSMENT — PAIN SCALES - GENERAL
PAINLEVEL_OUTOF10: 0
PAINLEVEL_OUTOF10: 4

## 2024-06-22 NOTE — PLAN OF CARE
Problem: Pain  Goal: Verbalizes/displays adequate comfort level or baseline comfort level  Outcome: Progressing     Problem: Discharge Planning  Goal: Discharge to home or other facility with appropriate resources  Outcome: Progressing  Flowsheets (Taken 6/21/2024 1318 by Tracey Palomo, RN)  Discharge to home or other facility with appropriate resources: Identify barriers to discharge with patient and caregiver     Problem: Safety - Adult  Goal: Free from fall injury  Outcome: Progressing     Problem: ABCDS Injury Assessment  Goal: Absence of physical injury  Outcome: Progressing

## 2024-06-22 NOTE — PLAN OF CARE
Problem: Pain  Goal: Verbalizes/displays adequate comfort level or baseline comfort level  6/22/2024 1004 by Sharon Abad RN  Outcome: Progressing  6/21/2024 2352 by Fouzia Greco RN  Outcome: Progressing     Problem: Discharge Planning  Goal: Discharge to home or other facility with appropriate resources  6/22/2024 1004 by Sharon Abad RN  Outcome: Progressing  6/21/2024 2352 by Fouzia Greco RN  Outcome: Progressing  Flowsheets (Taken 6/21/2024 1318 by Tracey Palomo, RN)  Discharge to home or other facility with appropriate resources: Identify barriers to discharge with patient and caregiver     Problem: Safety - Adult  Goal: Free from fall injury  6/22/2024 1004 by Sharon Abad RN  Outcome: Progressing  6/21/2024 2352 by Fouzia Greco RN  Outcome: Progressing     Problem: ABCDS Injury Assessment  Goal: Absence of physical injury  6/22/2024 1004 by Sharon Abad RN  Outcome: Progressing  6/21/2024 2352 by Fouzia Greco RN  Outcome: Progressing

## 2024-06-22 NOTE — PROGRESS NOTES
Patient discharged to home with family at side. Patients IV discontinued. Patient given AVS with opportunities for questions provided. Patient verbalized understanding with no further questions or concerns noted.

## 2024-06-22 NOTE — DISCHARGE SUMMARY
Cb West MD   General and Robotic surgery  [address] [place] [address]  Phone [phone]   Fax [phone]     Discharge Summary     Allison Luke  MRN: [id number]     : 1970     Age: 53 y.o.                          Admit date: 2024     Discharge date: 24         Attending Physician: Cb West MD, MD, [facility]  Primary Discharge Diagnosis:   Principal Problem:    Breast cancer in female (HCC)  Active Problems:    Lobular carcinoma in situ of right breast    Prophylactic breast removal  Resolved Problems:    * No resolved hospital problems. *    Primary Operations or Procedures Performed :  Procedure(s):  BREAST MASTECTOMY BILATERAL  RIGHT AXILLARY SENTINEL LYMPH NODE BIOPSY WITH MAGSEED LOCALIZED  EXCISION OF RIGHT AXILLARY NODE Pre-Op    6:30 am  Lympho   8:00 am  OR          9:50 am     Hospital course: Allison Luke is a 53 y.o. female who underwent bilateral mastectomy and R SLNB with Dr. Juárez.  Her pain is well controlled.  Tolerating PO and ambulating.  Dressings in place, drains serosang.  Home with drains today and follow up with Dr. Juárez next week.      Condition at Discharge: good    Discharge Medications:   Current Discharge Medication List        START taking these medications    Details   oxyCODONE (ROXICODONE) 5 MG immediate release tablet Take 1 tablet by mouth every 6 hours as needed for Pain for up to 5 days. Intended supply: 5 days. Take lowest dose possible to manage pain Max Daily Amount: 20 mg  Qty: 20 tablet, Refills: 0    Comments: Reduce doses taken as pain becomes manageable  Associated Diagnoses: Malignant neoplasm of upper-outer quadrant of right breast in female, estrogen receptor positive (HCC)           CONTINUE these medications which have NOT CHANGED    Details   valsartan (DIOVAN) 80 MG tablet Take 1 tablet by mouth daily  Qty: 90 tablet, Refills: 3    Associated Diagnoses: Primary hypertension

## 2024-06-24 ENCOUNTER — CARE COORDINATION (OUTPATIENT)
Dept: CARE COORDINATION | Facility: CLINIC | Age: 54
End: 2024-06-24

## 2024-06-24 NOTE — CARE COORDINATION
Care Transitions Note    Initial Call - Call within 2 business days of discharge: Yes    Patient Current Location:  Home: [address]  [facility] [address]    Care Transition Nurse contacted the patient by telephone to perform post hospital discharge assessment, verified name and  as identifiers. Provided introduction to self, and explanation of the Care Transition Nurse role.     Patient: Allison Luke    Patient : 1970   MRN: [id number]    Reason for Admission: OBS/Breast Cancer  Procedure(s):  BREAST MASTECTOMY BILATERAL  RIGHT AXILLARY SENTINEL LYMPH NODE BIOPSY WITH MAGSEED LOCALIZED  EXCISION OF RIGHT AXILLARY NODE  Discharge Date: 24  RURS: No data recorded    Last Discharge Facility       Date Complaint Diagnosis Description Type Department Provider    24  Malignant neoplasm of upper-outer quadrant of right breast in female, estrogen receptor positive (HCC) ... Admission (Discharged) Roderick Meyers Jr., MD            Was this an external facility discharge? No    Additional needs identified to be addressed with provider   No needs identified             Method of communication with provider: none.    Patients top risk factors for readmission:   Reviewed hospital discharge instructions  Confirmed medications obtained and taking as ordered.  Patient has only taken pain medications X 1 and ans on hand if needed  Confirmed [facility] follow up appointment 2024 at 11:20 am. Patient voiced no issues at time of call and agrees to contact CTN with any prior to next outreach.    Interventions to address risk factors:   See above note    Care Transition Nurse reviewed discharge instructions, medical action plan, and red flags with patient. The patient was given an opportunity to ask questions; no further questions or concerns at this time.. The patient verbalized understanding.   Were discharge instructions available to patient? Yes.   Reviewed appropriate site of care

## 2024-06-25 ENCOUNTER — CLINICAL DOCUMENTATION (OUTPATIENT)
Dept: ONCOLOGY | Age: 54
End: 2024-06-25

## 2024-06-25 ENCOUNTER — TELEPHONE (OUTPATIENT)
Dept: ONCOLOGY | Age: 54
End: 2024-06-25

## 2024-06-25 NOTE — TELEPHONE ENCOUNTER
Faxed paperwork for life insurance to [employer] to [phone] and faxed to [facility] at [phone]. Patient informed.

## 2024-06-25 NOTE — PROGRESS NOTES
Oncotype DX submitted.  Next planned outreach is 6-27-24 TB, and 7-19-24 follow up after post surgery visit with Dr. Hilario to review plan of care.

## 2024-06-27 ENCOUNTER — CLINICAL DOCUMENTATION (OUTPATIENT)
Dept: ONCOLOGY | Age: 54
End: 2024-06-27

## 2024-06-27 NOTE — PROGRESS NOTES
Presented at Multidisciplinary Breast Conference today 6/27/2024.Next planned outreach will be 7-19-24 for plan of care review after follow up with Dr. Hilario.

## 2024-07-01 ENCOUNTER — CARE COORDINATION (OUTPATIENT)
Dept: CARE COORDINATION | Facility: CLINIC | Age: 54
End: 2024-07-01

## 2024-07-01 NOTE — CARE COORDINATION
Care Transitions Note    Follow Up Call     Patient Current Location:  Home: [address]  [facility] [address]    LPN Care Coordinator contacted the patient by telephone. Verified name and  as identifiers.  Patient is engaged with Oncology RN Navigator.  Patient verified having RN Navigator's contact information.    Provided contact information for this LPN CC as well as Neeta Davidson RN CTN.    Future Appointments         Provider Specialty Dept Phone    2024 11:20 AM Roderick Juárez Jr., MD General Surgery [phone]    2024 7:20 AM PERIPHERAL Lab [phone]    2024 8:15 AM Ines Hilario MD Oncology [phone]    2024 10:00 AM PST LAB Family Medicine [phone]    2024 9:20 AM Berto Daigle DO [facility] [phone]    2024 10:00 AM Maryann New Oncology [phone]    2024 9:15 AM [facility] ROOM 3 Radiology [phone]    2024 3:00 PM PERIPHERAL Lab [phone]    2024 4:00 PM Ines Hilario MD Oncology [phone]    2025 9:00 AM PST LAB Family Medicine [phone]    2025 8:40 AM Berto Daigle DO [facility] [phone]            Patient closed (declined further ADAIR services) from the Care Transitions program on 24.    Handoff:   Patient was not referred to the ACM team due to patient declined services.      Patient has agreed to contact primary care provider and/or specialist for any further questions, concerns, or needs.    Emma Mendez LPN

## 2024-07-02 ENCOUNTER — OFFICE VISIT (OUTPATIENT)
Dept: SURGERY | Age: 54
End: 2024-07-02

## 2024-07-02 DIAGNOSIS — C77.3 PRIMARY MALIGNANT NEOPLASM OF RIGHT BREAST WITH METASTASIS TO MOVABLE IPSILATERAL LEVEL 1 OR 2 AXILLARY LYMPH NODES (N1) (HCC): Primary | ICD-10-CM

## 2024-07-02 DIAGNOSIS — C50.911 PRIMARY MALIGNANT NEOPLASM OF RIGHT BREAST WITH METASTASIS TO MOVABLE IPSILATERAL LEVEL 1 OR 2 AXILLARY LYMPH NODES (N1) (HCC): Primary | ICD-10-CM

## 2024-07-02 PROCEDURE — 99024 POSTOP FOLLOW-UP VISIT: CPT | Performed by: SURGERY

## 2024-07-02 ASSESSMENT — ENCOUNTER SYMPTOMS
GASTROINTESTINAL NEGATIVE: 1
ALLERGIC/IMMUNOLOGIC NEGATIVE: 1
EYES NEGATIVE: 1
RESPIRATORY NEGATIVE: 1

## 2024-07-02 NOTE — PROGRESS NOTES
7/2/2024    Allison Luke  MRN: [id number]      CHIEF COMPLAINT: Doing well      PRIMARY CARE PHYSICIAN: Berto Daigle DO      HISTORY:  Underwent bilateral mastectomy for invasive lobular carcinoma on the right.  Underwent excision of previously biopsied right axillary node which was a sentinel node along with a sentinel node biopsy.  She is doing well after surgery.  Date Obtained:   6/21/2024   DIAGNOSIS       A:  \"LEFT BREAST\":  BENIGN BREAST TISSUE; FIBROCYSTIC MASTOPATHY.        B:  \"RIGHT AXILLARY SENTINEL NODE\":  METASTATIC CARCINOMA, 12   MILLIMETERS IN GREATEST DIMENSION, NEGATIVE FOR EXTRACAPSULAR EXTENSION,   INVOLVING 1 OF 2 LYMPH NODES (1/2).         C:  \"RIGHT BREAST\":  INVASIVE LOBULAR CARCINOMA, APPROXIMATELY 10   MILLIMETERS IN GREATEST DIMENSION WITH ASSOCIATED LOBULAR CARCINOMA IN   SITU, MARGINS UNINVOLVED.  RESIDUAL LOBULAR CARCINOMA IN SITU ASSOCIATED   WITH THE SECOND BIOPSY SITE, MARGINS UNINVOLVED.        D:  \"RIGHT AXILLARY SENTINEL NODES\":  METASTATIC CARCINOMA INVOLVING   1 OF 4 LYMPH NODES (1/4).   pT1b pN1a       REVIEW OF SYSTEMS:  Review of Systems   Constitutional: Negative.    HENT: Negative.     Eyes: Negative.    Respiratory: Negative.     Cardiovascular: Negative.    Gastrointestinal: Negative.    Endocrine: Negative.    Genitourinary: Negative.    Musculoskeletal: Negative.    Skin: Negative.    Allergic/Immunologic: Negative.    Neurological: Negative.    Hematological: Negative.    Psychiatric/Behavioral: Negative.            Past Medical History:   Diagnosis Date    Atypical ductal hyperplasia of right breast     per pt benign    Breast cancer (HCC)     Chronic pain     R knee    Colitis     Depression     HTN (hypertension)     managed with med    Sleep apnea     unable to tolerate cpap machine    Ulcerative colitis (HCC)     not currently on medication       Current Outpatient Medications   Medication Sig Dispense Refill    valsartan (DIOVAN) 80 MG

## 2024-07-09 DIAGNOSIS — C50.912 LOBULAR CARCINOMA OF LEFT BREAST (HCC): Primary | ICD-10-CM

## 2024-07-18 ENCOUNTER — HOSPITAL ENCOUNTER (OUTPATIENT)
Dept: LAB | Age: 54
Discharge: HOME | End: 2024-07-18
Payer: COMMERCIAL

## 2024-07-18 ENCOUNTER — OFFICE VISIT (OUTPATIENT)
Dept: ONCOLOGY | Age: 54
End: 2024-07-18
Payer: COMMERCIAL

## 2024-07-18 VITALS
RESPIRATION RATE: 16 BRPM | DIASTOLIC BLOOD PRESSURE: 89 MMHG | BODY MASS INDEX: 35.3 KG/M2 | OXYGEN SATURATION: 94 % | TEMPERATURE: 97.8 F | SYSTOLIC BLOOD PRESSURE: 170 MMHG | WEIGHT: 187 LBS | HEART RATE: 90 BPM | HEIGHT: 61 IN

## 2024-07-18 DIAGNOSIS — Z17.0 MALIGNANT NEOPLASM OF UPPER-OUTER QUADRANT OF RIGHT BREAST IN FEMALE, ESTROGEN RECEPTOR POSITIVE (HCC): Primary | ICD-10-CM

## 2024-07-18 DIAGNOSIS — Z13.29 SCREENING FOR HYPOTHYROIDISM: ICD-10-CM

## 2024-07-18 DIAGNOSIS — C50.912 LOBULAR CARCINOMA OF LEFT BREAST (HCC): ICD-10-CM

## 2024-07-18 DIAGNOSIS — C50.411 MALIGNANT NEOPLASM OF UPPER-OUTER QUADRANT OF RIGHT BREAST IN FEMALE, ESTROGEN RECEPTOR POSITIVE (HCC): Primary | ICD-10-CM

## 2024-07-18 DIAGNOSIS — Z90.13 HISTORY OF BILATERAL MASTECTOMY: ICD-10-CM

## 2024-07-18 DIAGNOSIS — I89.0 LYMPHEDEMA: ICD-10-CM

## 2024-07-18 LAB
ALBUMIN SERPL-MCNC: 3.4 G/DL (ref 3.5–5)
ALBUMIN/GLOB SERPL: 1.1 (ref 1–1.9)
ALP SERPL-CCNC: 57 U/L (ref 35–104)
ALT SERPL-CCNC: 22 U/L (ref 12–65)
ANION GAP SERPL CALC-SCNC: 11 MMOL/L (ref 9–18)
AST SERPL-CCNC: 20 U/L (ref 15–37)
BASOPHILS # BLD: 0.1 K/UL (ref 0–0.2)
BASOPHILS NFR BLD: 1 % (ref 0–2)
BILIRUB SERPL-MCNC: 0.5 MG/DL (ref 0–1.2)
BUN SERPL-MCNC: 16 MG/DL (ref 6–23)
CALCIUM SERPL-MCNC: 9.1 MG/DL (ref 8.8–10.2)
CHLORIDE SERPL-SCNC: 103 MMOL/L (ref 98–107)
CO2 SERPL-SCNC: 27 MMOL/L (ref 20–28)
CREAT SERPL-MCNC: 0.71 MG/DL (ref 0.6–1.1)
DIFFERENTIAL METHOD BLD: NORMAL
EOSINOPHIL # BLD: 0.2 K/UL (ref 0–0.8)
EOSINOPHIL NFR BLD: 3 % (ref 0.5–7.8)
ERYTHROCYTE [DISTWIDTH] IN BLOOD BY AUTOMATED COUNT: 13.8 % (ref 11.9–14.6)
ESTRADIOL SERPL-MCNC: 187 PG/ML
FSH SERPL-ACNC: 10.3 MIU/ML
GLOBULIN SER CALC-MCNC: 3.2 G/DL (ref 2.3–3.5)
GLUCOSE SERPL-MCNC: 92 MG/DL (ref 70–99)
HCT VFR BLD AUTO: 42.3 % (ref 35.8–46.3)
HGB BLD-MCNC: 14.1 G/DL (ref 11.7–15.4)
IMM GRANULOCYTES # BLD AUTO: 0.1 K/UL (ref 0–0.5)
IMM GRANULOCYTES NFR BLD AUTO: 1 % (ref 0–5)
LH SERPL-ACNC: 6.2 MIU/ML
LYMPHOCYTES # BLD: 2 K/UL (ref 0.5–4.6)
LYMPHOCYTES NFR BLD: 30 % (ref 13–44)
MCH RBC QN AUTO: 32.1 PG (ref 26.1–32.9)
MCHC RBC AUTO-ENTMCNC: 33.3 G/DL (ref 31.4–35)
MCV RBC AUTO: 96.4 FL (ref 82–102)
MONOCYTES # BLD: 0.6 K/UL (ref 0.1–1.3)
MONOCYTES NFR BLD: 9 % (ref 4–12)
NEUTS SEG # BLD: 3.8 K/UL (ref 1.7–8.2)
NEUTS SEG NFR BLD: 56 % (ref 43–78)
NRBC # BLD: 0 K/UL (ref 0–0.2)
PLATELET # BLD AUTO: 243 K/UL (ref 150–450)
PMV BLD AUTO: 11.1 FL (ref 9.4–12.3)
POTASSIUM SERPL-SCNC: 3.5 MMOL/L (ref 3.5–5.1)
PROT SERPL-MCNC: 6.7 G/DL (ref 6.3–8.2)
RBC # BLD AUTO: 4.39 M/UL (ref 4.05–5.2)
SODIUM SERPL-SCNC: 141 MMOL/L (ref 136–145)
T4 FREE SERPL-MCNC: 1 NG/DL (ref 0.9–1.7)
T4 FREE SERPL-MCNC: 1.1 NG/DL (ref 0.9–1.7)
TSH W FREE THYROID IF ABNORMAL: 5.68 UIU/ML (ref 0.27–4.2)
TSH W FREE THYROID IF ABNORMAL: 5.84 UIU/ML (ref 0.27–4.2)
WBC # BLD AUTO: 6.6 K/UL (ref 4.3–11.1)

## 2024-07-18 PROCEDURE — 84443 ASSAY THYROID STIM HORMONE: CPT

## 2024-07-18 PROCEDURE — 85025 COMPLETE CBC W/AUTO DIFF WBC: CPT

## 2024-07-18 PROCEDURE — 99215 OFFICE O/P EST HI 40 MIN: CPT | Performed by: INTERNAL MEDICINE

## 2024-07-18 PROCEDURE — 3077F SYST BP >= 140 MM HG: CPT | Performed by: INTERNAL MEDICINE

## 2024-07-18 PROCEDURE — 82670 ASSAY OF TOTAL ESTRADIOL: CPT

## 2024-07-18 PROCEDURE — 36415 COLL VENOUS BLD VENIPUNCTURE: CPT

## 2024-07-18 PROCEDURE — 83001 ASSAY OF GONADOTROPIN (FSH): CPT

## 2024-07-18 PROCEDURE — 84439 ASSAY OF FREE THYROXINE: CPT

## 2024-07-18 PROCEDURE — 3079F DIAST BP 80-89 MM HG: CPT | Performed by: INTERNAL MEDICINE

## 2024-07-18 PROCEDURE — 83002 ASSAY OF GONADOTROPIN (LH): CPT

## 2024-07-18 PROCEDURE — 80053 COMPREHEN METABOLIC PANEL: CPT

## 2024-07-18 RX ORDER — PREGABALIN 75 MG/1
CAPSULE ORAL
COMMUNITY

## 2024-07-18 NOTE — PROGRESS NOTES
[facility] Hematology and Oncology: Established patient - follow up     Chief Complaint   Patient presents with    Follow-up     Referral Diagnosis: Neoplasm of right breast, primary tumor staging category Tis: lobular carcinoma in situ (LCIS)  Referring Provider: Roderick Juárez Jr., MD  Primary Care Provider: Berto Daigle DO  Family History of Cancer/ Hematology Disorders: None reported   Presenting Symptoms: Abnormal routine screening mammogram     History of Present Illness:  Ms. Luke is a 54 y.o. female who presents today for follow up regarding LCIS.  The past medical history is below.    - At consultation, we discussed the pathophysiology of breast cancer, staging, and the importance of receptor status in terms of treatment options.  We then reviewed her medical history as well as oncologic history, recent imaging and pathology in detail.  We discussed role of chemoprevention and risks associated with LCIS.    - follow-up.  Interim events noted.  At last appointment abnormality was palpated and patient was sent for imaging which confirmed need for bx.  Path c/w lobular invasive disease.  She is scheduled for mastectomy on 6/20.  Will proceed with CT CAP as a stat prior to surgery tomorrow.  She is in agreement with this plan and wishes to know baseline prior to proceeding with surgery.  No other issues or concerns at this time.  She has made peace with the decision of pursuing mastectomies.  Pathology reviewed in detail.  She was here with her daughter and .  We discussed the pathology and pathophysiology and staging of breast cancer.  We discussed the role of endocrine therapy as well as chemotherapy in the future.  Role of Oncotype DX discussed and it's predictive/prognostic value.  No other issues or concerns at this time.    Today, patient is here for follow-up after bilateral mastectomies.  She tolerated the surgery very well and is healing nicely.  Mild seroma noted over left chest wall

## 2024-07-18 NOTE — PATIENT INSTRUCTIONS
Patient Information from Today's Visit    The members of your Oncology Medical Home are listed below:    Physician Provider: Ines Hilario, Medical Oncologist  Advanced Practice Clinician: Marianna Hair NP  Registered Nurse: Gerda OATES RN  Navigator: Carol RAMIREZ RN, Tasha Mooney RN  Medical Assistant: Jenise ANTONIO MA  : Rani ALANIS   Supportive Care Services: Suhail LEOS LMSW    Diagnosis: Breast Cancer      Follow Up Instructions: 3 months    Surgery and Oncotype reviewed. You do not need chemotherapy. We will refer you to radiation oncology. We will also recommend a pill to lower your estrogen levels to decrease your risk of recurrence. We will discuss this further after radiation.    Treatment Summary has been discussed and given to patient:N/A      Current Labs:   Hospital Outpatient Visit on 07/18/2024   Component Date Value Ref Range Status    Sodium 07/18/2024 141  136 - 145 mmol/L Final    Potassium 07/18/2024 3.5  3.5 - 5.1 mmol/L Final    Chloride 07/18/2024 103  98 - 107 mmol/L Final    CO2 07/18/2024 27  20 - 28 mmol/L Final    Anion Gap 07/18/2024 11  9 - 18 mmol/L Final    Glucose 07/18/2024 92  70 - 99 mg/dL Final    Comment: <70 mg/dL Consistent with, but not fully diagnostic of hypoglycemia.  100 - 125 mg/dL Impaired fasting glucose/consistent with pre-diabetes mellitus.  > 126 mg/dl Fasting glucose consistent with overt diabetes mellitus      BUN 07/18/2024 16  6 - 23 MG/DL Final    Creatinine 07/18/2024 0.71  0.60 - 1.10 MG/DL Final    Est, Glom Filt Rate 07/18/2024 >90  >60 ml/min/1.73m2 Final    Comment:    Pediatric calculator link: https://www.kidney.org/professionals/kdoqi/gfr_calculatorped     These results are not intended for use in patients <18 years of age.     eGFR results are calculated without a race factor using  the 2021 CKD-EPI equation. Careful clinical correlation is recommended, particularly when comparing to results calculated using previous equations.  The CKD-EPI

## 2024-07-19 ENCOUNTER — CLINICAL DOCUMENTATION (OUTPATIENT)
Dept: ONCOLOGY | Age: 54
End: 2024-07-19

## 2024-07-19 NOTE — PROGRESS NOTES
Follow up on plan of care.  Patient will see oncology rehab and has been referred to radiation oncology.  Next planned review will be in ~6 weeks to review plan and care plan.

## 2024-07-23 ENCOUNTER — HOSPITAL ENCOUNTER (OUTPATIENT)
Dept: PHYSICAL THERAPY | Age: 54
Setting detail: RECURRING SERIES
Discharge: HOME | End: 2024-07-26
Payer: COMMERCIAL

## 2024-07-23 ENCOUNTER — TELEPHONE (OUTPATIENT)
Dept: SURGERY | Age: 54
End: 2024-07-23

## 2024-07-23 DIAGNOSIS — Z90.13 HISTORY OF BILATERAL MASTECTOMY: ICD-10-CM

## 2024-07-23 DIAGNOSIS — M25.611 STIFFNESS OF RIGHT SHOULDER, NOT ELSEWHERE CLASSIFIED: Primary | ICD-10-CM

## 2024-07-23 DIAGNOSIS — M25.612 STIFFNESS OF LEFT SHOULDER, NOT ELSEWHERE CLASSIFIED: ICD-10-CM

## 2024-07-23 DIAGNOSIS — I97.2 POSTMASTECTOMY LYMPHEDEMA SYNDROME: ICD-10-CM

## 2024-07-23 DIAGNOSIS — I89.0 LYMPHEDEMA: Primary | ICD-10-CM

## 2024-07-23 PROBLEM — C50.912 LOBULAR CARCINOMA OF LEFT BREAST (HCC): Status: ACTIVE | Noted: 2024-07-23

## 2024-07-23 PROCEDURE — 97161 PT EVAL LOW COMPLEX 20 MIN: CPT

## 2024-07-23 PROCEDURE — 97140 MANUAL THERAPY 1/> REGIONS: CPT

## 2024-07-23 PROCEDURE — 97110 THERAPEUTIC EXERCISES: CPT

## 2024-07-23 NOTE — THERAPY EVALUATION
History/Comorbidities:   Ms. Luke  has a past medical history of Atypical ductal hyperplasia of right breast, Breast cancer (HCC), Chronic pain, Colitis, Depression, HTN (hypertension), Sleep apnea, and Ulcerative colitis (HCC).  Ms. Luke  has a past surgical history that includes orthopedic surgery (Right, 2017); Breast biopsy (Right); Cholecystectomy; VANESSA STEREO BREAST BX W LOC DEVICE 1ST LESION RIGHT (Right, 2018); Knee Arthroplasty (Right, 2019);  section; Laparoscopic hysterectomy; Knee arthroscopy (Left, 06/10/2022); Breast biopsy (Right, 2023); VANESSA STEREO BREAST BX W LOC DEVICE 1ST LESION RIGHT (Right, 2023); Breast biopsy (Right, 2024); Hysterectomy, vaginal; Hysterectomy, vaginal (2007); US BREAST BIOPSY W LOC DEVICE 1ST LESION RIGHT (Right, 2024); MRI BIOPSY BREAST W LOC DEVICE RIGHT EACH ADDL (Right, 2024); MRI BIOPSY BREAST W LOC DEVICE RIGHT 1ST LESION (Right, 2024); US PLACE BREAST LOC DEVICE 1ST LESION RIGHT (Right, 2024); Mastectomy (Bilateral, 2024); and Axillary Surgery (Right, 2024).  Social History/Living Environment:   Patient lives with their family    Prior Level of Function/Work/Activity:   Prior Level of Function: Independent   Current Level of Function: Mod Independent   Employment Status: Employed full time  Occupation: teacher      Learning:   Does the patient/guardian have any barriers to learning?: No barriers  Will there be a co-learner?: No  What is the preferred language of the patient/guardian?: English  Is an  required?: No  How does the patient/guardian prefer to learn new concepts?: Listening; Reading; Demonstration; Pictures/Videos    Fall Risk Scale:    Kenney Total Score: 0    Dominant Side:  right handed     OBJECTIVE   Shoulder:  AROM Shoulder (Degrees)  R Shoulder Flexion (0-180): 170  R Shoulder ABduction (0-180): 155  R Shoulder Ext Rotation (0-90): 90  L Shoulder Flexion (0-180): 170  L

## 2024-07-23 NOTE — TELEPHONE ENCOUNTER
Pt called to report a rash. Her surgery was on 6/21/24. I suggested topical benadryl cream and the tablet by mouth at night. If no improvement, try Urgent care. She voiced understanding.

## 2024-07-23 NOTE — PROGRESS NOTES
Allison Luke  : 1970  Primary: [place] [employer] [place] [employer]  [facility]  [address]  [address]  [facility] [address]  Phone: [phone]  Fax: [phone] Plan Frequency: 1-2x/week for 90 days  Plan of Care/Certification Expiration Date: 10/21/24        Plan of Care/Certification Expiration Date:  Plan of Care/Certification Expiration Date: 10/21/24    Frequency/Duration: Plan Frequency: 1-2x/week for 90 days      Time In/Out:   Time In: 0947  Time Out: 1045      PT Visit Info:         Visit Count:  1    OUTPATIENT PHYSICAL THERAPY:   Treatment Note 2024       Episode  (oncology rehab)               Treatment Diagnosis:     Stiffness of right shoulder, not elsewhere classified  Stiffness of left shoulder, not elsewhere classified  Postmastectomy lymphedema syndrome  Medical/Referring Diagnosis:    Malignant neoplasm of unspecified site of left female breast [C50.912]  Acquired absence of bilateral breasts and nipples [Z90.13]  Lymphedema, not elsewhere classified [I89.0]      Referring Physician:  Ines Hilario MD MD Orders:  PT Eval and Treat oncology rehab  Return MD Appt:  24   Date of Onset:  Onset Date: 24      Allergies:   Codeine, Penicillins, Morphine, and Adhesive tape  Restrictions/Precautions:   lymphedema      Interventions Planned (Treatment may consist of any combination of the following):     See Assessment Note    Subjective Comments:   The patient reports she isn't sure what therapy she will need.  Initial Pain Level::     0/10  Post Session Pain Level:       0/10  Medications Last Reviewed:  2024  Updated Objective Findings:  See Evaluation Note from today  Treatment   THERAPEUTIC EXERCISE: (10 minutes):    Exercises per grid below to improve mobility.  Required minimal verbal cues to promote proper body alignment.  Progressed range as indicated.  MANUAL THERAPY: (15 minutes):   Complex decongestive physiotherapy was

## 2024-07-24 ENCOUNTER — OFFICE VISIT (OUTPATIENT)
Dept: FAMILY MEDICINE CLINIC | Facility: CLINIC | Age: 54
End: 2024-07-24
Payer: COMMERCIAL

## 2024-07-24 VITALS
DIASTOLIC BLOOD PRESSURE: 98 MMHG | HEART RATE: 79 BPM | OXYGEN SATURATION: 97 % | SYSTOLIC BLOOD PRESSURE: 152 MMHG | BODY MASS INDEX: 35.5 KG/M2 | WEIGHT: 188 LBS | HEIGHT: 61 IN

## 2024-07-24 DIAGNOSIS — N39.41 URGE INCONTINENCE: ICD-10-CM

## 2024-07-24 DIAGNOSIS — I10 PRIMARY HYPERTENSION: ICD-10-CM

## 2024-07-24 PROCEDURE — 3077F SYST BP >= 140 MM HG: CPT | Performed by: FAMILY MEDICINE

## 2024-07-24 PROCEDURE — 99213 OFFICE O/P EST LOW 20 MIN: CPT | Performed by: FAMILY MEDICINE

## 2024-07-24 PROCEDURE — 3080F DIAST BP >= 90 MM HG: CPT | Performed by: FAMILY MEDICINE

## 2024-07-24 RX ORDER — MIRABEGRON 50 MG/1
50 TABLET, EXTENDED RELEASE ORAL DAILY
Qty: 90 TABLET | Refills: 1 | Status: SHIPPED | OUTPATIENT
Start: 2024-07-24

## 2024-07-24 RX ORDER — VALSARTAN 160 MG/1
160 TABLET ORAL DAILY
Qty: 90 TABLET | Refills: 3 | Status: SHIPPED | OUTPATIENT
Start: 2024-07-24

## 2024-07-24 ASSESSMENT — ENCOUNTER SYMPTOMS
SHORTNESS OF BREATH: 0
VOMITING: 0
NAUSEA: 0

## 2024-07-24 NOTE — PROGRESS NOTES
PROGRESS NOTE    SUBJECTIVE:   Allison Luke is a 54 y.o. female seen for a follow up visit regarding the following chief complaint:     Chief Complaint   Patient presents with    Discuss Labs    Blood Pressure Check                      HPI  Patient presents to the office today for blood pressure check most recently had bilateral mastectomy secondary to breast cancer and states she is doing fine    Past Medical History, Past Surgical History, Family history, Social History, and Medications were all reviewed with the patient today and updated as necessary.       Current Outpatient Medications   Medication Sig Dispense Refill    mirabegron (MYRBETRIQ) 50 MG TB24 Take 50 mg by mouth daily 90 tablet 1    valsartan (DIOVAN) 160 MG tablet Take 1 tablet by mouth daily 90 tablet 3    pregabalin (LYRICA) 75 MG capsule       traZODone (DESYREL) 50 MG tablet Take 2 tablets by mouth nightly Take 2 tab at bedtime 180 tablet 3    albuterol sulfate HFA (PROVENTIL;VENTOLIN;PROAIR) 108 (90 Base) MCG/ACT inhaler TAKE 2 PUFFS BY MOUTH EVERY 4 HOURS 18 g 5    tamoxifen (NOLVADEX) 10 MG tablet Take 1 tablet by mouth daily 90 tablet 3    loratadine (CLARITIN) 10 MG capsule Take 1 capsule by mouth daily       Current Facility-Administered Medications   Medication Dose Route Frequency Provider Last Rate Last Admin    diatrizoate meglumine-sodium (GASTROGRAFIN) 66-10 % solution 15 mL  15 mL Oral ONCE PRN Ines Hilario MD   15 mL at 06/18/24 0847     Allergies   Allergen Reactions    Codeine Other (See Comments)     Makes her \"wired\" hyper    Penicillins Rash    Morphine Other (See Comments)     Grandmother had brain stem stroke with this    Adhesive Tape Rash     PAPER TAPE IS OK     Patient Active Problem List   Diagnosis    Mild depression    KELSEA (obstructive sleep apnea)    Psychophysiological insomnia    Obesity (BMI 30.0-34.9)    Nocturnal hypoxemia    Reactive depression    Hypertension    Acute medial meniscus tear, left,

## 2024-07-25 ENCOUNTER — TELEPHONE (OUTPATIENT)
Dept: SURGERY | Age: 54
End: 2024-07-25

## 2024-07-25 NOTE — TELEPHONE ENCOUNTER
Pt called regarding a rash to her mastectomy site. She tried the benadryl cream as well as tablet by mouth which resulting in the rash getting worse. I suggested urgent care but the pt declined. I suggested that she call the [place] office to see Dr. Martines. She voiced understanding.

## 2024-07-31 ENCOUNTER — HOSPITAL ENCOUNTER (OUTPATIENT)
Dept: RADIATION ONCOLOGY | Age: 54
Setting detail: RECURRING SERIES
Discharge: HOME | End: 2024-08-03
Payer: COMMERCIAL

## 2024-07-31 ENCOUNTER — RESEARCH ENCOUNTER (OUTPATIENT)
Dept: ONCOLOGY | Age: 54
End: 2024-07-31

## 2024-07-31 ENCOUNTER — HOSPITAL ENCOUNTER (OUTPATIENT)
Dept: PHYSICAL THERAPY | Age: 54
Setting detail: RECURRING SERIES
Discharge: HOME | End: 2024-08-03
Payer: COMMERCIAL

## 2024-07-31 VITALS
HEART RATE: 97 BPM | RESPIRATION RATE: 18 BRPM | BODY MASS INDEX: 35.28 KG/M2 | TEMPERATURE: 98.1 F | WEIGHT: 186.7 LBS | OXYGEN SATURATION: 97 %

## 2024-07-31 PROCEDURE — 97110 THERAPEUTIC EXERCISES: CPT

## 2024-07-31 PROCEDURE — 99211 OFF/OP EST MAY X REQ PHY/QHP: CPT

## 2024-07-31 ASSESSMENT — PATIENT HEALTH QUESTIONNAIRE - PHQ9
SUM OF ALL RESPONSES TO PHQ QUESTIONS 1-9: 0
SUM OF ALL RESPONSES TO PHQ QUESTIONS 1-9: 0
SUM OF ALL RESPONSES TO PHQ9 QUESTIONS 1 & 2: 0
2. FEELING DOWN, DEPRESSED OR HOPELESS: NOT AT ALL
SUM OF ALL RESPONSES TO PHQ QUESTIONS 1-9: 0
SUM OF ALL RESPONSES TO PHQ QUESTIONS 1-9: 0

## 2024-07-31 NOTE — PROGRESS NOTES
Allison Luke  : 1970  Primary: [place] [employer] [place] [employer]  Secondary:  [facility]  [address]  [address]  [facility] [address]  Phone: [phone]  Fax: [phone] Plan Frequency: 1-2x/week for 90 days  Plan of Care/Certification Expiration Date: 10/21/24        Plan of Care/Certification Expiration Date:  Plan of Care/Certification Expiration Date: 10/21/24    Frequency/Duration: Plan Frequency: 1-2x/week for 90 days      Time In/Out:   Time In: 0800  Time Out: 0830      PT Visit Info:         Visit Count:  2    OUTPATIENT PHYSICAL THERAPY:   Treatment Note 2024       Episode  (oncology rehab)               Treatment Diagnosis:     Stiffness of right shoulder, not elsewhere classified  Stiffness of left shoulder, not elsewhere classified  Postmastectomy lymphedema syndrome  Medical/Referring Diagnosis:    Malignant neoplasm of unspecified site of left female breast [C50.912]  Acquired absence of bilateral breasts and nipples [Z90.13]  Lymphedema, not elsewhere classified [I89.0]      Referring Physician:  Ines Hilario MD MD Orders:  PT Eval and Treat oncology rehab  Return MD Appt:  24   Date of Onset:  Onset Date: 24      Allergies:   Codeine, Penicillins, Morphine, and Adhesive tape  Restrictions/Precautions:   lymphedema      Interventions Planned (Treatment may consist of any combination of the following):     See Assessment Note    Subjective Comments:   The patient reports she isn't sure what therapy she will need.  Initial Pain Level::      /10  Post Session Pain Level:        /10  Medications Last Reviewed:  2024  Updated Objective Findings:  See Evaluation Note from today  Treatment   THERAPEUTIC EXERCISE: (30 minutes):    Exercises per grid below to improve mobility.  Required minimal verbal cues to promote proper body alignment.  Progressed range as indicated.  MANUAL THERAPY: ( minutes):   Complex decongestive physiotherapy was utilized

## 2024-07-31 NOTE — PROGRESS NOTES
Consult for Right Breast Cancer, ER/NC +:    Patient arrives today unaccompanied   Patient reports no previous Radiation Treatments  Patient reports no pacemaker or other chest implants  Patient reports current pain of 0/10,   Reports rash both side of Operation lee, Rash  Patient reports no history of Collagen Vascular Disease  Frequently asked question sheets provided  Vitamin Sheet provided   Consents Signed  Sim Scheduled for two weeks to allow for healing/rash

## 2024-07-31 NOTE — PROGRESS NOTES
[facility] RADIATION ONCOLOGY CONSULTATION    Patient: Allison Luke MRN: [id number]  SSN: [id number]    YOB: 1970  Age: 54 y.o.  Sex: female      Other Providers:  Dr. Hilario, Dr. Juárez    CHIEF COMPLAINT: Abnormal screening mammogram    DIAGNOSIS:  Cancer Staging   Breast cancer in female (HCC)  Staging form: Breast, AJCC 8th Edition  - Pathologic: Stage IA (pT1b, pN1a, cM0, G1, ER+, WV+, HER2-) - Signed by Ines Hilario MD on 7/18/2024       PREVIOUS RADIATION TREATMENT:  None    HISTORY OF PRESENT ILLNESS:  Allison Luke is a 54 y.o. female who I am seeing at the request of Dr. Hilario.    Ms. Luke was in her usual state of health until 11/6/23 at which time she underwent routine screening mammogram which demonstrated a prior posterior medial right breast excisional biopsy with evolving distortion at this level and new microcalcifications for which additional imaging was recommended.  On 11/16/2023 she underwent diagnostic mammogram which confirmed an indeterminate right upper outer breast calcifications for which biopsy was recommended.  Biopsy 12/6/2023 showed lobular carcinoma in situ.  On 1/26/2024 she underwent right breast lumpectomy which showed residual lobular carcinoma in situ margins negative.  On 4/3/2024 she underwent left breast diagnostic mammogram and ultrasound after a lump was appreciated by her physician.  This demonstrated a complicated cyst versus hypoechoic oval mass in the left breast.  MRI 5/2/2024 showed 2 separate areas of non-mass enhancement in the right breast 1 11:00 and 1 at 10:00.  There is an indeterminate lymph node in the far upper aspect of the right breast.  There are numerous cysts in the left breast.  Diagnostic right mammogram and ultrasound 5/23/2024 showed an abnormal right axillary lymph node and no definite mammographic or ultrasound correlate to the right breast non-mass enhancement.  Lymph node

## 2024-07-31 NOTE — PROGRESS NOTES
Dr. Hairston discussed CCTG MA.39 clincial trail with patient, patient declined.  Discussed DCP-001 screening trial with patient and patient signed consent.  Asked screening tool questions to patient and recorded patient reported answers.

## 2024-08-07 ENCOUNTER — HOSPITAL ENCOUNTER (OUTPATIENT)
Dept: RADIATION ONCOLOGY | Age: 54
Setting detail: RECURRING SERIES
Discharge: HOME | End: 2024-08-10
Payer: COMMERCIAL

## 2024-08-07 PROCEDURE — 77290 THER RAD SIMULAJ FIELD CPLX: CPT

## 2024-08-07 PROCEDURE — 77332 RADIATION TREATMENT AID(S): CPT

## 2024-08-08 ENCOUNTER — APPOINTMENT (OUTPATIENT)
Dept: RADIATION ONCOLOGY | Age: 54
End: 2024-08-08
Payer: COMMERCIAL

## 2024-08-17 PROBLEM — Z13.29 SCREENING FOR HYPOTHYROIDISM: Status: RESOLVED | Noted: 2024-07-18 | Resolved: 2024-08-17

## 2024-08-22 ENCOUNTER — HOSPITAL ENCOUNTER (OUTPATIENT)
Dept: RADIATION ONCOLOGY | Age: 54
Setting detail: RECURRING SERIES
Discharge: HOME | End: 2024-08-25
Payer: COMMERCIAL

## 2024-08-22 LAB
RAD ONC ARIA COURSE FIRST TREATMENT DATE: NORMAL
RAD ONC ARIA COURSE ID: NORMAL
RAD ONC ARIA COURSE INTENT: NORMAL
RAD ONC ARIA COURSE LAST TREATMENT DATE: NORMAL
RAD ONC ARIA COURSE SESSION NUMBER: 1
RAD ONC ARIA COURSE START DATE: NORMAL
RAD ONC ARIA COURSE TREATMENT ELAPSED DAYS: 0
RAD ONC ARIA PLAN FRACTIONS TREATED TO DATE: 1
RAD ONC ARIA PLAN ID: NORMAL
RAD ONC ARIA PLAN PRESCRIBED DOSE PER FRACTION: 2 GY
RAD ONC ARIA PLAN PRIMARY REFERENCE POINT: NORMAL
RAD ONC ARIA PLAN TOTAL FRACTIONS PRESCRIBED: 25
RAD ONC ARIA PLAN TOTAL PRESCRIBED DOSE: 5000 CGY
RAD ONC ARIA REFERENCE POINT DOSAGE GIVEN TO DATE: 2 GY
RAD ONC ARIA REFERENCE POINT ID: NORMAL
RAD ONC ARIA REFERENCE POINT SESSION DOSAGE GIVEN: 2 GY

## 2024-08-22 PROCEDURE — 77385 HC NTSTY MODUL RAD TX DLVR SMPL: CPT

## 2024-08-23 ENCOUNTER — HOSPITAL ENCOUNTER (OUTPATIENT)
Dept: RADIATION ONCOLOGY | Age: 54
Setting detail: RECURRING SERIES
Discharge: HOME | End: 2024-08-26
Payer: COMMERCIAL

## 2024-08-23 LAB
RAD ONC ARIA COURSE FIRST TREATMENT DATE: NORMAL
RAD ONC ARIA COURSE ID: NORMAL
RAD ONC ARIA COURSE INTENT: NORMAL
RAD ONC ARIA COURSE LAST TREATMENT DATE: NORMAL
RAD ONC ARIA COURSE SESSION NUMBER: 2
RAD ONC ARIA COURSE START DATE: NORMAL
RAD ONC ARIA COURSE TREATMENT ELAPSED DAYS: 1
RAD ONC ARIA PLAN FRACTIONS TREATED TO DATE: 2
RAD ONC ARIA PLAN ID: NORMAL
RAD ONC ARIA PLAN PRESCRIBED DOSE PER FRACTION: 2 GY
RAD ONC ARIA PLAN PRIMARY REFERENCE POINT: NORMAL
RAD ONC ARIA PLAN TOTAL FRACTIONS PRESCRIBED: 25
RAD ONC ARIA PLAN TOTAL PRESCRIBED DOSE: 5000 CGY
RAD ONC ARIA REFERENCE POINT DOSAGE GIVEN TO DATE: 4 GY
RAD ONC ARIA REFERENCE POINT ID: NORMAL
RAD ONC ARIA REFERENCE POINT SESSION DOSAGE GIVEN: 2 GY

## 2024-08-23 PROCEDURE — 77385 HC NTSTY MODUL RAD TX DLVR SMPL: CPT

## 2024-08-26 ENCOUNTER — HOSPITAL ENCOUNTER (OUTPATIENT)
Dept: RADIATION ONCOLOGY | Age: 54
Setting detail: RECURRING SERIES
Discharge: HOME | End: 2024-08-29
Payer: COMMERCIAL

## 2024-08-26 LAB
RAD ONC ARIA COURSE FIRST TREATMENT DATE: NORMAL
RAD ONC ARIA COURSE ID: NORMAL
RAD ONC ARIA COURSE INTENT: NORMAL
RAD ONC ARIA COURSE LAST TREATMENT DATE: NORMAL
RAD ONC ARIA COURSE SESSION NUMBER: 3
RAD ONC ARIA COURSE START DATE: NORMAL
RAD ONC ARIA COURSE TREATMENT ELAPSED DAYS: 4
RAD ONC ARIA PLAN FRACTIONS TREATED TO DATE: 3
RAD ONC ARIA PLAN ID: NORMAL
RAD ONC ARIA PLAN PRESCRIBED DOSE PER FRACTION: 2 GY
RAD ONC ARIA PLAN PRIMARY REFERENCE POINT: NORMAL
RAD ONC ARIA PLAN TOTAL FRACTIONS PRESCRIBED: 25
RAD ONC ARIA PLAN TOTAL PRESCRIBED DOSE: 5000 CGY
RAD ONC ARIA REFERENCE POINT DOSAGE GIVEN TO DATE: 6 GY
RAD ONC ARIA REFERENCE POINT ID: NORMAL
RAD ONC ARIA REFERENCE POINT SESSION DOSAGE GIVEN: 2 GY

## 2024-08-26 PROCEDURE — 77385 HC NTSTY MODUL RAD TX DLVR SMPL: CPT

## 2024-08-27 ENCOUNTER — HOSPITAL ENCOUNTER (OUTPATIENT)
Dept: RADIATION ONCOLOGY | Age: 54
Setting detail: RECURRING SERIES
Discharge: HOME | End: 2024-08-30
Payer: COMMERCIAL

## 2024-08-27 LAB
RAD ONC ARIA COURSE FIRST TREATMENT DATE: NORMAL
RAD ONC ARIA COURSE ID: NORMAL
RAD ONC ARIA COURSE INTENT: NORMAL
RAD ONC ARIA COURSE LAST TREATMENT DATE: NORMAL
RAD ONC ARIA COURSE SESSION NUMBER: 4
RAD ONC ARIA COURSE START DATE: NORMAL
RAD ONC ARIA COURSE TREATMENT ELAPSED DAYS: 5
RAD ONC ARIA PLAN FRACTIONS TREATED TO DATE: 4
RAD ONC ARIA PLAN ID: NORMAL
RAD ONC ARIA PLAN PRESCRIBED DOSE PER FRACTION: 2 GY
RAD ONC ARIA PLAN PRIMARY REFERENCE POINT: NORMAL
RAD ONC ARIA PLAN TOTAL FRACTIONS PRESCRIBED: 25
RAD ONC ARIA PLAN TOTAL PRESCRIBED DOSE: 5000 CGY
RAD ONC ARIA REFERENCE POINT DOSAGE GIVEN TO DATE: 8 GY
RAD ONC ARIA REFERENCE POINT ID: NORMAL
RAD ONC ARIA REFERENCE POINT SESSION DOSAGE GIVEN: 2 GY

## 2024-08-27 PROCEDURE — 77385 HC NTSTY MODUL RAD TX DLVR SMPL: CPT

## 2024-08-27 RX ORDER — ONDANSETRON 8 MG/1
8 TABLET, FILM COATED ORAL EVERY 8 HOURS PRN
Qty: 90 TABLET | Refills: 0 | Status: SHIPPED | OUTPATIENT
Start: 2024-08-27

## 2024-08-27 NOTE — ON TREATMENT VISIT
[facility] RADIATION ONCOLOGY ON TREATMENT VISIT    Patient: Allison Luke MRN: [id number]  SSN: [id number]    YOB: 1970  Age: 54 y.o.  Sex: female      08/27/24    Diagnosis:  Cancer Staging   Breast cancer in female (HCC)  Staging form: Breast, AJCC 8th Edition  - Pathologic: Stage IA (pT1b, pN1a, cM0, G1, ER+, DE+, HER2-) - Signed by Ines Hilario MD on 7/18/2024      This is a 54 y.o. female who is currently receiving adjuvant radiation therapy to the chest wall.    Current RT dose: 8/50 Gy in 4/25 fractions.     No concurrent systemic therapy    Subjective:  Week 1: Severe headache and nausea over the weekend now resolved.     Objective:  There were no vitals filed for this visit.  Pain 0/10    General: Alert and conversant, in NAD  Skin: Intact    Assessment:  Patient is tolerating radiation therapy well with headache and nausea early in treatment.     Plan:  -Coconut oil for moisturizer.  -Will prescribe Zofran for nausea and recommended anti inflammatories as needed for headache. Reviewed that I would not expect a persistent severe headache during radiation and encouraged her to let us know if symptoms return or worsen.   -Continue RT as planned.  -Treatment images reviewed.  -The patient has a documented plan of care to address pain.  Plan to improve pain control as follows: as above.    Kasie Hairston MD  08/27/24

## 2024-08-28 ENCOUNTER — HOSPITAL ENCOUNTER (OUTPATIENT)
Dept: RADIATION ONCOLOGY | Age: 54
Setting detail: RECURRING SERIES
Discharge: HOME | End: 2024-08-31
Payer: COMMERCIAL

## 2024-08-28 LAB
RAD ONC ARIA COURSE FIRST TREATMENT DATE: NORMAL
RAD ONC ARIA COURSE ID: NORMAL
RAD ONC ARIA COURSE INTENT: NORMAL
RAD ONC ARIA COURSE LAST TREATMENT DATE: NORMAL
RAD ONC ARIA COURSE SESSION NUMBER: 5
RAD ONC ARIA COURSE START DATE: NORMAL
RAD ONC ARIA COURSE TREATMENT ELAPSED DAYS: 6
RAD ONC ARIA PLAN FRACTIONS TREATED TO DATE: 5
RAD ONC ARIA PLAN ID: NORMAL
RAD ONC ARIA PLAN PRESCRIBED DOSE PER FRACTION: 2 GY
RAD ONC ARIA PLAN PRIMARY REFERENCE POINT: NORMAL
RAD ONC ARIA PLAN TOTAL FRACTIONS PRESCRIBED: 25
RAD ONC ARIA PLAN TOTAL PRESCRIBED DOSE: 5000 CGY
RAD ONC ARIA REFERENCE POINT DOSAGE GIVEN TO DATE: 10 GY
RAD ONC ARIA REFERENCE POINT ID: NORMAL
RAD ONC ARIA REFERENCE POINT SESSION DOSAGE GIVEN: 2 GY

## 2024-08-28 PROCEDURE — 77385 HC NTSTY MODUL RAD TX DLVR SMPL: CPT

## 2024-08-29 ENCOUNTER — HOSPITAL ENCOUNTER (OUTPATIENT)
Dept: RADIATION ONCOLOGY | Age: 54
Setting detail: RECURRING SERIES
End: 2024-08-29
Payer: COMMERCIAL

## 2024-08-29 LAB
RAD ONC ARIA COURSE FIRST TREATMENT DATE: NORMAL
RAD ONC ARIA COURSE ID: NORMAL
RAD ONC ARIA COURSE INTENT: NORMAL
RAD ONC ARIA COURSE LAST TREATMENT DATE: NORMAL
RAD ONC ARIA COURSE SESSION NUMBER: 6
RAD ONC ARIA COURSE START DATE: NORMAL
RAD ONC ARIA COURSE TREATMENT ELAPSED DAYS: 7
RAD ONC ARIA PLAN FRACTIONS TREATED TO DATE: 6
RAD ONC ARIA PLAN ID: NORMAL
RAD ONC ARIA PLAN PRESCRIBED DOSE PER FRACTION: 2 GY
RAD ONC ARIA PLAN PRIMARY REFERENCE POINT: NORMAL
RAD ONC ARIA PLAN TOTAL FRACTIONS PRESCRIBED: 25
RAD ONC ARIA PLAN TOTAL PRESCRIBED DOSE: 5000 CGY
RAD ONC ARIA REFERENCE POINT DOSAGE GIVEN TO DATE: 12 GY
RAD ONC ARIA REFERENCE POINT ID: NORMAL
RAD ONC ARIA REFERENCE POINT SESSION DOSAGE GIVEN: 2 GY

## 2024-08-29 PROCEDURE — 77385 HC NTSTY MODUL RAD TX DLVR SMPL: CPT

## 2024-08-29 PROCEDURE — 77336 RADIATION PHYSICS CONSULT: CPT

## 2024-08-30 ENCOUNTER — HOSPITAL ENCOUNTER (OUTPATIENT)
Dept: RADIATION ONCOLOGY | Age: 54
Setting detail: RECURRING SERIES
End: 2024-08-30
Payer: COMMERCIAL

## 2024-08-30 LAB
RAD ONC ARIA COURSE FIRST TREATMENT DATE: NORMAL
RAD ONC ARIA COURSE ID: NORMAL
RAD ONC ARIA COURSE INTENT: NORMAL
RAD ONC ARIA COURSE LAST TREATMENT DATE: NORMAL
RAD ONC ARIA COURSE SESSION NUMBER: 7
RAD ONC ARIA COURSE START DATE: NORMAL
RAD ONC ARIA COURSE TREATMENT ELAPSED DAYS: 8
RAD ONC ARIA PLAN FRACTIONS TREATED TO DATE: 7
RAD ONC ARIA PLAN ID: NORMAL
RAD ONC ARIA PLAN PRESCRIBED DOSE PER FRACTION: 2 GY
RAD ONC ARIA PLAN PRIMARY REFERENCE POINT: NORMAL
RAD ONC ARIA PLAN TOTAL FRACTIONS PRESCRIBED: 25
RAD ONC ARIA PLAN TOTAL PRESCRIBED DOSE: 5000 CGY
RAD ONC ARIA REFERENCE POINT DOSAGE GIVEN TO DATE: 14 GY
RAD ONC ARIA REFERENCE POINT ID: NORMAL
RAD ONC ARIA REFERENCE POINT SESSION DOSAGE GIVEN: 2 GY

## 2024-08-30 PROCEDURE — 77385 HC NTSTY MODUL RAD TX DLVR SMPL: CPT

## 2024-09-03 ENCOUNTER — HOSPITAL ENCOUNTER (OUTPATIENT)
Dept: RADIATION ONCOLOGY | Age: 54
Setting detail: RECURRING SERIES
Discharge: HOME | End: 2024-09-06
Payer: COMMERCIAL

## 2024-09-03 LAB
RAD ONC ARIA COURSE FIRST TREATMENT DATE: NORMAL
RAD ONC ARIA COURSE ID: NORMAL
RAD ONC ARIA COURSE INTENT: NORMAL
RAD ONC ARIA COURSE LAST TREATMENT DATE: NORMAL
RAD ONC ARIA COURSE SESSION NUMBER: 8
RAD ONC ARIA COURSE START DATE: NORMAL
RAD ONC ARIA COURSE TREATMENT ELAPSED DAYS: 12
RAD ONC ARIA PLAN FRACTIONS TREATED TO DATE: 8
RAD ONC ARIA PLAN ID: NORMAL
RAD ONC ARIA PLAN PRESCRIBED DOSE PER FRACTION: 2 GY
RAD ONC ARIA PLAN PRIMARY REFERENCE POINT: NORMAL
RAD ONC ARIA PLAN TOTAL FRACTIONS PRESCRIBED: 25
RAD ONC ARIA PLAN TOTAL PRESCRIBED DOSE: 5000 CGY
RAD ONC ARIA REFERENCE POINT DOSAGE GIVEN TO DATE: 16 GY
RAD ONC ARIA REFERENCE POINT ID: NORMAL
RAD ONC ARIA REFERENCE POINT SESSION DOSAGE GIVEN: 2 GY

## 2024-09-03 PROCEDURE — 77385 HC NTSTY MODUL RAD TX DLVR SMPL: CPT

## 2024-09-03 NOTE — PROGRESS NOTES
[facility] RADIATION ONCOLOGY ON TREATMENT VISIT    Patient: Allison Luke MRN: [id number]  SSN: [id number]    YOB: 1970  Age: 54 y.o.  Sex: female      09/03/24    Diagnosis:  Cancer Staging   Breast cancer in female (HCC)  Staging form: Breast, AJCC 8th Edition  - Pathologic: Stage IA (pT1b, pN1a, cM0, G1, ER+, [place]+, HER2-) - Signed by Ines Hilario MD on 7/18/2024      This is a 54 y.o. female who is currently receiving adjuvant radiation therapy to the chest wall.    Current RT dose: 16/50 Gy in 8/25 fractions.     No concurrent systemic therapy    Subjective:  Week 1: Severe headache and nausea over the weekend now resolved.   Week 2: No complaints.     Objective:  There were no vitals filed for this visit.  Pain 0/10    General: Alert and conversant, in NAD  Skin: Erythema over the incisions, well healed no concern for infection.     Assessment:  Patient is tolerating radiation therapy well without treatment related toxicities at this time.    Plan:  -Coconut oil for moisturizer  -Zofran as needed for nausea  -Continue RT as planned.  -Treatment images reviewed.  -The patient has a documented plan of care to address pain.  Pain is not present.      Kasie Hairston MD  09/03/24

## 2024-09-04 ENCOUNTER — HOSPITAL ENCOUNTER (OUTPATIENT)
Dept: RADIATION ONCOLOGY | Age: 54
Setting detail: RECURRING SERIES
Discharge: HOME | End: 2024-09-07
Payer: COMMERCIAL

## 2024-09-04 LAB
RAD ONC ARIA COURSE FIRST TREATMENT DATE: NORMAL
RAD ONC ARIA COURSE ID: NORMAL
RAD ONC ARIA COURSE INTENT: NORMAL
RAD ONC ARIA COURSE LAST TREATMENT DATE: NORMAL
RAD ONC ARIA COURSE SESSION NUMBER: 9
RAD ONC ARIA COURSE START DATE: NORMAL
RAD ONC ARIA COURSE TREATMENT ELAPSED DAYS: 13
RAD ONC ARIA PLAN FRACTIONS TREATED TO DATE: 9
RAD ONC ARIA PLAN ID: NORMAL
RAD ONC ARIA PLAN PRESCRIBED DOSE PER FRACTION: 2 GY
RAD ONC ARIA PLAN PRIMARY REFERENCE POINT: NORMAL
RAD ONC ARIA PLAN TOTAL FRACTIONS PRESCRIBED: 25
RAD ONC ARIA PLAN TOTAL PRESCRIBED DOSE: 5000 CGY
RAD ONC ARIA REFERENCE POINT DOSAGE GIVEN TO DATE: 18 GY
RAD ONC ARIA REFERENCE POINT ID: NORMAL
RAD ONC ARIA REFERENCE POINT SESSION DOSAGE GIVEN: 2 GY

## 2024-09-04 PROCEDURE — 77385 HC NTSTY MODUL RAD TX DLVR SMPL: CPT

## 2024-09-05 ENCOUNTER — HOSPITAL ENCOUNTER (OUTPATIENT)
Dept: RADIATION ONCOLOGY | Age: 54
Setting detail: RECURRING SERIES
Discharge: HOME | End: 2024-09-08
Payer: COMMERCIAL

## 2024-09-05 LAB
RAD ONC ARIA COURSE FIRST TREATMENT DATE: NORMAL
RAD ONC ARIA COURSE ID: NORMAL
RAD ONC ARIA COURSE INTENT: NORMAL
RAD ONC ARIA COURSE LAST TREATMENT DATE: NORMAL
RAD ONC ARIA COURSE SESSION NUMBER: 10
RAD ONC ARIA COURSE START DATE: NORMAL
RAD ONC ARIA COURSE TREATMENT ELAPSED DAYS: 14
RAD ONC ARIA PLAN FRACTIONS TREATED TO DATE: 10
RAD ONC ARIA PLAN ID: NORMAL
RAD ONC ARIA PLAN PRESCRIBED DOSE PER FRACTION: 2 GY
RAD ONC ARIA PLAN PRIMARY REFERENCE POINT: NORMAL
RAD ONC ARIA PLAN TOTAL FRACTIONS PRESCRIBED: 25
RAD ONC ARIA PLAN TOTAL PRESCRIBED DOSE: 5000 CGY
RAD ONC ARIA REFERENCE POINT DOSAGE GIVEN TO DATE: 20 GY
RAD ONC ARIA REFERENCE POINT ID: NORMAL
RAD ONC ARIA REFERENCE POINT SESSION DOSAGE GIVEN: 2 GY

## 2024-09-05 PROCEDURE — 77385 HC NTSTY MODUL RAD TX DLVR SMPL: CPT

## 2024-09-06 ENCOUNTER — HOSPITAL ENCOUNTER (OUTPATIENT)
Dept: RADIATION ONCOLOGY | Age: 54
Setting detail: RECURRING SERIES
Discharge: HOME | End: 2024-09-09
Payer: COMMERCIAL

## 2024-09-06 LAB
RAD ONC ARIA COURSE FIRST TREATMENT DATE: NORMAL
RAD ONC ARIA COURSE ID: NORMAL
RAD ONC ARIA COURSE INTENT: NORMAL
RAD ONC ARIA COURSE LAST TREATMENT DATE: NORMAL
RAD ONC ARIA COURSE SESSION NUMBER: 11
RAD ONC ARIA COURSE START DATE: NORMAL
RAD ONC ARIA COURSE TREATMENT ELAPSED DAYS: 15
RAD ONC ARIA PLAN FRACTIONS TREATED TO DATE: 11
RAD ONC ARIA PLAN ID: NORMAL
RAD ONC ARIA PLAN PRESCRIBED DOSE PER FRACTION: 2 GY
RAD ONC ARIA PLAN PRIMARY REFERENCE POINT: NORMAL
RAD ONC ARIA PLAN TOTAL FRACTIONS PRESCRIBED: 25
RAD ONC ARIA PLAN TOTAL PRESCRIBED DOSE: 5000 CGY
RAD ONC ARIA REFERENCE POINT DOSAGE GIVEN TO DATE: 22 GY
RAD ONC ARIA REFERENCE POINT ID: NORMAL
RAD ONC ARIA REFERENCE POINT SESSION DOSAGE GIVEN: 2 GY

## 2024-09-06 PROCEDURE — 77385 HC NTSTY MODUL RAD TX DLVR SMPL: CPT

## 2024-09-06 PROCEDURE — 77336 RADIATION PHYSICS CONSULT: CPT

## 2024-09-09 ENCOUNTER — HOSPITAL ENCOUNTER (OUTPATIENT)
Dept: RADIATION ONCOLOGY | Age: 54
Setting detail: RECURRING SERIES
Discharge: HOME | End: 2024-09-12
Payer: COMMERCIAL

## 2024-09-09 LAB
RAD ONC ARIA COURSE FIRST TREATMENT DATE: NORMAL
RAD ONC ARIA COURSE ID: NORMAL
RAD ONC ARIA COURSE INTENT: NORMAL
RAD ONC ARIA COURSE LAST TREATMENT DATE: NORMAL
RAD ONC ARIA COURSE SESSION NUMBER: 12
RAD ONC ARIA COURSE START DATE: NORMAL
RAD ONC ARIA COURSE TREATMENT ELAPSED DAYS: 18
RAD ONC ARIA PLAN FRACTIONS TREATED TO DATE: 12
RAD ONC ARIA PLAN ID: NORMAL
RAD ONC ARIA PLAN PRESCRIBED DOSE PER FRACTION: 2 GY
RAD ONC ARIA PLAN PRIMARY REFERENCE POINT: NORMAL
RAD ONC ARIA PLAN TOTAL FRACTIONS PRESCRIBED: 25
RAD ONC ARIA PLAN TOTAL PRESCRIBED DOSE: 5000 CGY
RAD ONC ARIA REFERENCE POINT DOSAGE GIVEN TO DATE: 24 GY
RAD ONC ARIA REFERENCE POINT ID: NORMAL
RAD ONC ARIA REFERENCE POINT SESSION DOSAGE GIVEN: 2 GY

## 2024-09-09 PROCEDURE — 77385 HC NTSTY MODUL RAD TX DLVR SMPL: CPT

## 2024-09-09 RX ORDER — SILVER SULFADIAZINE 10 MG/G
CREAM TOPICAL
Qty: 25 G | Refills: 3 | Status: SHIPPED | OUTPATIENT
Start: 2024-09-09

## 2024-09-10 ENCOUNTER — HOSPITAL ENCOUNTER (OUTPATIENT)
Dept: RADIATION ONCOLOGY | Age: 54
Setting detail: RECURRING SERIES
Discharge: HOME | End: 2024-09-13
Payer: COMMERCIAL

## 2024-09-10 LAB
RAD ONC ARIA COURSE FIRST TREATMENT DATE: NORMAL
RAD ONC ARIA COURSE ID: NORMAL
RAD ONC ARIA COURSE INTENT: NORMAL
RAD ONC ARIA COURSE LAST TREATMENT DATE: NORMAL
RAD ONC ARIA COURSE SESSION NUMBER: 13
RAD ONC ARIA COURSE START DATE: NORMAL
RAD ONC ARIA COURSE TREATMENT ELAPSED DAYS: 19
RAD ONC ARIA PLAN FRACTIONS TREATED TO DATE: 13
RAD ONC ARIA PLAN ID: NORMAL
RAD ONC ARIA PLAN PRESCRIBED DOSE PER FRACTION: 2 GY
RAD ONC ARIA PLAN PRIMARY REFERENCE POINT: NORMAL
RAD ONC ARIA PLAN TOTAL FRACTIONS PRESCRIBED: 25
RAD ONC ARIA PLAN TOTAL PRESCRIBED DOSE: 5000 CGY
RAD ONC ARIA REFERENCE POINT DOSAGE GIVEN TO DATE: 26 GY
RAD ONC ARIA REFERENCE POINT ID: NORMAL
RAD ONC ARIA REFERENCE POINT SESSION DOSAGE GIVEN: 2 GY

## 2024-09-10 PROCEDURE — 77385 HC NTSTY MODUL RAD TX DLVR SMPL: CPT

## 2024-09-11 ENCOUNTER — HOSPITAL ENCOUNTER (OUTPATIENT)
Dept: RADIATION ONCOLOGY | Age: 54
Setting detail: RECURRING SERIES
Discharge: HOME | End: 2024-09-14
Payer: COMMERCIAL

## 2024-09-11 LAB
RAD ONC ARIA COURSE FIRST TREATMENT DATE: NORMAL
RAD ONC ARIA COURSE ID: NORMAL
RAD ONC ARIA COURSE INTENT: NORMAL
RAD ONC ARIA COURSE LAST TREATMENT DATE: NORMAL
RAD ONC ARIA COURSE SESSION NUMBER: 14
RAD ONC ARIA COURSE START DATE: NORMAL
RAD ONC ARIA COURSE TREATMENT ELAPSED DAYS: 20
RAD ONC ARIA PLAN FRACTIONS TREATED TO DATE: 14
RAD ONC ARIA PLAN ID: NORMAL
RAD ONC ARIA PLAN PRESCRIBED DOSE PER FRACTION: 2 GY
RAD ONC ARIA PLAN PRIMARY REFERENCE POINT: NORMAL
RAD ONC ARIA PLAN TOTAL FRACTIONS PRESCRIBED: 25
RAD ONC ARIA PLAN TOTAL PRESCRIBED DOSE: 5000 CGY
RAD ONC ARIA REFERENCE POINT DOSAGE GIVEN TO DATE: 28 GY
RAD ONC ARIA REFERENCE POINT ID: NORMAL
RAD ONC ARIA REFERENCE POINT SESSION DOSAGE GIVEN: 2 GY

## 2024-09-11 PROCEDURE — 77385 HC NTSTY MODUL RAD TX DLVR SMPL: CPT

## 2024-09-12 ENCOUNTER — HOSPITAL ENCOUNTER (OUTPATIENT)
Dept: RADIATION ONCOLOGY | Age: 54
Setting detail: RECURRING SERIES
Discharge: HOME | End: 2024-09-15
Payer: COMMERCIAL

## 2024-09-12 LAB
RAD ONC ARIA COURSE FIRST TREATMENT DATE: NORMAL
RAD ONC ARIA COURSE ID: NORMAL
RAD ONC ARIA COURSE INTENT: NORMAL
RAD ONC ARIA COURSE LAST TREATMENT DATE: NORMAL
RAD ONC ARIA COURSE SESSION NUMBER: 15
RAD ONC ARIA COURSE START DATE: NORMAL
RAD ONC ARIA COURSE TREATMENT ELAPSED DAYS: 21
RAD ONC ARIA PLAN FRACTIONS TREATED TO DATE: 15
RAD ONC ARIA PLAN ID: NORMAL
RAD ONC ARIA PLAN PRESCRIBED DOSE PER FRACTION: 2 GY
RAD ONC ARIA PLAN PRIMARY REFERENCE POINT: NORMAL
RAD ONC ARIA PLAN TOTAL FRACTIONS PRESCRIBED: 25
RAD ONC ARIA PLAN TOTAL PRESCRIBED DOSE: 5000 CGY
RAD ONC ARIA REFERENCE POINT DOSAGE GIVEN TO DATE: 30 GY
RAD ONC ARIA REFERENCE POINT ID: NORMAL
RAD ONC ARIA REFERENCE POINT SESSION DOSAGE GIVEN: 2 GY

## 2024-09-12 PROCEDURE — 77385 HC NTSTY MODUL RAD TX DLVR SMPL: CPT

## 2024-09-13 ENCOUNTER — HOSPITAL ENCOUNTER (OUTPATIENT)
Dept: RADIATION ONCOLOGY | Age: 54
Setting detail: RECURRING SERIES
Discharge: HOME | End: 2024-09-16
Payer: COMMERCIAL

## 2024-09-13 LAB
RAD ONC ARIA COURSE FIRST TREATMENT DATE: NORMAL
RAD ONC ARIA COURSE ID: NORMAL
RAD ONC ARIA COURSE INTENT: NORMAL
RAD ONC ARIA COURSE LAST TREATMENT DATE: NORMAL
RAD ONC ARIA COURSE SESSION NUMBER: 16
RAD ONC ARIA COURSE START DATE: NORMAL
RAD ONC ARIA COURSE TREATMENT ELAPSED DAYS: 22
RAD ONC ARIA PLAN FRACTIONS TREATED TO DATE: 16
RAD ONC ARIA PLAN ID: NORMAL
RAD ONC ARIA PLAN PRESCRIBED DOSE PER FRACTION: 2 GY
RAD ONC ARIA PLAN PRIMARY REFERENCE POINT: NORMAL
RAD ONC ARIA PLAN TOTAL FRACTIONS PRESCRIBED: 25
RAD ONC ARIA PLAN TOTAL PRESCRIBED DOSE: 5000 CGY
RAD ONC ARIA REFERENCE POINT DOSAGE GIVEN TO DATE: 32 GY
RAD ONC ARIA REFERENCE POINT ID: NORMAL
RAD ONC ARIA REFERENCE POINT SESSION DOSAGE GIVEN: 2 GY

## 2024-09-13 PROCEDURE — 77385 HC NTSTY MODUL RAD TX DLVR SMPL: CPT

## 2024-09-13 PROCEDURE — 77336 RADIATION PHYSICS CONSULT: CPT

## 2024-09-16 ENCOUNTER — HOSPITAL ENCOUNTER (OUTPATIENT)
Dept: RADIATION ONCOLOGY | Age: 54
Setting detail: RECURRING SERIES
Discharge: HOME | End: 2024-09-19
Payer: COMMERCIAL

## 2024-09-16 LAB
RAD ONC ARIA COURSE FIRST TREATMENT DATE: NORMAL
RAD ONC ARIA COURSE ID: NORMAL
RAD ONC ARIA COURSE INTENT: NORMAL
RAD ONC ARIA COURSE LAST TREATMENT DATE: NORMAL
RAD ONC ARIA COURSE SESSION NUMBER: 17
RAD ONC ARIA COURSE START DATE: NORMAL
RAD ONC ARIA COURSE TREATMENT ELAPSED DAYS: 25
RAD ONC ARIA PLAN FRACTIONS TREATED TO DATE: 17
RAD ONC ARIA PLAN ID: NORMAL
RAD ONC ARIA PLAN PRESCRIBED DOSE PER FRACTION: 2 GY
RAD ONC ARIA PLAN PRIMARY REFERENCE POINT: NORMAL
RAD ONC ARIA PLAN TOTAL FRACTIONS PRESCRIBED: 25
RAD ONC ARIA PLAN TOTAL PRESCRIBED DOSE: 5000 CGY
RAD ONC ARIA REFERENCE POINT DOSAGE GIVEN TO DATE: 34 GY
RAD ONC ARIA REFERENCE POINT ID: NORMAL
RAD ONC ARIA REFERENCE POINT SESSION DOSAGE GIVEN: 2 GY

## 2024-09-16 PROCEDURE — 77385 HC NTSTY MODUL RAD TX DLVR SMPL: CPT

## 2024-09-17 ENCOUNTER — HOSPITAL ENCOUNTER (OUTPATIENT)
Dept: RADIATION ONCOLOGY | Age: 54
Setting detail: RECURRING SERIES
Discharge: HOME | End: 2024-09-20
Payer: COMMERCIAL

## 2024-09-17 LAB
RAD ONC ARIA COURSE FIRST TREATMENT DATE: NORMAL
RAD ONC ARIA COURSE ID: NORMAL
RAD ONC ARIA COURSE INTENT: NORMAL
RAD ONC ARIA COURSE LAST TREATMENT DATE: NORMAL
RAD ONC ARIA COURSE SESSION NUMBER: 18
RAD ONC ARIA COURSE START DATE: NORMAL
RAD ONC ARIA COURSE TREATMENT ELAPSED DAYS: 26
RAD ONC ARIA PLAN FRACTIONS TREATED TO DATE: 18
RAD ONC ARIA PLAN ID: NORMAL
RAD ONC ARIA PLAN PRESCRIBED DOSE PER FRACTION: 2 GY
RAD ONC ARIA PLAN PRIMARY REFERENCE POINT: NORMAL
RAD ONC ARIA PLAN TOTAL FRACTIONS PRESCRIBED: 25
RAD ONC ARIA PLAN TOTAL PRESCRIBED DOSE: 5000 CGY
RAD ONC ARIA REFERENCE POINT DOSAGE GIVEN TO DATE: 36 GY
RAD ONC ARIA REFERENCE POINT ID: NORMAL
RAD ONC ARIA REFERENCE POINT SESSION DOSAGE GIVEN: 2 GY

## 2024-09-17 PROCEDURE — 77385 HC NTSTY MODUL RAD TX DLVR SMPL: CPT

## 2024-09-18 ENCOUNTER — HOSPITAL ENCOUNTER (OUTPATIENT)
Dept: RADIATION ONCOLOGY | Age: 54
Setting detail: RECURRING SERIES
Discharge: HOME | End: 2024-09-21
Payer: COMMERCIAL

## 2024-09-18 LAB
RAD ONC ARIA COURSE FIRST TREATMENT DATE: NORMAL
RAD ONC ARIA COURSE ID: NORMAL
RAD ONC ARIA COURSE INTENT: NORMAL
RAD ONC ARIA COURSE LAST TREATMENT DATE: NORMAL
RAD ONC ARIA COURSE SESSION NUMBER: 19
RAD ONC ARIA COURSE START DATE: NORMAL
RAD ONC ARIA COURSE TREATMENT ELAPSED DAYS: 27
RAD ONC ARIA PLAN FRACTIONS TREATED TO DATE: 19
RAD ONC ARIA PLAN ID: NORMAL
RAD ONC ARIA PLAN PRESCRIBED DOSE PER FRACTION: 2 GY
RAD ONC ARIA PLAN PRIMARY REFERENCE POINT: NORMAL
RAD ONC ARIA PLAN TOTAL FRACTIONS PRESCRIBED: 25
RAD ONC ARIA PLAN TOTAL PRESCRIBED DOSE: 5000 CGY
RAD ONC ARIA REFERENCE POINT DOSAGE GIVEN TO DATE: 38 GY
RAD ONC ARIA REFERENCE POINT ID: NORMAL
RAD ONC ARIA REFERENCE POINT SESSION DOSAGE GIVEN: 2 GY

## 2024-09-18 PROCEDURE — 77385 HC NTSTY MODUL RAD TX DLVR SMPL: CPT

## 2024-09-19 ENCOUNTER — HOSPITAL ENCOUNTER (OUTPATIENT)
Dept: RADIATION ONCOLOGY | Age: 54
Setting detail: RECURRING SERIES
Discharge: HOME | End: 2024-09-22
Payer: COMMERCIAL

## 2024-09-19 LAB
RAD ONC ARIA COURSE FIRST TREATMENT DATE: NORMAL
RAD ONC ARIA COURSE ID: NORMAL
RAD ONC ARIA COURSE INTENT: NORMAL
RAD ONC ARIA COURSE LAST TREATMENT DATE: NORMAL
RAD ONC ARIA COURSE SESSION NUMBER: 20
RAD ONC ARIA COURSE START DATE: NORMAL
RAD ONC ARIA COURSE TREATMENT ELAPSED DAYS: 28
RAD ONC ARIA PLAN FRACTIONS TREATED TO DATE: 20
RAD ONC ARIA PLAN ID: NORMAL
RAD ONC ARIA PLAN PRESCRIBED DOSE PER FRACTION: 2 GY
RAD ONC ARIA PLAN PRIMARY REFERENCE POINT: NORMAL
RAD ONC ARIA PLAN TOTAL FRACTIONS PRESCRIBED: 25
RAD ONC ARIA PLAN TOTAL PRESCRIBED DOSE: 5000 CGY
RAD ONC ARIA REFERENCE POINT DOSAGE GIVEN TO DATE: 40 GY
RAD ONC ARIA REFERENCE POINT ID: NORMAL
RAD ONC ARIA REFERENCE POINT SESSION DOSAGE GIVEN: 2 GY

## 2024-09-19 PROCEDURE — 77385 HC NTSTY MODUL RAD TX DLVR SMPL: CPT

## 2024-09-20 ENCOUNTER — HOSPITAL ENCOUNTER (OUTPATIENT)
Dept: RADIATION ONCOLOGY | Age: 54
Setting detail: RECURRING SERIES
Discharge: HOME | End: 2024-09-23
Payer: COMMERCIAL

## 2024-09-20 LAB
RAD ONC ARIA COURSE FIRST TREATMENT DATE: NORMAL
RAD ONC ARIA COURSE ID: NORMAL
RAD ONC ARIA COURSE INTENT: NORMAL
RAD ONC ARIA COURSE LAST TREATMENT DATE: NORMAL
RAD ONC ARIA COURSE SESSION NUMBER: 21
RAD ONC ARIA COURSE START DATE: NORMAL
RAD ONC ARIA COURSE TREATMENT ELAPSED DAYS: 29
RAD ONC ARIA PLAN FRACTIONS TREATED TO DATE: 21
RAD ONC ARIA PLAN ID: NORMAL
RAD ONC ARIA PLAN PRESCRIBED DOSE PER FRACTION: 2 GY
RAD ONC ARIA PLAN PRIMARY REFERENCE POINT: NORMAL
RAD ONC ARIA PLAN TOTAL FRACTIONS PRESCRIBED: 25
RAD ONC ARIA PLAN TOTAL PRESCRIBED DOSE: 5000 CGY
RAD ONC ARIA REFERENCE POINT DOSAGE GIVEN TO DATE: 42 GY
RAD ONC ARIA REFERENCE POINT ID: NORMAL
RAD ONC ARIA REFERENCE POINT SESSION DOSAGE GIVEN: 2 GY

## 2024-09-20 PROCEDURE — 77385 HC NTSTY MODUL RAD TX DLVR SMPL: CPT

## 2024-09-23 ENCOUNTER — HOSPITAL ENCOUNTER (OUTPATIENT)
Dept: RADIATION ONCOLOGY | Age: 54
Setting detail: RECURRING SERIES
Discharge: HOME | End: 2024-09-26
Payer: COMMERCIAL

## 2024-09-23 LAB
RAD ONC ARIA COURSE FIRST TREATMENT DATE: NORMAL
RAD ONC ARIA COURSE ID: NORMAL
RAD ONC ARIA COURSE INTENT: NORMAL
RAD ONC ARIA COURSE LAST TREATMENT DATE: NORMAL
RAD ONC ARIA COURSE SESSION NUMBER: 22
RAD ONC ARIA COURSE START DATE: NORMAL
RAD ONC ARIA COURSE TREATMENT ELAPSED DAYS: 32
RAD ONC ARIA PLAN FRACTIONS TREATED TO DATE: 22
RAD ONC ARIA PLAN ID: NORMAL
RAD ONC ARIA PLAN PRESCRIBED DOSE PER FRACTION: 2 GY
RAD ONC ARIA PLAN PRIMARY REFERENCE POINT: NORMAL
RAD ONC ARIA PLAN TOTAL FRACTIONS PRESCRIBED: 25
RAD ONC ARIA PLAN TOTAL PRESCRIBED DOSE: 5000 CGY
RAD ONC ARIA REFERENCE POINT DOSAGE GIVEN TO DATE: 44 GY
RAD ONC ARIA REFERENCE POINT ID: NORMAL
RAD ONC ARIA REFERENCE POINT SESSION DOSAGE GIVEN: 2 GY

## 2024-09-23 PROCEDURE — 77336 RADIATION PHYSICS CONSULT: CPT

## 2024-09-23 PROCEDURE — 77385 HC NTSTY MODUL RAD TX DLVR SMPL: CPT

## 2024-09-23 RX ORDER — TRIAMCINOLONE ACETONIDE 1 MG/G
CREAM TOPICAL
Qty: 15 G | Refills: 0 | Status: SHIPPED | OUTPATIENT
Start: 2024-09-23

## 2024-09-24 ENCOUNTER — HOSPITAL ENCOUNTER (OUTPATIENT)
Dept: RADIATION ONCOLOGY | Age: 54
Setting detail: RECURRING SERIES
Discharge: HOME | End: 2024-09-27
Payer: COMMERCIAL

## 2024-09-24 LAB
RAD ONC ARIA COURSE FIRST TREATMENT DATE: NORMAL
RAD ONC ARIA COURSE ID: NORMAL
RAD ONC ARIA COURSE INTENT: NORMAL
RAD ONC ARIA COURSE LAST TREATMENT DATE: NORMAL
RAD ONC ARIA COURSE SESSION NUMBER: 23
RAD ONC ARIA COURSE START DATE: NORMAL
RAD ONC ARIA COURSE TREATMENT ELAPSED DAYS: 33
RAD ONC ARIA PLAN FRACTIONS TREATED TO DATE: 23
RAD ONC ARIA PLAN ID: NORMAL
RAD ONC ARIA PLAN PRESCRIBED DOSE PER FRACTION: 2 GY
RAD ONC ARIA PLAN PRIMARY REFERENCE POINT: NORMAL
RAD ONC ARIA PLAN TOTAL FRACTIONS PRESCRIBED: 25
RAD ONC ARIA PLAN TOTAL PRESCRIBED DOSE: 5000 CGY
RAD ONC ARIA REFERENCE POINT DOSAGE GIVEN TO DATE: 46 GY
RAD ONC ARIA REFERENCE POINT ID: NORMAL
RAD ONC ARIA REFERENCE POINT SESSION DOSAGE GIVEN: 2 GY

## 2024-09-24 PROCEDURE — 77385 HC NTSTY MODUL RAD TX DLVR SMPL: CPT

## 2024-09-25 ENCOUNTER — HOSPITAL ENCOUNTER (OUTPATIENT)
Dept: RADIATION ONCOLOGY | Age: 54
Setting detail: RECURRING SERIES
Discharge: HOME | End: 2024-09-28
Payer: COMMERCIAL

## 2024-09-25 LAB
RAD ONC ARIA COURSE FIRST TREATMENT DATE: NORMAL
RAD ONC ARIA COURSE ID: NORMAL
RAD ONC ARIA COURSE INTENT: NORMAL
RAD ONC ARIA COURSE LAST TREATMENT DATE: NORMAL
RAD ONC ARIA COURSE SESSION NUMBER: 24
RAD ONC ARIA COURSE START DATE: NORMAL
RAD ONC ARIA COURSE TREATMENT ELAPSED DAYS: 34
RAD ONC ARIA PLAN FRACTIONS TREATED TO DATE: 24
RAD ONC ARIA PLAN ID: NORMAL
RAD ONC ARIA PLAN PRESCRIBED DOSE PER FRACTION: 2 GY
RAD ONC ARIA PLAN PRIMARY REFERENCE POINT: NORMAL
RAD ONC ARIA PLAN TOTAL FRACTIONS PRESCRIBED: 25
RAD ONC ARIA PLAN TOTAL PRESCRIBED DOSE: 5000 CGY
RAD ONC ARIA REFERENCE POINT DOSAGE GIVEN TO DATE: 48 GY
RAD ONC ARIA REFERENCE POINT ID: NORMAL
RAD ONC ARIA REFERENCE POINT SESSION DOSAGE GIVEN: 2 GY

## 2024-09-26 ENCOUNTER — HOSPITAL ENCOUNTER (OUTPATIENT)
Dept: RADIATION ONCOLOGY | Age: 54
Setting detail: RECURRING SERIES
Discharge: HOME | End: 2024-09-29
Payer: COMMERCIAL

## 2024-09-26 LAB
RAD ONC ARIA COURSE FIRST TREATMENT DATE: NORMAL
RAD ONC ARIA COURSE ID: NORMAL
RAD ONC ARIA COURSE INTENT: NORMAL
RAD ONC ARIA COURSE LAST TREATMENT DATE: NORMAL
RAD ONC ARIA COURSE SESSION NUMBER: 25
RAD ONC ARIA COURSE START DATE: NORMAL
RAD ONC ARIA COURSE TREATMENT ELAPSED DAYS: 35
RAD ONC ARIA PLAN FRACTIONS TREATED TO DATE: 25
RAD ONC ARIA PLAN ID: NORMAL
RAD ONC ARIA PLAN PRESCRIBED DOSE PER FRACTION: 2 GY
RAD ONC ARIA PLAN PRIMARY REFERENCE POINT: NORMAL
RAD ONC ARIA PLAN TOTAL FRACTIONS PRESCRIBED: 25
RAD ONC ARIA PLAN TOTAL PRESCRIBED DOSE: 5000 CGY
RAD ONC ARIA REFERENCE POINT DOSAGE GIVEN TO DATE: 50 GY
RAD ONC ARIA REFERENCE POINT ID: NORMAL
RAD ONC ARIA REFERENCE POINT SESSION DOSAGE GIVEN: 2 GY

## 2024-09-26 PROCEDURE — 77385 HC NTSTY MODUL RAD TX DLVR SMPL: CPT

## 2024-10-03 ENCOUNTER — HOSPITAL ENCOUNTER (OUTPATIENT)
Dept: RADIATION ONCOLOGY | Age: 54
Setting detail: RECURRING SERIES
Discharge: HOME | End: 2024-10-06

## 2024-10-03 NOTE — PROGRESS NOTES
Pt came in for skin check post RT.  She completed RT 9-26-24.  She is using Domeboro soaks once daily and Silvadene to open areas.  Using coconut oil to moisturize.   The rash she had to right upper chest is now on left lower chest area.  She c/o itching.  She will use Triamcinolone to this area.  She will continue Silvadene to open areas to right chest wall/axilla that are healing.  Pt will call next week if she feels she needs to be seen again.  They have not had power in her house which she feels the heat may be delaying  Her wound healing.     Angela Bennett, RN

## 2024-10-29 ENCOUNTER — OFFICE VISIT (OUTPATIENT)
Dept: ORTHOPEDIC SURGERY | Age: 54
End: 2024-10-29
Payer: COMMERCIAL

## 2024-10-29 DIAGNOSIS — M17.12 ARTHRITIS OF LEFT KNEE: Primary | ICD-10-CM

## 2024-10-29 PROCEDURE — 99213 OFFICE O/P EST LOW 20 MIN: CPT | Performed by: ORTHOPAEDIC SURGERY

## 2024-10-29 PROCEDURE — 20610 DRAIN/INJ JOINT/BURSA W/O US: CPT | Performed by: ORTHOPAEDIC SURGERY

## 2024-10-29 RX ORDER — TRIAMCINOLONE ACETONIDE 40 MG/ML
40 INJECTION, SUSPENSION INTRA-ARTICULAR; INTRAMUSCULAR ONCE
Status: COMPLETED | OUTPATIENT
Start: 2024-10-29 | End: 2024-10-29

## 2024-10-29 RX ADMIN — TRIAMCINOLONE ACETONIDE 40 MG: 40 INJECTION, SUSPENSION INTRA-ARTICULAR; INTRAMUSCULAR at 15:34

## 2024-10-29 NOTE — PROGRESS NOTES
Name: Allison Luke  YOB: 1970  Gender: female  MRN: [id number]      CC: Knee Pain (L)       HPI: Allison Luke is a 54 y.o. female who returns for follow up on her left knee. She was last seen in 2024 after her 3rd gel injections. She states she underwent a double mastectomy and radiation and has not been able to treat her knee. She does not feel the gel injections helped her much. She did get good benefit from cortisone injections in the past she describes pain with her knee terminally extended as well as standing for prolonged period of time.        Physical Examination:  General: no acute distress  Lungs: breathing easily  CV: regular rhythm by pulse  Left Knee: Tenderness palpation medial joint line pain at the extremes of flexion and extension.  Pain with Young's medial joint line        Assessment:     ICD-10-CM    1. Arthritis of left knee  M17.12 DRAIN/INJECT LARGE JOINT/BURSA     triamcinolone acetonide (KENALOG-40) injection 40 mg          Plan:   Chronic left knee pain with arthritic changes she did not respond very well to the Visco in the past but cortisone has helped her.  We discussed quad strengthening and provided home exercise program for that today we also repeated her cortisone injection.  If she is not improving we will consider referral for total joint arthroplasty.    Before beginning this procedure, the procedure was explained in detail. The patient's name and  were confirmed. The body part and laterality were identified and agreed upon by the patient and myself.  The patient elected to proceed with an intraarticular knee injection today.  After verbal informed consent was obtained after sterile prep the Left knee  was injected from a anterior lateral approach with 4 cc of 0.25% Marcaine and 1 cc of 40 mg Kenalog.  The patient tolerated the procedure well was given postinjection flare precautions.             Thong Diaz MD, [facility]

## 2024-11-05 NOTE — PROGRESS NOTES
[facility] Hematology and Oncology: Established patient - follow up     Chief Complaint   Patient presents with    Follow-up     Referral Diagnosis: Neoplasm of right breast, primary tumor staging category Tis: lobular carcinoma in situ (LCIS)  Referring Provider: Roderick Juárez Jr., MD  Primary Care Provider: Berto Daigle DO  Family History of Cancer/ Hematology Disorders: None reported   Presenting Symptoms: Abnormal routine screening mammogram     History of Present Illness:  Ms. Luke is a 54 y.o. female who presents today for follow up regarding breast disease.  The past medical history is below.    - At consultation, we discussed the pathophysiology of breast cancer, staging, and the importance of receptor status in terms of treatment options.  We then reviewed her medical history as well as oncologic history, recent imaging and pathology in detail.  We discussed role of chemoprevention and risks associated with LCIS.    - follow-up.  Interim events noted.  At last appointment abnormality was palpated and patient was sent for imaging which confirmed need for bx.  Path c/w lobular invasive disease.  She is scheduled for mastectomy on 6/20.  Will proceed with CT CAP as a stat prior to surgery tomorrow.  She is in agreement with this plan and wishes to know baseline prior to proceeding with surgery.  No other issues or concerns at this time.  She has made peace with the decision of pursuing mastectomies.  Pathology reviewed in detail.  She was here with her daughter and .  We discussed the pathology and pathophysiology and staging of breast cancer.  We discussed the role of endocrine therapy as well as chemotherapy in the future.  Role of Oncotype DX discussed and it's predictive/prognostic value.  No other issues or concerns at this time.  7/2024 - here for follow-up after bilateral mastectomies.  She tolerated the surgery very well and is healing nicely.  Mild seroma noted over left chest wall

## 2024-11-07 ENCOUNTER — OFFICE VISIT (OUTPATIENT)
Dept: FAMILY MEDICINE CLINIC | Facility: CLINIC | Age: 54
End: 2024-11-07
Payer: COMMERCIAL

## 2024-11-07 VITALS
HEIGHT: 61 IN | SYSTOLIC BLOOD PRESSURE: 146 MMHG | WEIGHT: 192 LBS | BODY MASS INDEX: 36.25 KG/M2 | DIASTOLIC BLOOD PRESSURE: 88 MMHG | TEMPERATURE: 98.4 F | HEART RATE: 116 BPM

## 2024-11-07 DIAGNOSIS — J01.11 ACUTE RECURRENT FRONTAL SINUSITIS: Primary | ICD-10-CM

## 2024-11-07 PROCEDURE — 3079F DIAST BP 80-89 MM HG: CPT | Performed by: FAMILY MEDICINE

## 2024-11-07 PROCEDURE — 99213 OFFICE O/P EST LOW 20 MIN: CPT | Performed by: FAMILY MEDICINE

## 2024-11-07 PROCEDURE — 3077F SYST BP >= 140 MM HG: CPT | Performed by: FAMILY MEDICINE

## 2024-11-07 RX ORDER — AZITHROMYCIN 250 MG/1
250 TABLET, FILM COATED ORAL DAILY
Qty: 6 TABLET | Refills: 0 | Status: SHIPPED | OUTPATIENT
Start: 2024-11-07

## 2024-11-07 RX ORDER — FLUTICASONE PROPIONATE 50 MCG
2 SPRAY, SUSPENSION (ML) NASAL DAILY
Qty: 1 EACH | Refills: 11 | Status: SHIPPED | OUTPATIENT
Start: 2024-11-07

## 2024-11-07 RX ORDER — GUAIFENESIN AND DEXTROMETHORPHAN HYDROBROMIDE 1200; 60 MG/1; MG/1
1 TABLET, EXTENDED RELEASE ORAL EVERY 12 HOURS
Qty: 20 TABLET | Refills: 0 | Status: SHIPPED | OUTPATIENT
Start: 2024-11-07 | End: 2024-11-17

## 2024-11-07 ASSESSMENT — ENCOUNTER SYMPTOMS
WHEEZING: 0
NAUSEA: 0
SORE THROAT: 0
COUGH: 0
RHINORRHEA: 1
VOICE CHANGE: 0
VOMITING: 0
SINUS PRESSURE: 1

## 2024-11-07 NOTE — PROGRESS NOTES
PROGRESS NOTE    SUBJECTIVE:   Allison Luke is a 54 y.o. female seen for a follow up visit regarding the following chief complaint:     Chief Complaint   Patient presents with    Other     Sxs started 2 wks ago. Cough, congestion.            HPI patient presents office with 2-week history of cough congestion clear rhinorrhea now getting yellow and green status post bilateral mastectomy secondary to breast cancer presently doing chemo      Past Medical History, Past Surgical History, Family history, Social History, and Medications were all reviewed with the patient today and updated as necessary.       Current Outpatient Medications   Medication Sig Dispense Refill    azithromycin (ZITHROMAX) 250 MG tablet Take 1 tablet by mouth daily Take 2 Tablets with food by mouth first day, then 1 tab with food by mouth daily for days 2 through 5. 6 tablet 0    Dextromethorphan-guaiFENesin (MUCINEX DM MAXIMUM STRENGTH)  MG TB12 Take 1 tablet by mouth in the morning and 1 tablet in the evening. Do all this for 10 days. 20 tablet 0    fluticasone (FLONASE) 50 MCG/ACT nasal spray 2 sprays by Each Nostril route daily 1 each 11    triamcinolone (KENALOG) 0.1 % cream Apply topically 3-5 times daily for areas of skin itching. 15 g 0    silver sulfADIAZINE (SILVADENE) 1 % cream Apply topically 2 times daily 25 g 3    ondansetron (ZOFRAN) 8 MG tablet Take 1 tablet by mouth every 8 hours as needed for Nausea or Vomiting 90 tablet 0    mirabegron (MYRBETRIQ) 50 MG TB24 Take 50 mg by mouth daily 90 tablet 1    valsartan (DIOVAN) 160 MG tablet Take 1 tablet by mouth daily 90 tablet 3    traZODone (DESYREL) 50 MG tablet Take 2 tablets by mouth nightly Take 2 tab at bedtime 180 tablet 3    albuterol sulfate HFA (PROVENTIL;VENTOLIN;PROAIR) 108 (90 Base) MCG/ACT inhaler TAKE 2 PUFFS BY MOUTH EVERY 4 HOURS 18 g 5    tamoxifen (NOLVADEX) 10 MG tablet Take 1 tablet by mouth daily 90 tablet 3    loratadine (CLARITIN) 10 MG capsule

## 2024-11-11 ENCOUNTER — OFFICE VISIT (OUTPATIENT)
Dept: ONCOLOGY | Age: 54
End: 2024-11-11
Payer: COMMERCIAL

## 2024-11-11 ENCOUNTER — HOSPITAL ENCOUNTER (OUTPATIENT)
Dept: LAB | Age: 54
Discharge: HOME | End: 2024-11-11
Payer: COMMERCIAL

## 2024-11-11 VITALS
SYSTOLIC BLOOD PRESSURE: 175 MMHG | RESPIRATION RATE: 16 BRPM | BODY MASS INDEX: 35.68 KG/M2 | HEIGHT: 61 IN | OXYGEN SATURATION: 94 % | WEIGHT: 189 LBS | TEMPERATURE: 98.6 F | DIASTOLIC BLOOD PRESSURE: 103 MMHG | HEART RATE: 105 BPM

## 2024-11-11 DIAGNOSIS — C50.411 MALIGNANT NEOPLASM OF UPPER-OUTER QUADRANT OF RIGHT BREAST IN FEMALE, ESTROGEN RECEPTOR POSITIVE (HCC): Primary | ICD-10-CM

## 2024-11-11 DIAGNOSIS — Z90.13 HISTORY OF BILATERAL MASTECTOMY: ICD-10-CM

## 2024-11-11 DIAGNOSIS — Z17.0 MALIGNANT NEOPLASM OF UPPER-OUTER QUADRANT OF RIGHT BREAST IN FEMALE, ESTROGEN RECEPTOR POSITIVE (HCC): Primary | ICD-10-CM

## 2024-11-11 DIAGNOSIS — C50.912 LOBULAR CARCINOMA OF LEFT BREAST (HCC): ICD-10-CM

## 2024-11-11 LAB
ALBUMIN SERPL-MCNC: 3.9 G/DL (ref 3.5–5)
ALBUMIN/GLOB SERPL: 1 (ref 1–1.9)
ALP SERPL-CCNC: 74 U/L (ref 35–104)
ALT SERPL-CCNC: 43 U/L (ref 8–45)
ANION GAP SERPL CALC-SCNC: 12 MMOL/L (ref 7–16)
AST SERPL-CCNC: 29 U/L (ref 15–37)
BASOPHILS # BLD: 0.1 K/UL (ref 0–0.2)
BASOPHILS NFR BLD: 1 % (ref 0–2)
BILIRUB SERPL-MCNC: 0.6 MG/DL (ref 0–1.2)
BUN SERPL-MCNC: 14 MG/DL (ref 6–23)
CALCIUM SERPL-MCNC: 9.3 MG/DL (ref 8.8–10.2)
CHLORIDE SERPL-SCNC: 102 MMOL/L (ref 98–107)
CO2 SERPL-SCNC: 26 MMOL/L (ref 20–29)
CREAT SERPL-MCNC: 0.83 MG/DL (ref 0.6–1.1)
DIFFERENTIAL METHOD BLD: ABNORMAL
EOSINOPHIL # BLD: 0.2 K/UL (ref 0–0.8)
EOSINOPHIL NFR BLD: 2 % (ref 0.5–7.8)
ERYTHROCYTE [DISTWIDTH] IN BLOOD BY AUTOMATED COUNT: 14 % (ref 11.9–14.6)
GLOBULIN SER CALC-MCNC: 3.8 G/DL (ref 2.3–3.5)
GLUCOSE SERPL-MCNC: 132 MG/DL (ref 70–99)
HCT VFR BLD AUTO: 48.9 % (ref 35.8–46.3)
HGB BLD-MCNC: 16.3 G/DL (ref 11.7–15.4)
IMM GRANULOCYTES # BLD AUTO: 0.1 K/UL (ref 0–0.5)
IMM GRANULOCYTES NFR BLD AUTO: 1 % (ref 0–5)
LYMPHOCYTES # BLD: 1 K/UL (ref 0.5–4.6)
LYMPHOCYTES NFR BLD: 14 % (ref 13–44)
MCH RBC QN AUTO: 31.8 PG (ref 26.1–32.9)
MCHC RBC AUTO-ENTMCNC: 33.3 G/DL (ref 31.4–35)
MCV RBC AUTO: 95.5 FL (ref 82–102)
MONOCYTES # BLD: 0.5 K/UL (ref 0.1–1.3)
MONOCYTES NFR BLD: 7 % (ref 4–12)
NEUTS SEG # BLD: 5.3 K/UL (ref 1.7–8.2)
NEUTS SEG NFR BLD: 75 % (ref 43–78)
NRBC # BLD: 0 K/UL (ref 0–0.2)
PLATELET # BLD AUTO: 259 K/UL (ref 150–450)
PMV BLD AUTO: 10.6 FL (ref 9.4–12.3)
POTASSIUM SERPL-SCNC: 3.5 MMOL/L (ref 3.5–5.1)
PROT SERPL-MCNC: 7.7 G/DL (ref 6.3–8.2)
RBC # BLD AUTO: 5.12 M/UL (ref 4.05–5.2)
SODIUM SERPL-SCNC: 140 MMOL/L (ref 136–145)
WBC # BLD AUTO: 7.1 K/UL (ref 4.3–11.1)

## 2024-11-11 PROCEDURE — 36415 COLL VENOUS BLD VENIPUNCTURE: CPT

## 2024-11-11 PROCEDURE — 80053 COMPREHEN METABOLIC PANEL: CPT

## 2024-11-11 PROCEDURE — 85025 COMPLETE CBC W/AUTO DIFF WBC: CPT

## 2024-11-11 PROCEDURE — 3080F DIAST BP >= 90 MM HG: CPT | Performed by: INTERNAL MEDICINE

## 2024-11-11 PROCEDURE — 99215 OFFICE O/P EST HI 40 MIN: CPT | Performed by: INTERNAL MEDICINE

## 2024-11-11 PROCEDURE — 3077F SYST BP >= 140 MM HG: CPT | Performed by: INTERNAL MEDICINE

## 2024-11-11 ASSESSMENT — PATIENT HEALTH QUESTIONNAIRE - PHQ9
SUM OF ALL RESPONSES TO PHQ9 QUESTIONS 1 & 2: 0
2. FEELING DOWN, DEPRESSED OR HOPELESS: NOT AT ALL
SUM OF ALL RESPONSES TO PHQ QUESTIONS 1-9: 0
SUM OF ALL RESPONSES TO PHQ QUESTIONS 1-9: 0
1. LITTLE INTEREST OR PLEASURE IN DOING THINGS: NOT AT ALL
SUM OF ALL RESPONSES TO PHQ QUESTIONS 1-9: 0
SUM OF ALL RESPONSES TO PHQ QUESTIONS 1-9: 0

## 2024-11-11 NOTE — PATIENT INSTRUCTIONS
Patient Information from Today's Visit    The members of your [facility]cology [facility] are listed below:    Physician Provider: Ines Hilario, Medical Oncologist  Advanced Practice Clinician: Marianna Hair NP  Registered Nurse: Gerda OATES RN  Navigator: Tasha CHIN RN  Medical Assistant: Jenise ANTONIO MA  : Rani ALANIS   Supportive Care Services: Suhail LEOS LMSW    Diagnosis: Breast      Follow Up Instructions: 2-3 months.    Labs reviewed.  Symptoms reviewed.  Discussed endocrine therapy - tamoxifen versus versus aromatase inhibitor (letrozole, anastrozole, or exemestane) + ovarian suppression.  Information provided on endocrine therapy options.  Let us know what you decide.  Recommend Weleda calendula diaper cream for skin rash.      Just want to remind you that you will need to have your lab work drawn at one of our outreach lab locations 24-72 hours prior to your upcoming follow up appointment scheduled at [facility] Hematology and Oncology.      You may visit any of the 4 outreach lab locations, during hours of operation, at a time that is convenient for you as long as it is within the '24-72 hour prior' window.      1)   [facility]  [address]   Phone: [phone]  Mon - Fri 730am - 430pm     2)   [facility]  [address]  Phone: [phone]  Mon - Fri 730am - 430pm     3)   [facility]  [address]  Phone: [phone]  Mon - Fri 700am - 430pm     4)   [facility]   [address], [address]  Phone: [phone]  Mon - Fri 730am - 430pm         Treatment Summary has been discussed and given to patient:N/A      Current Labs:   Hospital Outpatient Visit on 11/11/2024   Component Date Value Ref Range Status    Sodium 11/11/2024 140  136 - 145 mmol/L Final    Potassium 11/11/2024 3.5  3.5 - 5.1 mmol/L Final    Chloride 11/11/2024 102  98 - 107 mmol/L Final    CO2 11/11/2024 26  20 -

## 2024-11-13 DIAGNOSIS — Z17.0 MALIGNANT NEOPLASM OF UPPER-OUTER QUADRANT OF RIGHT BREAST IN FEMALE, ESTROGEN RECEPTOR POSITIVE (HCC): Primary | ICD-10-CM

## 2024-11-13 DIAGNOSIS — C50.411 MALIGNANT NEOPLASM OF UPPER-OUTER QUADRANT OF RIGHT BREAST IN FEMALE, ESTROGEN RECEPTOR POSITIVE (HCC): Primary | ICD-10-CM

## 2024-11-14 RX ORDER — TAMOXIFEN CITRATE 20 MG/1
20 TABLET ORAL DAILY
Qty: 90 TABLET | Refills: 3 | Status: SHIPPED | OUTPATIENT
Start: 2024-11-14

## 2024-11-15 DIAGNOSIS — C50.411 MALIGNANT NEOPLASM OF UPPER-OUTER QUADRANT OF RIGHT BREAST IN FEMALE, ESTROGEN RECEPTOR POSITIVE (HCC): Primary | ICD-10-CM

## 2024-11-15 DIAGNOSIS — Z17.0 MALIGNANT NEOPLASM OF UPPER-OUTER QUADRANT OF RIGHT BREAST IN FEMALE, ESTROGEN RECEPTOR POSITIVE (HCC): Primary | ICD-10-CM

## 2024-11-19 PROBLEM — R92.8 ABNORMAL MAMMOGRAM OF RIGHT BREAST: Status: RESOLVED | Noted: 2023-12-15 | Resolved: 2024-11-19

## 2024-11-25 ENCOUNTER — CLINICAL DOCUMENTATION (OUTPATIENT)
Dept: CASE MANAGEMENT | Age: 54
End: 2024-11-25

## 2024-11-25 NOTE — PROGRESS NOTES
New patient is a 54 y.o.  who is seen for surgical consultation on bilateral salpingo-oophorectomy.   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   2024 diagnosed caner in lymph nodes. Bilateral mastectomy on 24.   Finished radiated on 24.   Cancer was hormonal based and estrogen level is currently high.  History of hysterectomy 2007 ( [facility]) thinks tubes were taken    HISTORY:    No LMP recorded. Patient has had a hysterectomy.  Sexual History:  has sex with males  Contraception:  status post hysterectomy  Current Outpatient Medications on File Prior to Visit   Medication Sig Dispense Refill    tamoxifen (NOLVADEX) 20 MG tablet Take 1 tablet by mouth daily 90 tablet 3    azithromycin (ZITHROMAX) 250 MG tablet Take 1 tablet by mouth daily Take 2 Tablets with food by mouth first day, then 1 tab with food by mouth daily for days 2 through 5. 6 tablet 0    fluticasone (FLONASE) 50 MCG/ACT nasal spray 2 sprays by Each Nostril route daily (Patient not taking: Reported on 2024) 1 each 11    mirabegron (MYRBETRIQ) 50 MG TB24 Take 50 mg by mouth daily 90 tablet 1    valsartan (DIOVAN) 160 MG tablet Take 1 tablet by mouth daily 90 tablet 3    albuterol sulfate HFA (PROVENTIL;VENTOLIN;PROAIR) 108 (90 Base) MCG/ACT inhaler TAKE 2 PUFFS BY MOUTH EVERY 4 HOURS 18 g 5    tamoxifen (NOLVADEX) 10 MG tablet Take 1 tablet by mouth daily (Patient not taking: Reported on 2024) 90 tablet 3    loratadine (CLARITIN) 10 MG capsule Take 1 capsule by mouth daily       Current Facility-Administered Medications on File Prior to Visit   Medication Dose Route Frequency Provider Last Rate Last Admin    diatrizoate meglumine-sodium (GASTROGRAFIN) 66-10 % solution 15 mL  15 mL Oral ONCE PRN Ines Hilario MD   15 mL at 24 0847       ROS:  Feeling well. No dyspnea or chest pain on exertion.  No abdominal pain, change in bowel habits, black or bloody stools.  No

## 2024-11-25 NOTE — PROGRESS NOTES
Survivorship care plan completed and available in the chart. A copy has been shared with PCP. Navigation is available as needed.

## 2024-11-26 ENCOUNTER — OFFICE VISIT (OUTPATIENT)
Dept: OBGYN CLINIC | Age: 54
End: 2024-11-26
Payer: COMMERCIAL

## 2024-11-26 ENCOUNTER — TELEPHONE (OUTPATIENT)
Dept: OBGYN CLINIC | Age: 54
End: 2024-11-26

## 2024-11-26 VITALS — WEIGHT: 193.5 LBS | HEIGHT: 61 IN | BODY MASS INDEX: 36.53 KG/M2

## 2024-11-26 DIAGNOSIS — C50.919 MALIGNANT NEOPLASM OF BREAST IN FEMALE, ESTROGEN RECEPTOR POSITIVE, UNSPECIFIED LATERALITY, UNSPECIFIED SITE OF BREAST (HCC): Primary | ICD-10-CM

## 2024-11-26 DIAGNOSIS — Z17.0 MALIGNANT NEOPLASM OF BREAST IN FEMALE, ESTROGEN RECEPTOR POSITIVE, UNSPECIFIED LATERALITY, UNSPECIFIED SITE OF BREAST (HCC): Primary | ICD-10-CM

## 2024-11-26 PROCEDURE — 99203 OFFICE O/P NEW LOW 30 MIN: CPT | Performed by: OBSTETRICS & GYNECOLOGY

## 2024-11-26 NOTE — TELEPHONE ENCOUNTER
Called pt to get scheduled for surgery w/ . Offered 12/23/24 which pt accepted. Will start process of getting scheduled and send pt HALFPOPS message w/ f/u info.

## 2024-12-04 DIAGNOSIS — N39.41 URGE INCONTINENCE: ICD-10-CM

## 2024-12-05 RX ORDER — MIRABEGRON 50 MG/1
50 TABLET, FILM COATED, EXTENDED RELEASE ORAL DAILY
Qty: 90 TABLET | Refills: 3 | Status: SHIPPED | OUTPATIENT
Start: 2024-12-05

## 2024-12-09 ENCOUNTER — OFFICE VISIT (OUTPATIENT)
Dept: OBGYN CLINIC | Age: 54
End: 2024-12-09

## 2024-12-09 VITALS — HEIGHT: 61 IN | WEIGHT: 193.4 LBS | BODY MASS INDEX: 36.51 KG/M2

## 2024-12-09 DIAGNOSIS — Z01.818 PRE-OP EXAM: Primary | ICD-10-CM

## 2024-12-09 NOTE — PROGRESS NOTES
Preop Visit    Ms. Luke presents for a preop visit.  She is scheduled for a  Laparoscopic BSO scheduled for 12/23/24 . Her history, meds, and allergies were reviewed.  The procedure was reviewed in detail as well as the risks of bleeding, infection, DVT and potential surgical complications involving the bladder, ureters, colon or intestines.  Also the alternatives,  benefits, recovery and follow-up. Prevention of SSI discussed as indicated.  All of her questions were answered.  She understands she will be menopausal after her surgery.   Exam:  Lungs CTAB  Heart RRR    See the hospital H&P as well as prior chart for details.

## 2024-12-10 NOTE — PROGRESS NOTES
[facility] Cancer [facility] - Initial Evaluation   Patient: Allison Luke   YOB: 1970      Location: [facility] HEMATOLOGY AND ONCOLOGY - Provider participated in today's evaluation via telemedicine in [place], [place]. [place] completed today's evaluation from [place].   Date of Evaluation: 1/2/2025      Referral Source: Ines Hilario MD   Referral Reason: C50.912 (ICD-10-CM) - Lobular carcinoma of left breast (HCC)       Genetic Counselor: Maryann New MS, [facility]      Allison Luke was referred for evaluation for the potential of hereditary cancer due to her personal history. Allison has consented to a visit via telehealth.   Personalized risk assessment was performed and genetic testing options were reviewed.  For full details, please see Risk Assessment and Discussion in this document.  Following genetic counseling, Allison's action plan is:    PURSUES TESTING: Ms. Luke opted to proceed with testing, but wanted to take some time to speak with her family regarding which testing panel to proceed with.      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 Tumor markers were ER and DC positive.     Hormonal history:  Age of Menarche: 13  Age of First Live Birth: 19  Age of Menopause: Ovaries removed  Hormonal Replacement Therapy: Denies    Screening history:  Latest colonoscopy: 2022, was due last December  History of polyps: former diagnosis of ulcerative colitis (in question now), did have some polyps.  Last dermatological exam: Saw in July, will have yearly visits.    Social history:  Tobacco use: Denies  Alcohol use: Denies    General medical history:  Past Medical History:   Diagnosis Date    Atypical ductal hyperplasia of right breast     per pt benign    Breast cancer (HCC)     Chronic pain     R knee    Colitis     Depression     HTN (hypertension)     managed with med    Overactive

## 2024-12-12 DIAGNOSIS — Z17.0 MALIGNANT NEOPLASM OF UPPER-OUTER QUADRANT OF RIGHT BREAST IN FEMALE, ESTROGEN RECEPTOR POSITIVE (HCC): ICD-10-CM

## 2024-12-12 DIAGNOSIS — C50.411 MALIGNANT NEOPLASM OF UPPER-OUTER QUADRANT OF RIGHT BREAST IN FEMALE, ESTROGEN RECEPTOR POSITIVE (HCC): ICD-10-CM

## 2024-12-12 DIAGNOSIS — Z90.13 HISTORY OF BILATERAL MASTECTOMY: Primary | ICD-10-CM

## 2024-12-18 RX ORDER — TRAZODONE HYDROCHLORIDE 50 MG/1
50 TABLET, FILM COATED ORAL NIGHTLY
COMMUNITY

## 2024-12-18 RX ORDER — PREGABALIN 75 MG/1
75 CAPSULE ORAL DAILY
COMMUNITY

## 2024-12-18 NOTE — PROGRESS NOTES
Patient verified name and .  Order for consent  was not found in EHR   Type 2 surgery, Phone assessment complete.  Orders not received.  Labs per surgeon: none  Labs per anesthesia protocol: HGB - will need done DOS    Patient answered medical/surgical history questions at their best of ability. All prior to admission medications documented in EPIC.    Patient instructed to continue taking all prescription medications up to the day of surgery but to take only the following medications the day of surgery according to anesthesia guidelines with a small sip of water: CLARITIN, LYRICA, MYRBETRIQ.      Patient informed that all vitamins and supplements should be held 7 days prior to surgery and NSAIDS 5 days prior to surgery. Prescription meds to hold:NONE    Patient instructed on the following:    > Arrive at [facility], time of arrival to be called the day before by 1700  > NPO after midnight, unless otherwise indicated, including gum, mints, and ice chips - EXCEPT - please drink 32 ounces of non-caffeinated clear liquids 2 hours prior to your arrival to avoid dehydration.   > Responsible adult must drive patient to the hospital, stay during surgery, and patient will need supervision 24 hours after anesthesia  > Use non moisturizing soap in shower the night before surgery and on the morning of surgery  > All piercings must be removed prior to arrival.    > Leave all valuables (money and jewelry) at home but bring insurance card and ID on DOS.   > Do not wear make-up, nail polish, lotions, cologne, perfumes, powders, or oil on skin. Artificial nails are not permitted.

## 2024-12-21 NOTE — PROGRESS NOTES
Subjective:     Allison Luke, MRN: [id number], is a 54 y.o.  female presents with desire to have both ovaries removed. Has had breast cancer and bilat mastectomy, pos receptors. . unchanged course. See office notes on care.    Patient Active Problem List    Diagnosis Date Noted    Acute medial meniscus tear, left, initial encounter 05/24/2022    Breast cancer (HCC) 12/23/2024    Lobular carcinoma of left breast (HCC) 07/23/2024    History of bilateral mastectomy 07/18/2024    Lymphedema 07/18/2024    Breast cancer in female (HCC) 06/21/2024    Lobular carcinoma in situ of right breast 06/13/2024    Prophylactic breast removal 06/13/2024    Lobular carcinoma in situ (LCIS) of right breast 03/14/2024    Dense breast tissue 03/14/2024    Major depressive disorder, single episode, in full remission ([facility]) 04/10/2023    Left knee pain 05/26/2022    KELSEA (obstructive sleep apnea) 09/02/2021    Obesity (BMI 30.0-34.9) 09/02/2021    Nocturnal hypoxemia 09/02/2021    Hypertension 09/02/2021    Mild depression 07/24/2018    Psychophysiological insomnia 12/27/2017    Reactive depression 12/27/2017     Past Medical History:   Diagnosis Date    Atypical ductal hyperplasia of right breast     per pt benign    Breast cancer (HCC)     Chronic pain     R knee    Colitis     Depression     HTN (hypertension)     managed with med    Overactive bladder 2008    I take medicine for this.    Sleep apnea     unable to tolerate cpap machine    Ulcerative colitis (HCC)     not currently on medication      Past Surgical History:   Procedure Laterality Date    AXILLARY SURGERY Right 06/21/2024    RIGHT AXILLARY SENTINEL LYMPH NODE BIOPSY WITH MAGSEED LOCALIZED  EXCISION OF RIGHT AXILLARY NODE Pre-Op    6:30 am  Lympho   8:00 am  OR          9:50 am performed by Roderick Juárez Jr., [place] [facility] [place]    BREAST BIOPSY Right     RIGHT BREAST NEEDLE LOCALIZED BIOPSY performed by Gideon Sandoval MD [place] [facility] [place]    BREAST BIOPSY

## 2024-12-22 ENCOUNTER — ANESTHESIA EVENT (OUTPATIENT)
Dept: SURGERY | Age: 54
End: 2024-12-22
Payer: COMMERCIAL

## 2024-12-22 RX ORDER — FENTANYL CITRATE 50 UG/ML
100 INJECTION, SOLUTION INTRAMUSCULAR; INTRAVENOUS
Status: CANCELLED | OUTPATIENT
Start: 2024-12-22 | End: 2024-12-23

## 2024-12-22 RX ORDER — MIDAZOLAM HYDROCHLORIDE 2 MG/2ML
2 INJECTION, SOLUTION INTRAMUSCULAR; INTRAVENOUS
Status: CANCELLED | OUTPATIENT
Start: 2024-12-22 | End: 2024-12-23

## 2024-12-23 ENCOUNTER — ANESTHESIA (OUTPATIENT)
Dept: SURGERY | Age: 54
End: 2024-12-23
Payer: COMMERCIAL

## 2024-12-23 ENCOUNTER — PREP FOR PROCEDURE (OUTPATIENT)
Dept: OBGYN CLINIC | Age: 54
End: 2024-12-23

## 2024-12-23 ENCOUNTER — HOSPITAL ENCOUNTER (OUTPATIENT)
Age: 54
Setting detail: OUTPATIENT SURGERY
Discharge: HOME | End: 2024-12-23
Attending: OBSTETRICS & GYNECOLOGY | Admitting: OBSTETRICS & GYNECOLOGY
Payer: COMMERCIAL

## 2024-12-23 VITALS
HEIGHT: 61 IN | TEMPERATURE: 98.2 F | HEART RATE: 77 BPM | WEIGHT: 191.3 LBS | RESPIRATION RATE: 16 BRPM | OXYGEN SATURATION: 96 % | BODY MASS INDEX: 36.12 KG/M2 | DIASTOLIC BLOOD PRESSURE: 88 MMHG | SYSTOLIC BLOOD PRESSURE: 143 MMHG

## 2024-12-23 DIAGNOSIS — C50.912 LOBULAR CARCINOMA OF LEFT BREAST (HCC): Primary | ICD-10-CM

## 2024-12-23 PROBLEM — C50.919 BREAST CANCER (HCC): Status: RESOLVED | Noted: 2024-12-23 | Resolved: 2024-12-23

## 2024-12-23 LAB — HGB BLD-MCNC: 14.7 G/DL (ref 11.7–15.4)

## 2024-12-23 PROCEDURE — 6360000002 HC RX W HCPCS: Performed by: STUDENT IN AN ORGANIZED HEALTH CARE EDUCATION/TRAINING PROGRAM

## 2024-12-23 PROCEDURE — 6370000000 HC RX 637 (ALT 250 FOR IP): Performed by: STUDENT IN AN ORGANIZED HEALTH CARE EDUCATION/TRAINING PROGRAM

## 2024-12-23 PROCEDURE — 2709999900 HC NON-CHARGEABLE SUPPLY: Performed by: OBSTETRICS & GYNECOLOGY

## 2024-12-23 PROCEDURE — 6360000002 HC RX W HCPCS: Performed by: OBSTETRICS & GYNECOLOGY

## 2024-12-23 PROCEDURE — 3700000001 HC ADD 15 MINUTES (ANESTHESIA): Performed by: OBSTETRICS & GYNECOLOGY

## 2024-12-23 PROCEDURE — 2500000003 HC RX 250 WO HCPCS

## 2024-12-23 PROCEDURE — 3600000004 HC SURGERY LEVEL 4 BASE: Performed by: OBSTETRICS & GYNECOLOGY

## 2024-12-23 PROCEDURE — 7100000011 HC PHASE II RECOVERY - ADDTL 15 MIN: Performed by: OBSTETRICS & GYNECOLOGY

## 2024-12-23 PROCEDURE — 3700000000 HC ANESTHESIA ATTENDED CARE: Performed by: OBSTETRICS & GYNECOLOGY

## 2024-12-23 PROCEDURE — 2500000003 HC RX 250 WO HCPCS: Performed by: OBSTETRICS & GYNECOLOGY

## 2024-12-23 PROCEDURE — 2580000003 HC RX 258: Performed by: STUDENT IN AN ORGANIZED HEALTH CARE EDUCATION/TRAINING PROGRAM

## 2024-12-23 PROCEDURE — 7100000000 HC PACU RECOVERY - FIRST 15 MIN: Performed by: OBSTETRICS & GYNECOLOGY

## 2024-12-23 PROCEDURE — 6360000002 HC RX W HCPCS

## 2024-12-23 PROCEDURE — 6370000000 HC RX 637 (ALT 250 FOR IP): Performed by: ANESTHESIOLOGY

## 2024-12-23 PROCEDURE — 6360000002 HC RX W HCPCS: Performed by: ANESTHESIOLOGY

## 2024-12-23 PROCEDURE — 3600000014 HC SURGERY LEVEL 4 ADDTL 15MIN: Performed by: OBSTETRICS & GYNECOLOGY

## 2024-12-23 PROCEDURE — 85018 HEMOGLOBIN: CPT

## 2024-12-23 PROCEDURE — 2580000003 HC RX 258

## 2024-12-23 PROCEDURE — 7100000001 HC PACU RECOVERY - ADDTL 15 MIN: Performed by: OBSTETRICS & GYNECOLOGY

## 2024-12-23 PROCEDURE — 2720000010 HC SURG SUPPLY STERILE: Performed by: OBSTETRICS & GYNECOLOGY

## 2024-12-23 PROCEDURE — 7100000010 HC PHASE II RECOVERY - FIRST 15 MIN: Performed by: OBSTETRICS & GYNECOLOGY

## 2024-12-23 RX ORDER — EPHEDRINE SULFATE 5 MG/ML
INJECTION INTRAVENOUS
Status: DISCONTINUED | OUTPATIENT
Start: 2024-12-23 | End: 2024-12-23 | Stop reason: SDUPTHER

## 2024-12-23 RX ORDER — NEOSTIGMINE METHYLSULFATE 1 MG/ML
INJECTION, SOLUTION INTRAVENOUS
Status: DISCONTINUED | OUTPATIENT
Start: 2024-12-23 | End: 2024-12-23 | Stop reason: SDUPTHER

## 2024-12-23 RX ORDER — SODIUM CHLORIDE 0.9 % (FLUSH) 0.9 %
5-40 SYRINGE (ML) INJECTION PRN
Status: DISCONTINUED | OUTPATIENT
Start: 2024-12-23 | End: 2024-12-23 | Stop reason: HOSPADM

## 2024-12-23 RX ORDER — SODIUM CHLORIDE 0.9 % (FLUSH) 0.9 %
5-40 SYRINGE (ML) INJECTION EVERY 12 HOURS SCHEDULED
Status: DISCONTINUED | OUTPATIENT
Start: 2024-12-23 | End: 2024-12-23 | Stop reason: HOSPADM

## 2024-12-23 RX ORDER — SODIUM CHLORIDE 0.9 % (FLUSH) 0.9 %
5-40 SYRINGE (ML) INJECTION EVERY 12 HOURS SCHEDULED
Status: DISCONTINUED | OUTPATIENT
Start: 2024-12-23 | End: 2024-12-23 | Stop reason: SDUPTHER

## 2024-12-23 RX ORDER — ACETAMINOPHEN 500 MG
1000 TABLET ORAL ONCE
Status: COMPLETED | OUTPATIENT
Start: 2024-12-23 | End: 2024-12-23

## 2024-12-23 RX ORDER — DIPHENHYDRAMINE HYDROCHLORIDE 50 MG/ML
12.5 INJECTION INTRAMUSCULAR; INTRAVENOUS
Status: DISCONTINUED | OUTPATIENT
Start: 2024-12-23 | End: 2024-12-23 | Stop reason: HOSPADM

## 2024-12-23 RX ORDER — ONDANSETRON 2 MG/ML
4 INJECTION INTRAMUSCULAR; INTRAVENOUS
Status: COMPLETED | OUTPATIENT
Start: 2024-12-23 | End: 2024-12-23

## 2024-12-23 RX ORDER — SCOLOPAMINE TRANSDERMAL SYSTEM 1 MG/1
1 PATCH, EXTENDED RELEASE TRANSDERMAL ONCE
Status: DISCONTINUED | OUTPATIENT
Start: 2024-12-23 | End: 2024-12-23 | Stop reason: HOSPADM

## 2024-12-23 RX ORDER — SODIUM CHLORIDE, SODIUM LACTATE, POTASSIUM CHLORIDE, CALCIUM CHLORIDE 600; 310; 30; 20 MG/100ML; MG/100ML; MG/100ML; MG/100ML
INJECTION, SOLUTION INTRAVENOUS CONTINUOUS
Status: DISCONTINUED | OUTPATIENT
Start: 2024-12-23 | End: 2024-12-23 | Stop reason: HOSPADM

## 2024-12-23 RX ORDER — PROPOFOL 10 MG/ML
INJECTION, EMULSION INTRAVENOUS
Status: DISCONTINUED | OUTPATIENT
Start: 2024-12-23 | End: 2024-12-23 | Stop reason: SDUPTHER

## 2024-12-23 RX ORDER — ONDANSETRON 2 MG/ML
INJECTION INTRAMUSCULAR; INTRAVENOUS
Status: DISCONTINUED | OUTPATIENT
Start: 2024-12-23 | End: 2024-12-23 | Stop reason: SDUPTHER

## 2024-12-23 RX ORDER — ROCURONIUM BROMIDE 10 MG/ML
INJECTION, SOLUTION INTRAVENOUS
Status: DISCONTINUED | OUTPATIENT
Start: 2024-12-23 | End: 2024-12-23 | Stop reason: SDUPTHER

## 2024-12-23 RX ORDER — SODIUM CHLORIDE, SODIUM LACTATE, POTASSIUM CHLORIDE, CALCIUM CHLORIDE 600; 310; 30; 20 MG/100ML; MG/100ML; MG/100ML; MG/100ML
INJECTION, SOLUTION INTRAVENOUS
Status: DISCONTINUED | OUTPATIENT
Start: 2024-12-23 | End: 2024-12-23 | Stop reason: SDUPTHER

## 2024-12-23 RX ORDER — DEXAMETHASONE SODIUM PHOSPHATE 4 MG/ML
INJECTION, SOLUTION INTRA-ARTICULAR; INTRALESIONAL; INTRAMUSCULAR; INTRAVENOUS; SOFT TISSUE
Status: DISCONTINUED | OUTPATIENT
Start: 2024-12-23 | End: 2024-12-23 | Stop reason: SDUPTHER

## 2024-12-23 RX ORDER — OXYCODONE HYDROCHLORIDE 5 MG/1
5 TABLET ORAL
Status: DISCONTINUED | OUTPATIENT
Start: 2024-12-23 | End: 2024-12-23 | Stop reason: HOSPADM

## 2024-12-23 RX ORDER — OXYCODONE AND ACETAMINOPHEN 5; 325 MG/1; MG/1
1 TABLET ORAL EVERY 6 HOURS PRN
Qty: 12 TABLET | Refills: 0 | Status: SHIPPED | OUTPATIENT
Start: 2024-12-23 | End: 2024-12-26

## 2024-12-23 RX ORDER — BUPIVACAINE HYDROCHLORIDE AND EPINEPHRINE 5; 5 MG/ML; UG/ML
INJECTION, SOLUTION EPIDURAL; INTRACAUDAL; PERINEURAL PRN
Status: DISCONTINUED | OUTPATIENT
Start: 2024-12-23 | End: 2024-12-23 | Stop reason: HOSPADM

## 2024-12-23 RX ORDER — MIDAZOLAM HYDROCHLORIDE 1 MG/ML
INJECTION, SOLUTION INTRAMUSCULAR; INTRAVENOUS
Status: DISCONTINUED | OUTPATIENT
Start: 2024-12-23 | End: 2024-12-23 | Stop reason: SDUPTHER

## 2024-12-23 RX ORDER — FENTANYL CITRATE 50 UG/ML
INJECTION, SOLUTION INTRAMUSCULAR; INTRAVENOUS
Status: DISCONTINUED | OUTPATIENT
Start: 2024-12-23 | End: 2024-12-23 | Stop reason: SDUPTHER

## 2024-12-23 RX ORDER — SODIUM CHLORIDE 9 MG/ML
INJECTION, SOLUTION INTRAVENOUS PRN
Status: DISCONTINUED | OUTPATIENT
Start: 2024-12-23 | End: 2024-12-23 | Stop reason: SDUPTHER

## 2024-12-23 RX ORDER — GLYCOPYRROLATE 0.2 MG/ML
INJECTION INTRAMUSCULAR; INTRAVENOUS
Status: DISCONTINUED | OUTPATIENT
Start: 2024-12-23 | End: 2024-12-23 | Stop reason: SDUPTHER

## 2024-12-23 RX ORDER — KETAMINE HCL IN NACL, ISO-OSM 20 MG/2 ML
SYRINGE (ML) INJECTION
Status: DISCONTINUED | OUTPATIENT
Start: 2024-12-23 | End: 2024-12-23 | Stop reason: SDUPTHER

## 2024-12-23 RX ORDER — LIDOCAINE HYDROCHLORIDE 20 MG/ML
INJECTION, SOLUTION EPIDURAL; INFILTRATION; INTRACAUDAL; PERINEURAL
Status: DISCONTINUED | OUTPATIENT
Start: 2024-12-23 | End: 2024-12-23 | Stop reason: SDUPTHER

## 2024-12-23 RX ORDER — NALOXONE HYDROCHLORIDE 0.4 MG/ML
INJECTION, SOLUTION INTRAMUSCULAR; INTRAVENOUS; SUBCUTANEOUS PRN
Status: DISCONTINUED | OUTPATIENT
Start: 2024-12-23 | End: 2024-12-23 | Stop reason: HOSPADM

## 2024-12-23 RX ORDER — SODIUM CHLORIDE 0.9 % (FLUSH) 0.9 %
5-40 SYRINGE (ML) INJECTION PRN
Status: DISCONTINUED | OUTPATIENT
Start: 2024-12-23 | End: 2024-12-23 | Stop reason: SDUPTHER

## 2024-12-23 RX ORDER — SODIUM CHLORIDE 9 MG/ML
INJECTION, SOLUTION INTRAVENOUS PRN
Status: DISCONTINUED | OUTPATIENT
Start: 2024-12-23 | End: 2024-12-23 | Stop reason: HOSPADM

## 2024-12-23 RX ADMIN — ONDANSETRON 4 MG: 2 INJECTION INTRAMUSCULAR; INTRAVENOUS at 10:46

## 2024-12-23 RX ADMIN — FENTANYL CITRATE 50 MCG: 50 INJECTION, SOLUTION INTRAMUSCULAR; INTRAVENOUS at 10:28

## 2024-12-23 RX ADMIN — GLYCOPYRROLATE 0.6 MG: 0.2 INJECTION INTRAMUSCULAR; INTRAVENOUS at 10:46

## 2024-12-23 RX ADMIN — PROPOFOL 200 MG: 10 INJECTION, EMULSION INTRAVENOUS at 09:59

## 2024-12-23 RX ADMIN — SUGAMMADEX 100 MG: 100 INJECTION, SOLUTION INTRAVENOUS at 10:57

## 2024-12-23 RX ADMIN — DEXAMETHASONE SODIUM PHOSPHATE 10 MG: 4 INJECTION INTRA-ARTICULAR; INTRALESIONAL; INTRAMUSCULAR; INTRAVENOUS; SOFT TISSUE at 10:13

## 2024-12-23 RX ADMIN — Medication 4 MG: at 10:46

## 2024-12-23 RX ADMIN — EPHEDRINE SULFATE 10 MG: 5 INJECTION INTRAVENOUS at 10:22

## 2024-12-23 RX ADMIN — CEFAZOLIN 2000 MG: 2 INJECTION, POWDER, FOR SOLUTION INTRAMUSCULAR; INTRAVENOUS at 10:11

## 2024-12-23 RX ADMIN — LIDOCAINE HYDROCHLORIDE 100 MG: 20 INJECTION, SOLUTION EPIDURAL; INFILTRATION; INTRACAUDAL; PERINEURAL at 09:59

## 2024-12-23 RX ADMIN — ACETAMINOPHEN 1000 MG: 500 TABLET, FILM COATED ORAL at 08:30

## 2024-12-23 RX ADMIN — SODIUM CHLORIDE, SODIUM LACTATE, POTASSIUM CHLORIDE, AND CALCIUM CHLORIDE: 600; 310; 30; 20 INJECTION, SOLUTION INTRAVENOUS at 09:47

## 2024-12-23 RX ADMIN — FENTANYL CITRATE 50 MCG: 50 INJECTION, SOLUTION INTRAMUSCULAR; INTRAVENOUS at 10:00

## 2024-12-23 RX ADMIN — Medication 20 MG: at 10:28

## 2024-12-23 RX ADMIN — ROCURONIUM BROMIDE 30 MG: 10 INJECTION, SOLUTION INTRAVENOUS at 10:00

## 2024-12-23 RX ADMIN — MIDAZOLAM 2 MG: 1 INJECTION INTRAMUSCULAR; INTRAVENOUS at 09:55

## 2024-12-23 RX ADMIN — ONDANSETRON 4 MG: 2 INJECTION, SOLUTION INTRAMUSCULAR; INTRAVENOUS at 12:11

## 2024-12-23 RX ADMIN — SODIUM CHLORIDE 150 MG: 9 INJECTION, SOLUTION INTRAVENOUS at 09:28

## 2024-12-23 NOTE — BRIEF OP NOTE
BRIEF OP NOTE  Pre-Op Diagnosis: Breast cancer (HCC) [C50.919]  Post-Op Diagnosis: same  Procedure: Procedure(s):  LAPAROSCOPIC BILATERAL SALPINGO OOPHORECTOMY    Surgeon: VALERI Garza MD  Assistant(s): DR MARICEL Mcdaniel  Anesthesia: GETA   Estimated Blood Loss: <5cc  Specimens:   ID Type Source Tests Collected by Time Destination   A : Bilateral tubes and ovaries. Tissue Fallopian Tube SURGICAL PATHOLOGY Moreno Garza MD 12/23/2024 1047       Findings: normal bilateral tubes and ovaries, light left pelvic adhesions  Complications: none

## 2024-12-23 NOTE — ANESTHESIA PRE PROCEDURE
Department of Anesthesiology  Preprocedure Note       Name:  Allison Luke   Age:  54 y.o.  :  1970                                          MRN:  [id number]         Date:  2024      Surgeon: Surgeon(s):  Moreno Garza MD Alt, Brandi K, DO    Procedure: Procedure(s):  LAPAROSCOPIC BILATERAL SALPINGO OOPHORECTOMY    Medications prior to admission:   Prior to Admission medications    Medication Sig Start Date End Date Taking? Authorizing Provider   traZODone (DESYREL) 50 MG tablet Take 1 tablet by mouth nightly   Yes ProviderHeather MD   pregabalin (LYRICA) 75 MG capsule Take 1 capsule by mouth daily.   Yes ProviderHeather MD   mirabegron (MYRBETRIQ) 50 MG TB24 TAKE 50 MG BY MOUTH DAILY 24  Yes Berto Daigle DO   valsartan (DIOVAN) 160 MG tablet Take 1 tablet by mouth daily 24  Yes Berto Daigle DO   albuterol sulfate HFA (PROVENTIL;VENTOLIN;PROAIR) 108 (90 Base) MCG/ACT inhaler TAKE 2 PUFFS BY MOUTH EVERY 4 HOURS 24  Yes Berto Daigle DO   loratadine (CLARITIN) 10 MG capsule Take 1 capsule by mouth daily   Yes ProviderHeather MD   Misc. Devices MISC 1 each by Does not apply route once as needed (As many mastectomy bras and breast prosthetics as insurance allows.) 24  Ines Hilario MD   tamoxifen (NOLVADEX) 20 MG tablet Take 1 tablet by mouth daily  Patient not taking: Reported on 2024   Ines Hilario MD   tamoxifen (NOLVADEX) 10 MG tablet Take 1 tablet by mouth daily  Patient not taking: Reported on 2024 4/10/24   Marianna Kyle, APRN - CNP       Current medications:    Current Facility-Administered Medications   Medication Dose Route Frequency Provider Last Rate Last Admin    ceFAZolin (ANCEF) 2,000 mg in sterile water 20 mL IV syringe  2,000 mg IntraVENous On Call to OR Moreno Garza MD        lactated ringers infusion   IntraVENous Continuous Constantine Randolph MD        sodium

## 2024-12-23 NOTE — ANESTHESIA POSTPROCEDURE EVALUATION
Department of Anesthesiology  Postprocedure Note    Patient: Allison Luke  MRN: [id number]  YOB: 1970  Date of evaluation: 12/23/2024    Procedure Summary       Date: 12/23/24 [place] [facility] [place] [facility]    Anesthesia Start: 0947 Anesthesia Stop: 1106    Procedure: LAPAROSCOPIC BILATERAL SALPINGO OOPHORECTOMY (Bilateral: Abdomen) Diagnosis:       Breast cancer (HCC)      (Breast cancer (HCC) [C50.919])    Surgeons: Moreno Garza MD Responsible Provider: Gideon Keita MD    Anesthesia Type: general ASA Status: 2            Anesthesia Type: No value filed.    Mojgan Phase I: Mojgan Score: 10    Mojgan Phase II:      Anesthesia Post Evaluation    Patient location during evaluation: bedside  Patient participation: complete - patient participated  Level of consciousness: awake and alert  Airway patency: patent  Nausea & Vomiting: no vomiting  Cardiovascular status: hemodynamically stable  Respiratory status: acceptable  Hydration status: euvolemic  Pain management: adequate    No notable events documented.

## 2024-12-23 NOTE — DISCHARGE INSTRUCTIONS
of heart failure or if you experience any of the following symptoms:  Weight gain of 3 pounds or more overnight or 5 pounds in a week, increased swelling in our hands or feet or shortness of breath while lying flat in bed.  Please call your doctor as soon as you notice any of these symptoms; do not wait until your next office visit.

## 2024-12-24 NOTE — OP NOTE
[facility] [address]  [address]                            OPERATIVE REPORT      PATIENT NAME: SEDRICK FRANCISCO         : 1970  MED REC NO: [id number]                       ROOM: [facility] NO: [id number]                       ADMIT DATE: 2024  PROVIDER: Moreno Garza MD    DATE OF SERVICE:  2024    PREOPERATIVE DIAGNOSES:  History of breast cancer and desire for removal of ovaries.    POSTOPERATIVE DIAGNOSES:  History of breast cancer and desire for removal of ovaries.    PROCEDURES PERFORMED:  Laparoscopic bilateral salpingo-oophorectomy.    SURGEON:  Moreno Garza MD    ASSISTANT:  Dr. Nancy Mcdaniel.    ANESTHESIA:  General.    ESTIMATED BLOOD LOSS:  Less than 5 mL.    SPECIMENS REMOVED:  Tissue, bilateral fallopian tubes and ovaries.    INTRAOPERATIVE FINDINGS:  Normal bilateral tubes and ovaries, light pelvic adhesions, left.     COMPLICATIONS:  None.    IMPLANTS:  None.    INDICATIONS:  ***    DESCRIPTION OF PROCEDURE:  After informed consent, the patient was taken to the operating room and given general anesthesia.  She was prepped and draped in the usual sterile fashion in dorsal lithotomy position.  Time-out was accomplished.  The umbilical area was injected with dilute Marcaine.  #11 blade was used to make a 5 mm incision transversely.  Through this, the Veress needle was passed, 3.5 L of CO2 were infiltrated.   Veress needle was withdrawn, 5 mm disposable trocar was passed through this incision without difficulty, obturator was removed and laparoscope placed through the sleeve.  The patient placed in Trendelenburg and it was deemed possible to continue with laparoscopic approach.  A 5 mm port was placed in the right lower quadrant under direct visualization again using Marcaine and on the left side exact same thing except 10 mm port was placed.  The blue handle graspers were used to pull the

## 2025-01-02 ENCOUNTER — TELEMEDICINE (OUTPATIENT)
Dept: ONCOLOGY | Age: 55
End: 2025-01-02

## 2025-01-02 DIAGNOSIS — Z17.0 MALIGNANT NEOPLASM OF UPPER-OUTER QUADRANT OF RIGHT BREAST IN FEMALE, ESTROGEN RECEPTOR POSITIVE (HCC): Primary | ICD-10-CM

## 2025-01-02 DIAGNOSIS — C50.912 LOBULAR CARCINOMA OF LEFT BREAST (HCC): ICD-10-CM

## 2025-01-02 DIAGNOSIS — Z80.0 FAMILY HISTORY OF MALIGNANT NEOPLASM OF GASTROINTESTINAL TRACT: ICD-10-CM

## 2025-01-02 DIAGNOSIS — C50.411 MALIGNANT NEOPLASM OF UPPER-OUTER QUADRANT OF RIGHT BREAST IN FEMALE, ESTROGEN RECEPTOR POSITIVE (HCC): Primary | ICD-10-CM

## 2025-01-02 DIAGNOSIS — Z80.3 FAMILY HISTORY OF MALIGNANT NEOPLASM OF BREAST: ICD-10-CM

## 2025-01-07 ENCOUNTER — OFFICE VISIT (OUTPATIENT)
Dept: OBGYN CLINIC | Age: 55
End: 2025-01-07

## 2025-01-07 VITALS — HEIGHT: 61 IN | BODY MASS INDEX: 36.65 KG/M2 | WEIGHT: 194.1 LBS

## 2025-01-07 DIAGNOSIS — Z98.890 POST-OPERATIVE STATE: Primary | ICD-10-CM

## 2025-01-07 PROCEDURE — 99024 POSTOP FOLLOW-UP VISIT: CPT | Performed by: OBSTETRICS & GYNECOLOGY

## 2025-01-07 RX ORDER — CLOTRIMAZOLE AND BETAMETHASONE DIPROPIONATE 10; .64 MG/G; MG/G
CREAM TOPICAL
Qty: 45 G | Refills: 5 | Status: SHIPPED | OUTPATIENT
Start: 2025-01-07

## 2025-01-07 NOTE — PROGRESS NOTES
Allison presents for postop visit from LAPAROSCOPIC BILATERAL SALPINGO OOPHORECTOMY  about 2 weeks ago.  Doing well postoperatively.without any bleeding.  Fever: NO Voiding well: YES.  Bowel movements OK: YES.     Pt believes she has a yeast infection on the skin on her lower abdomen and groin. Has been using Calmoseptine cream w/ little relief. Took an  diflucan tablet Saturday w/ little improvement.     FINAL PATHOLOGIC DIAGNOSIS   A:  Bilateral tubes and ovaries, bilateral salpingo-oophorectomy:   Benign bilateral ovaries, one with follicle cysts.   Benign bilateral fallopian tubes.     Exam: A&OX3, NAD.  Abdomen:  Non tender Incisions clean, dry, intact X 3  Has inter triginous yeast. Rx for lotrisone    A/P.  Stable Post op condition.  Gradually increase activity. Resumption of sexual activity is  encouraged at this time.  Follow up 1 year.  Return if has sig menopause sxs

## 2025-01-14 ENCOUNTER — TELEPHONE (OUTPATIENT)
Dept: ONCOLOGY | Age: 55
End: 2025-01-14

## 2025-01-14 NOTE — TELEPHONE ENCOUNTER
Physician provider: Dr. Hilario  Reason for today's call (Please detail here patients chief complaint): following up on previous call for pt's prosthetics  Last office visit:n/a  Patient Callback Number: [phone] to reach Aracely with [facility]   Was callback number verified?: Yes  Preferred pharmacy (If refill request): n/a  Calls to office within the last 48 hours?:No    Patient notified that their information will be routed to the [facility] clinical triage team for review. Patient is advised that they will receive a phone call from the triage department. If symptom related and symptoms worsen before receiving a call back, the patient has been advised to proceed to the nearest ED.      Aracely with [facility] following up on 1/10/25 call about bilateral mastectomy prosthetics for pt.    [phone] to reach Aracely

## 2025-01-14 NOTE — TELEPHONE ENCOUNTER
Returned call.  Aracely oliva DME standard written order signed.  Instructed to fax to [phone].

## 2025-01-14 NOTE — PROGRESS NOTES
[facility] Hematology and Oncology: Established patient - follow up     Chief Complaint   Patient presents with    Follow-up     Referral Diagnosis: Neoplasm of right breast, primary tumor staging category Tis: lobular carcinoma in situ (LCIS)  Referring Provider: Roderick Juárez Jr., MD  Primary Care Provider: Berto Daigle DO  Family History of Cancer/ Hematology Disorders: None reported   Presenting Symptoms: Abnormal routine screening mammogram     History of Present Illness:  Ms. Luke is a 54 y.o. female who presents today for follow up regarding breast disease.  The past medical history is below.    - At consultation, we discussed the pathophysiology of breast cancer, staging, and the importance of receptor status in terms of treatment options.  We then reviewed her medical history as well as oncologic history, recent imaging and pathology in detail.  We discussed role of chemoprevention and risks associated with LCIS.    - follow-up.  Interim events noted.  At last appointment abnormality was palpated and patient was sent for imaging which confirmed need for bx.  Path c/w lobular invasive disease.  She is scheduled for mastectomy on 6/20.  Will proceed with CT CAP as a stat prior to surgery tomorrow.  She is in agreement with this plan and wishes to know baseline prior to proceeding with surgery.  No other issues or concerns at this time.  She has made peace with the decision of pursuing mastectomies.  Pathology reviewed in detail.  She was here with her daughter and .  We discussed the pathology and pathophysiology and staging of breast cancer.  We discussed the role of endocrine therapy as well as chemotherapy in the future.  Role of Oncotype DX discussed and it's predictive/prognostic value.  No other issues or concerns at this time.  7/2024 - here for follow-up after bilateral mastectomies.  She tolerated the surgery very well and is healing nicely.  Mild seroma noted over left chest wall

## 2025-01-21 DIAGNOSIS — Z80.3 FAMILY HISTORY OF MALIGNANT NEOPLASM OF BREAST: ICD-10-CM

## 2025-01-21 DIAGNOSIS — C50.411 MALIGNANT NEOPLASM OF UPPER-OUTER QUADRANT OF RIGHT BREAST IN FEMALE, ESTROGEN RECEPTOR POSITIVE (HCC): Primary | ICD-10-CM

## 2025-01-21 DIAGNOSIS — Z80.0 FAMILY HISTORY OF MALIGNANT NEOPLASM OF GASTROINTESTINAL TRACT: ICD-10-CM

## 2025-01-21 DIAGNOSIS — Z17.0 MALIGNANT NEOPLASM OF UPPER-OUTER QUADRANT OF RIGHT BREAST IN FEMALE, ESTROGEN RECEPTOR POSITIVE (HCC): Primary | ICD-10-CM

## 2025-01-21 NOTE — PROGRESS NOTES
MsHarjinder Luke and I spoke on the phone and she opted for testing targeted to breast cancer genes. We did discuss that testing of these genes could still end up producing information about risk of other types of cacncer also associated with that gene. Also discussed that if the lab contacts her via text that she should respond ASAP to avoid the lab defaulting to insurance billing. She indicated understanding.

## 2025-01-22 ENCOUNTER — OFFICE VISIT (OUTPATIENT)
Dept: ONCOLOGY | Age: 55
End: 2025-01-22
Payer: COMMERCIAL

## 2025-01-22 ENCOUNTER — HOSPITAL ENCOUNTER (OUTPATIENT)
Dept: LAB | Age: 55
Discharge: HOME | End: 2025-01-22
Payer: COMMERCIAL

## 2025-01-22 VITALS
TEMPERATURE: 98.4 F | HEIGHT: 61 IN | BODY MASS INDEX: 35.68 KG/M2 | DIASTOLIC BLOOD PRESSURE: 73 MMHG | OXYGEN SATURATION: 98 % | SYSTOLIC BLOOD PRESSURE: 159 MMHG | WEIGHT: 189 LBS | HEART RATE: 89 BPM | RESPIRATION RATE: 16 BRPM

## 2025-01-22 DIAGNOSIS — R53.83 FATIGUE, UNSPECIFIED TYPE: ICD-10-CM

## 2025-01-22 DIAGNOSIS — C50.411 MALIGNANT NEOPLASM OF UPPER-OUTER QUADRANT OF RIGHT BREAST IN FEMALE, ESTROGEN RECEPTOR POSITIVE (HCC): ICD-10-CM

## 2025-01-22 DIAGNOSIS — Z80.3 FAMILY HISTORY OF MALIGNANT NEOPLASM OF BREAST: ICD-10-CM

## 2025-01-22 DIAGNOSIS — Z17.0 MALIGNANT NEOPLASM OF UPPER-OUTER QUADRANT OF RIGHT BREAST IN FEMALE, ESTROGEN RECEPTOR POSITIVE (HCC): ICD-10-CM

## 2025-01-22 DIAGNOSIS — C50.411 MALIGNANT NEOPLASM OF UPPER-OUTER QUADRANT OF RIGHT BREAST IN FEMALE, ESTROGEN RECEPTOR POSITIVE (HCC): Primary | ICD-10-CM

## 2025-01-22 DIAGNOSIS — D58.2 ELEVATED HEMOGLOBIN (HCC): ICD-10-CM

## 2025-01-22 DIAGNOSIS — Z79.811 AROMATASE INHIBITOR USE: ICD-10-CM

## 2025-01-22 DIAGNOSIS — Z80.0 FAMILY HISTORY OF MALIGNANT NEOPLASM OF GASTROINTESTINAL TRACT: ICD-10-CM

## 2025-01-22 DIAGNOSIS — R79.89 ELEVATED LFTS: ICD-10-CM

## 2025-01-22 DIAGNOSIS — Z17.0 MALIGNANT NEOPLASM OF UPPER-OUTER QUADRANT OF RIGHT BREAST IN FEMALE, ESTROGEN RECEPTOR POSITIVE (HCC): Primary | ICD-10-CM

## 2025-01-22 DIAGNOSIS — Z91.89 AT RISK FOR BONE DENSITY LOSS: ICD-10-CM

## 2025-01-22 LAB
ALBUMIN SERPL-MCNC: 3.9 G/DL (ref 3.5–5)
ALBUMIN/GLOB SERPL: 1.1 (ref 1–1.9)
ALP SERPL-CCNC: 71 U/L (ref 35–104)
ALT SERPL-CCNC: 86 U/L (ref 8–45)
ANION GAP SERPL CALC-SCNC: 12 MMOL/L (ref 7–16)
AST SERPL-CCNC: 63 U/L (ref 15–37)
BASOPHILS # BLD: 0.05 K/UL (ref 0–0.2)
BASOPHILS NFR BLD: 0.8 % (ref 0–2)
BILIRUB SERPL-MCNC: 0.6 MG/DL (ref 0–1.2)
BUN SERPL-MCNC: 14 MG/DL (ref 6–23)
CALCIUM SERPL-MCNC: 9.3 MG/DL (ref 8.8–10.2)
CHLORIDE SERPL-SCNC: 104 MMOL/L (ref 98–107)
CO2 SERPL-SCNC: 26 MMOL/L (ref 20–29)
COLLECTION INFORMATION: NORMAL
CREAT SERPL-MCNC: 0.77 MG/DL (ref 0.6–1.1)
DATE SENT TO REF LAB: NORMAL
DIFFERENTIAL METHOD BLD: ABNORMAL
EOSINOPHIL # BLD: 0.15 K/UL (ref 0–0.8)
EOSINOPHIL NFR BLD: 2.4 % (ref 0.5–7.8)
ERYTHROCYTE [DISTWIDTH] IN BLOOD BY AUTOMATED COUNT: 12.9 % (ref 11.9–14.6)
FOLATE SERPL-MCNC: 8.1 NG/ML (ref 3.1–17.5)
GLOBULIN SER CALC-MCNC: 3.6 G/DL (ref 2.3–3.5)
GLUCOSE SERPL-MCNC: 111 MG/DL (ref 70–99)
HCT VFR BLD AUTO: 47.5 % (ref 35.8–46.3)
HGB BLD-MCNC: 15.9 G/DL (ref 11.7–15.4)
IMM GRANULOCYTES # BLD AUTO: 0.06 K/UL (ref 0–0.5)
IMM GRANULOCYTES NFR BLD AUTO: 1 % (ref 0–5)
LYMPHOCYTES # BLD: 1.01 K/UL (ref 0.5–4.6)
LYMPHOCYTES NFR BLD: 16.4 % (ref 13–44)
Lab: NORMAL
MCH RBC QN AUTO: 32.5 PG (ref 26.1–32.9)
MCHC RBC AUTO-ENTMCNC: 33.5 G/DL (ref 31.4–35)
MCV RBC AUTO: 97.1 FL (ref 82–102)
MONOCYTES # BLD: 0.53 K/UL (ref 0.1–1.3)
MONOCYTES NFR BLD: 8.6 % (ref 4–12)
NEUTS SEG # BLD: 4.36 K/UL (ref 1.7–8.2)
NEUTS SEG NFR BLD: 70.8 % (ref 43–78)
NRBC # BLD: 0 K/UL (ref 0–0.2)
PLATELET # BLD AUTO: 262 K/UL (ref 150–450)
PMV BLD AUTO: 10.8 FL (ref 9.4–12.3)
POTASSIUM SERPL-SCNC: 3.9 MMOL/L (ref 3.5–5.1)
PROT SERPL-MCNC: 7.5 G/DL (ref 6.3–8.2)
RBC # BLD AUTO: 4.89 M/UL (ref 4.05–5.2)
SODIUM SERPL-SCNC: 142 MMOL/L (ref 136–145)
TSH, 3RD GENERATION: 4.24 UIU/ML (ref 0.27–4.2)
VIT B12 SERPL-MCNC: 353 PG/ML (ref 193–986)
WBC # BLD AUTO: 6.2 K/UL (ref 4.3–11.1)

## 2025-01-22 PROCEDURE — 36415 COLL VENOUS BLD VENIPUNCTURE: CPT

## 2025-01-22 PROCEDURE — 82607 VITAMIN B-12: CPT

## 2025-01-22 PROCEDURE — 99215 OFFICE O/P EST HI 40 MIN: CPT | Performed by: INTERNAL MEDICINE

## 2025-01-22 PROCEDURE — 3077F SYST BP >= 140 MM HG: CPT | Performed by: INTERNAL MEDICINE

## 2025-01-22 PROCEDURE — 82746 ASSAY OF FOLIC ACID SERUM: CPT

## 2025-01-22 PROCEDURE — 85025 COMPLETE CBC W/AUTO DIFF WBC: CPT

## 2025-01-22 PROCEDURE — 80053 COMPREHEN METABOLIC PANEL: CPT

## 2025-01-22 PROCEDURE — 84443 ASSAY THYROID STIM HORMONE: CPT

## 2025-01-22 PROCEDURE — 3078F DIAST BP <80 MM HG: CPT | Performed by: INTERNAL MEDICINE

## 2025-01-22 RX ORDER — LETROZOLE 2.5 MG/1
2.5 TABLET, FILM COATED ORAL DAILY
Qty: 90 TABLET | Refills: 3 | Status: SHIPPED | OUTPATIENT
Start: 2025-01-22

## 2025-01-22 ASSESSMENT — PATIENT HEALTH QUESTIONNAIRE - PHQ9
SUM OF ALL RESPONSES TO PHQ QUESTIONS 1-9: 0
1. LITTLE INTEREST OR PLEASURE IN DOING THINGS: NOT AT ALL
SUM OF ALL RESPONSES TO PHQ QUESTIONS 1-9: 0
SUM OF ALL RESPONSES TO PHQ QUESTIONS 1-9: 0
SUM OF ALL RESPONSES TO PHQ9 QUESTIONS 1 & 2: 0
SUM OF ALL RESPONSES TO PHQ QUESTIONS 1-9: 0
2. FEELING DOWN, DEPRESSED OR HOPELESS: NOT AT ALL

## 2025-01-22 NOTE — PATIENT INSTRUCTIONS
1.30 K/UL Final    Eosinophils Absolute 01/22/2025 0.15  0.00 - 0.80 K/UL Final    Basophils Absolute 01/22/2025 0.05  0.00 - 0.20 K/UL Final    Immature Granulocytes Absolute 01/22/2025 0.06  0.00 - 0.50 K/UL Final    Differential Type 01/22/2025 AUTOMATED    Final    Sodium 01/22/2025 142  136 - 145 mmol/L Final    Potassium 01/22/2025 3.9  3.5 - 5.1 mmol/L Final    Chloride 01/22/2025 104  98 - 107 mmol/L Final    CO2 01/22/2025 26  20 - 29 mmol/L Final    Anion Gap 01/22/2025 12  7 - 16 mmol/L Final    Glucose 01/22/2025 111 (H)  70 - 99 mg/dL Final    Comment: <70 mg/dL Consistent with, but not fully diagnostic of hypoglycemia.  100 - 125 mg/dL Impaired fasting glucose/consistent with pre-diabetes mellitus.  > 126 mg/dl Fasting glucose consistent with overt diabetes mellitus      BUN 01/22/2025 14  6 - 23 MG/DL Final    Creatinine 01/22/2025 0.77  0.60 - 1.10 MG/DL Final    Est, Glom Filt Rate 01/22/2025 >90  >60 ml/min/1.73m2 Final    Comment:    Pediatric calculator link: https://www.kidney.org/professionals/kdoqi/gfr_calculatorped     These results are not intended for use in patients <18 years of age.     eGFR results are calculated without a race factor using  the 2021 CKD-EPI equation. Careful clinical correlation is recommended, particularly when comparing to results calculated using previous equations.  The CKD-EPI equation is less accurate in patients with extremes of muscle mass, extra-renal metabolism of creatinine, excessive creatine ingestion, or following therapy that affects renal tubular secretion.      Calcium 01/22/2025 9.3  8.8 - 10.2 MG/DL Final    Total Bilirubin 01/22/2025 0.6  0.0 - 1.2 MG/DL Final    ALT 01/22/2025 86 (H)  8 - 45 U/L Final    AST 01/22/2025 63 (H)  15 - 37 U/L Final    Alk Phosphatase 01/22/2025 71  35 - 104 U/L Final    Total Protein 01/22/2025 7.5  6.3 - 8.2 g/dL Final    Albumin 01/22/2025 3.9  3.5 - 5.0 g/dL Final    Globulin 01/22/2025 3.6 (H)  2.3 - 3.5 g/dL Final

## 2025-01-31 ENCOUNTER — OFFICE VISIT (OUTPATIENT)
Dept: FAMILY MEDICINE CLINIC | Facility: CLINIC | Age: 55
End: 2025-01-31
Payer: COMMERCIAL

## 2025-01-31 VITALS
BODY MASS INDEX: 34.74 KG/M2 | HEIGHT: 61 IN | OXYGEN SATURATION: 94 % | DIASTOLIC BLOOD PRESSURE: 86 MMHG | SYSTOLIC BLOOD PRESSURE: 126 MMHG | HEART RATE: 105 BPM | WEIGHT: 184 LBS

## 2025-01-31 DIAGNOSIS — J10.1 INFLUENZA A: Primary | ICD-10-CM

## 2025-01-31 PROCEDURE — 3079F DIAST BP 80-89 MM HG: CPT | Performed by: FAMILY MEDICINE

## 2025-01-31 PROCEDURE — 99213 OFFICE O/P EST LOW 20 MIN: CPT | Performed by: FAMILY MEDICINE

## 2025-01-31 PROCEDURE — 3074F SYST BP LT 130 MM HG: CPT | Performed by: FAMILY MEDICINE

## 2025-01-31 RX ORDER — BROMPHENIRAMINE MALEATE, PSEUDOEPHEDRINE HYDROCHLORIDE, AND DEXTROMETHORPHAN HYDROBROMIDE 2; 10; 30 MG/5ML; MG/5ML; MG/5ML
SYRUP ORAL
Qty: 160 ML | Refills: 1 | Status: SHIPPED | OUTPATIENT
Start: 2025-01-31

## 2025-01-31 RX ORDER — PROMETHAZINE HYDROCHLORIDE 25 MG/1
25 TABLET ORAL EVERY 6 HOURS PRN
Qty: 30 TABLET | Refills: 0 | Status: SHIPPED | OUTPATIENT
Start: 2025-01-31

## 2025-01-31 RX ORDER — GUAIFENESIN AND DEXTROMETHORPHAN HBR 1200; 60 MG/1; MG/1
TABLET, EXTENDED RELEASE ORAL
COMMUNITY
Start: 2024-12-08

## 2025-01-31 RX ORDER — OSELTAMIVIR PHOSPHATE 75 MG/1
75 CAPSULE ORAL 2 TIMES DAILY
Qty: 10 CAPSULE | Refills: 0 | Status: SHIPPED | OUTPATIENT
Start: 2025-01-31 | End: 2025-02-05

## 2025-01-31 SDOH — ECONOMIC STABILITY: INCOME INSECURITY: IN THE LAST 12 MONTHS, WAS THERE A TIME WHEN YOU WERE NOT ABLE TO PAY THE MORTGAGE OR RENT ON TIME?: NO

## 2025-01-31 SDOH — ECONOMIC STABILITY: FOOD INSECURITY: WITHIN THE PAST 12 MONTHS, YOU WORRIED THAT YOUR FOOD WOULD RUN OUT BEFORE YOU GOT MONEY TO BUY MORE.: NEVER TRUE

## 2025-01-31 SDOH — ECONOMIC STABILITY: FOOD INSECURITY: WITHIN THE PAST 12 MONTHS, THE FOOD YOU BOUGHT JUST DIDN'T LAST AND YOU DIDN'T HAVE MONEY TO GET MORE.: NEVER TRUE

## 2025-01-31 ASSESSMENT — ENCOUNTER SYMPTOMS
COUGH: 1
VOMITING: 0
NAUSEA: 1
SHORTNESS OF BREATH: 0
SORE THROAT: 1

## 2025-01-31 NOTE — PROGRESS NOTES
RBC 4.89 4.05 - 5.2 M/uL    Hemoglobin 15.9 (H) 11.7 - 15.4 g/dL    Hematocrit 47.5 (H) 35.8 - 46.3 %    MCV 97.1 82.0 - 102.0 FL    MCH 32.5 26.1 - 32.9 PG    MCHC 33.5 31.4 - 35.0 g/dL    RDW 12.9 11.9 - 14.6 %    Platelets 262 150 - 450 K/uL    MPV 10.8 9.4 - 12.3 FL    nRBC 0.00 0.0 - 0.2 K/uL    Neutrophils % 70.8 43.0 - 78.0 %    Lymphocytes % 16.4 13.0 - 44.0 %    Monocytes % 8.6 4.0 - 12.0 %    Eosinophils % 2.4 0.5 - 7.8 %    Basophils % 0.8 0.0 - 2.0 %    Immature Granulocytes % 1.0 0.0 - 5.0 %    Neutrophils Absolute 4.36 1.70 - 8.20 K/UL    Lymphocytes Absolute 1.01 0.50 - 4.60 K/UL    Monocytes Absolute 0.53 0.10 - 1.30 K/UL    Eosinophils Absolute 0.15 0.00 - 0.80 K/UL    Basophils Absolute 0.05 0.00 - 0.20 K/UL    Immature Granulocytes Absolute 0.06 0.00 - 0.50 K/UL    Differential Type AUTOMATED     Comprehensive Metabolic Panel    Collection Time: 01/22/25  2:04 PM   Result Value Ref Range    Sodium 142 136 - 145 mmol/L    Potassium 3.9 3.5 - 5.1 mmol/L    Chloride 104 98 - 107 mmol/L    CO2 26 20 - 29 mmol/L    Anion Gap 12 7 - 16 mmol/L    Glucose 111 (H) 70 - 99 mg/dL    BUN 14 6 - 23 MG/DL    Creatinine 0.77 0.60 - 1.10 MG/DL    Est, Glom Filt Rate >90 >60 ml/min/1.73m2    Calcium 9.3 8.8 - 10.2 MG/DL    Total Bilirubin 0.6 0.0 - 1.2 MG/DL    ALT 86 (H) 8 - 45 U/L    AST 63 (H) 15 - 37 U/L    Alk Phosphatase 71 35 - 104 U/L    Total Protein 7.5 6.3 - 8.2 g/dL    Albumin 3.9 3.5 - 5.0 g/dL    Globulin 3.6 (H) 2.3 - 3.5 g/dL    Albumin/Globulin Ratio 1.1 1.0 - 1.9     Miscellaneous [facility] SendOut    Collection Time: 01/22/25  2:04 PM   Result Value Ref Range    Test Description: AMBRY     Collection Information Collected for [facility]      Date sent to Ref Lab COLLECT FOR [facility]    TSH    Collection Time: 01/22/25  2:04 PM   Result Value Ref Range    TSH, 3rd Generation 4.240 (H) 0.270 - 4.200 uIU/mL   Vitamin B12    Collection Time: 01/22/25  2:04 PM   Result Value Ref Range    Vitamin B-12 353 454 - 687

## 2025-02-05 ENCOUNTER — CLINICAL DOCUMENTATION (OUTPATIENT)
Dept: ONCOLOGY | Age: 55
End: 2025-02-05

## 2025-02-05 DIAGNOSIS — R06.83 SNORING: Primary | ICD-10-CM

## 2025-02-05 NOTE — PROGRESS NOTES
Date: 2/5/2025    Patient: Allison Luke  YOB: 1970    Provider: Maryann New MS CGC     Allison, you were previously referred by Ines Hialrio MD  for an initial genetic consultation due to your personal and family history.  You were seen on January 1st, 2025 and consented to genetic testing, which was sent to [facility]. I called you on February 5th, 2025 to discuss the results of this testing. This letter is a summary of the results.     Results  During our initial meeting, we reviewed your personal and family history. Following discussion of your  history we discussed the benefits, limitations and possible impacts of genetic testing and you pursued a panel called [employer] looking at 13 genes in which pathogenic variants (harmful genetic differences) have been related to increased risk for breast cancer. The genes included in this panel were ROX, BARD1, BRCA1, BRCA2, CDH1, CHEK2, NF1, PALB2, PTEN, RAD51C, RAD51D, STK11, TP53.      This testing came back negative, meaning we did not identify a pathogenic variant that increases risk of developing breast cancer.     During our initial meeting we reviewed the potential reasons for a negative test result, and these include:  There is not a pathogenic variant in your family that increases risk of developing cancer, and all the cases of cancer in your family have been sporadic, or by chance.  There is a pathogenic variant in your family that increases the risk of developing cancer, but you did not inherit this variant.   There is a pathogenic variant in your family that increases the risk of developing cancer but it is located in a gene that was not tested, or an untestable area of a gene.    Considering these various reasons for a negative result, we suggest you check back in with us should there be any updates to your personal or family cancer history.    Screening Recommendations  As discussed in our initial

## 2025-02-07 ENCOUNTER — HOSPITAL ENCOUNTER (OUTPATIENT)
Dept: LAB | Age: 55
Discharge: HOME | End: 2025-02-07
Payer: COMMERCIAL

## 2025-02-07 DIAGNOSIS — R79.89 ELEVATED LFTS: ICD-10-CM

## 2025-02-07 LAB
ALBUMIN SERPL-MCNC: 4 G/DL (ref 3.5–5)
ALBUMIN/GLOB SERPL: 1.4 (ref 1–1.9)
ALP SERPL-CCNC: 71 U/L (ref 35–104)
ALT SERPL-CCNC: 80 U/L (ref 8–45)
AST SERPL-CCNC: 64 U/L (ref 15–37)
BILIRUB DIRECT SERPL-MCNC: <0.2 MG/DL (ref 0–0.4)
BILIRUB SERPL-MCNC: 0.6 MG/DL (ref 0–1.2)
GLOBULIN SER CALC-MCNC: 2.8 G/DL (ref 2.3–3.5)
PROT SERPL-MCNC: 6.9 G/DL (ref 6.3–8.2)

## 2025-02-07 PROCEDURE — 80076 HEPATIC FUNCTION PANEL: CPT

## 2025-02-07 PROCEDURE — 36415 COLL VENOUS BLD VENIPUNCTURE: CPT

## 2025-02-10 DIAGNOSIS — R79.89 ELEVATED LFTS: Primary | ICD-10-CM

## 2025-02-17 ENCOUNTER — HOSPITAL ENCOUNTER (OUTPATIENT)
Dept: MAMMOGRAPHY | Age: 55
Discharge: HOME | End: 2025-02-20
Attending: INTERNAL MEDICINE
Payer: COMMERCIAL

## 2025-02-17 DIAGNOSIS — Z79.811 AROMATASE INHIBITOR USE: ICD-10-CM

## 2025-02-17 DIAGNOSIS — Z91.89 AT RISK FOR BONE DENSITY LOSS: ICD-10-CM

## 2025-02-17 PROCEDURE — 77080 DXA BONE DENSITY AXIAL: CPT

## 2025-02-18 ENCOUNTER — PATIENT MESSAGE (OUTPATIENT)
Dept: ONCOLOGY | Age: 55
End: 2025-02-18

## 2025-02-18 DIAGNOSIS — R16.0 HEPATOMEGALY: ICD-10-CM

## 2025-02-18 DIAGNOSIS — K76.0 HEPATIC STEATOSIS: Primary | ICD-10-CM

## 2025-02-19 DIAGNOSIS — R79.89 ELEVATED LFTS: Primary | ICD-10-CM

## 2025-03-06 ENCOUNTER — HOSPITAL ENCOUNTER (OUTPATIENT)
Dept: LAB | Age: 55
Discharge: HOME | End: 2025-03-06
Payer: COMMERCIAL

## 2025-03-06 DIAGNOSIS — R79.89 ELEVATED LFTS: ICD-10-CM

## 2025-03-06 LAB
ALBUMIN SERPL-MCNC: 3.8 G/DL (ref 3.5–5)
ALBUMIN/GLOB SERPL: 1.1 (ref 1–1.9)
ALP SERPL-CCNC: 71 U/L (ref 35–104)
ALT SERPL-CCNC: 80 U/L (ref 8–45)
AST SERPL-CCNC: 53 U/L (ref 15–37)
BILIRUB DIRECT SERPL-MCNC: <0.2 MG/DL (ref 0–0.4)
BILIRUB SERPL-MCNC: 0.6 MG/DL (ref 0–1.2)
GLOBULIN SER CALC-MCNC: 3.4 G/DL (ref 2.3–3.5)
PROT SERPL-MCNC: 7.2 G/DL (ref 6.3–8.2)

## 2025-03-06 PROCEDURE — 80076 HEPATIC FUNCTION PANEL: CPT

## 2025-03-06 PROCEDURE — 36415 COLL VENOUS BLD VENIPUNCTURE: CPT

## 2025-03-07 ENCOUNTER — TELEPHONE (OUTPATIENT)
Dept: ONCOLOGY | Age: 55
End: 2025-03-07

## 2025-03-07 DIAGNOSIS — R79.89 ELEVATED LFTS: Primary | ICD-10-CM

## 2025-03-10 ENCOUNTER — TELEPHONE (OUTPATIENT)
Dept: ONCOLOGY | Age: 55
End: 2025-03-10

## 2025-03-10 NOTE — TELEPHONE ENCOUNTER
Physician provider: Dr. Hilario  Reason for today's call (Please detail here patients chief complaint): biopsy denial  Last office visit:NA  Patient Callback Number: [phone]  Was callback number verified?: Yes  Preferred pharmacy (If refill request): NA  Calls to office within the last 48 hours?:No    Patient notified that their information will be routed to the [facility] clinical triage team for review. Patient is advised that they will receive a phone call from the triage department. If symptom related and symptoms worsen before receiving a call back, the patient has been advised to proceed to the nearest ED.      Patient stated her insurance company denied her biopsy because it was filed as routine.  [phone]   Patient would like a call back

## 2025-03-13 ENCOUNTER — OFFICE VISIT (OUTPATIENT)
Dept: FAMILY MEDICINE CLINIC | Facility: CLINIC | Age: 55
End: 2025-03-13
Payer: COMMERCIAL

## 2025-03-13 ENCOUNTER — HOSPITAL ENCOUNTER (OUTPATIENT)
Dept: SLEEP MEDICINE | Age: 55
Discharge: HOME | End: 2025-03-16
Payer: COMMERCIAL

## 2025-03-13 VITALS — HEART RATE: 86 BPM | WEIGHT: 185 LBS | BODY MASS INDEX: 34.93 KG/M2 | HEIGHT: 61 IN

## 2025-03-13 DIAGNOSIS — C50.911 PRIMARY MALIGNANT NEOPLASM OF RIGHT BREAST WITH METASTASIS TO MOVABLE IPSILATERAL LEVEL 1 OR 2 AXILLARY LYMPH NODES (N1) (HCC): ICD-10-CM

## 2025-03-13 DIAGNOSIS — R79.89 LOW TSH LEVEL: ICD-10-CM

## 2025-03-13 DIAGNOSIS — F51.04 CHRONIC INSOMNIA: Primary | ICD-10-CM

## 2025-03-13 DIAGNOSIS — Z00.00 LABORATORY EXAMINATION ORDERED AS PART OF A ROUTINE GENERAL MEDICAL EXAMINATION: Primary | ICD-10-CM

## 2025-03-13 DIAGNOSIS — E55.9 VITAMIN D DEFICIENCY: ICD-10-CM

## 2025-03-13 DIAGNOSIS — I10 PRIMARY HYPERTENSION: ICD-10-CM

## 2025-03-13 DIAGNOSIS — C77.3 PRIMARY MALIGNANT NEOPLASM OF RIGHT BREAST WITH METASTASIS TO MOVABLE IPSILATERAL LEVEL 1 OR 2 AXILLARY LYMPH NODES (N1) (HCC): ICD-10-CM

## 2025-03-13 PROCEDURE — 99213 OFFICE O/P EST LOW 20 MIN: CPT | Performed by: FAMILY MEDICINE

## 2025-03-13 PROCEDURE — 95811 POLYSOM 6/>YRS CPAP 4/> PARM: CPT

## 2025-03-13 RX ORDER — ZOLPIDEM TARTRATE 10 MG/1
10 TABLET ORAL NIGHTLY PRN
Qty: 30 TABLET | Refills: 0 | Status: SHIPPED | OUTPATIENT
Start: 2025-03-13 | End: 2025-04-12

## 2025-03-13 ASSESSMENT — ENCOUNTER SYMPTOMS: SHORTNESS OF BREATH: 0

## 2025-03-13 NOTE — PROGRESS NOTES
PROGRESS NOTE    SUBJECTIVE:   Allison Luke is a 54 y.o. female seen for a follow up visit regarding the following chief complaint:     Chief Complaint   Patient presents with    Insomnia                  HPI patient complaining of insomnia presently on trazodone we have opted before not working patient states he is going through menopause also has a sleep study scheduled for today      Past Medical History, Past Surgical History, Family history, Social History, and Medications were all reviewed with the patient today and updated as necessary.       Current Outpatient Medications   Medication Sig Dispense Refill    zolpidem (AMBIEN) 10 MG tablet Take 1 tablet by mouth nightly as needed for Sleep for up to 30 days. Max Daily Amount: 10 mg 30 tablet 0    traZODone (DESYREL) 50 MG tablet Take 1 tablet by mouth nightly      pregabalin (LYRICA) 75 MG capsule Take 1 capsule by mouth daily.      Misc. Devices MISC 1 each by Does not apply route once as needed (As many mastectomy bras and breast prosthetics as insurance allows.) 1 each 0    mirabegron (MYRBETRIQ) 50 MG TB24 TAKE 50 MG BY MOUTH DAILY 90 tablet 3    valsartan (DIOVAN) 160 MG tablet Take 1 tablet by mouth daily 90 tablet 3    albuterol sulfate HFA (PROVENTIL;VENTOLIN;PROAIR) 108 (90 Base) MCG/ACT inhaler TAKE 2 PUFFS BY MOUTH EVERY 4 HOURS 18 g 5    loratadine (CLARITIN) 10 MG capsule Take 1 capsule by mouth daily      promethazine (PHENERGAN) 25 MG tablet Take 1 tablet by mouth every 6 hours as needed for Nausea 30 tablet 0     Current Facility-Administered Medications   Medication Dose Route Frequency Provider Last Rate Last Admin    diatrizoate meglumine-sodium (GASTROGRAFIN) 66-10 % solution 15 mL  15 mL Oral ONCE PRN Ines Hilario MD   15 mL at 06/18/24 0847     Allergies   Allergen Reactions    Latex     Codeine Other (See Comments)     Makes her \"wired\" hyper    Penicillins Rash    Morphine Other (See Comments)     Grandmother had brain stem

## 2025-04-07 NOTE — PROGRESS NOTES
[facility] Hematology and Oncology: Established patient - follow up     Chief Complaint   Patient presents with    Follow-up     Referral Diagnosis: Neoplasm of right breast, primary tumor staging category Tis: lobular carcinoma in situ (LCIS)  Referring Provider: Roderick Juárez Jr., MD  Primary Care Provider: Berto Daigle DO  Family History of Cancer/ Hematology Disorders: None reported   Presenting Symptoms: Abnormal routine screening mammogram     History of Present Illness:  Ms. Luke is a 54 y.o. female who presents today for follow up regarding breast disease.  The past medical history is below.    - At consultation, we discussed the pathophysiology of breast cancer, staging, and the importance of receptor status in terms of treatment options.  We then reviewed her medical history as well as oncologic history, recent imaging and pathology in detail.  We discussed role of chemoprevention and risks associated with LCIS.    - follow-up.  Interim events noted.  At last appointment abnormality was palpated and patient was sent for imaging which confirmed need for bx.  Path c/w lobular invasive disease.  She is scheduled for mastectomy on 6/20.  Will proceed with CT CAP as a stat prior to surgery tomorrow.  She is in agreement with this plan and wishes to know baseline prior to proceeding with surgery.  No other issues or concerns at this time.  She has made peace with the decision of pursuing mastectomies.  Pathology reviewed in detail.  She was here with her daughter and .  We discussed the pathology and pathophysiology and staging of breast cancer.  We discussed the role of endocrine therapy as well as chemotherapy in the future.  Role of Oncotype DX discussed and it's predictive/prognostic value.  No other issues or concerns at this time.  7/2024 - here for follow-up after bilateral mastectomies.  She tolerated the surgery very well and is healing nicely.  Mild seroma noted over left chest wall

## 2025-04-18 ENCOUNTER — HOSPITAL ENCOUNTER (OUTPATIENT)
Dept: LAB | Age: 55
Discharge: HOME | End: 2025-04-18
Payer: COMMERCIAL

## 2025-04-18 DIAGNOSIS — R79.89 ELEVATED LFTS: ICD-10-CM

## 2025-04-18 LAB
ALBUMIN SERPL-MCNC: 3.8 G/DL (ref 3.5–5)
ALBUMIN/GLOB SERPL: 1.1 (ref 1–1.9)
ALP SERPL-CCNC: 89 U/L (ref 35–104)
ALT SERPL-CCNC: 87 U/L (ref 8–45)
AST SERPL-CCNC: 67 U/L (ref 15–37)
BILIRUB DIRECT SERPL-MCNC: 0.3 MG/DL (ref 0–0.3)
BILIRUB SERPL-MCNC: 0.8 MG/DL (ref 0–1.2)
GLOBULIN SER CALC-MCNC: 3.5 G/DL (ref 2.3–3.5)
PROT SERPL-MCNC: 7.3 G/DL (ref 6.3–8.2)

## 2025-04-18 PROCEDURE — 36415 COLL VENOUS BLD VENIPUNCTURE: CPT

## 2025-04-18 PROCEDURE — 80076 HEPATIC FUNCTION PANEL: CPT

## 2025-04-21 ENCOUNTER — TELEPHONE (OUTPATIENT)
Dept: ONCOLOGY | Age: 55
End: 2025-04-21

## 2025-04-21 ENCOUNTER — OFFICE VISIT (OUTPATIENT)
Dept: ONCOLOGY | Age: 55
End: 2025-04-21
Payer: COMMERCIAL

## 2025-04-21 VITALS
HEART RATE: 96 BPM | OXYGEN SATURATION: 95 % | SYSTOLIC BLOOD PRESSURE: 153 MMHG | TEMPERATURE: 98.3 F | HEIGHT: 61 IN | RESPIRATION RATE: 16 BRPM | BODY MASS INDEX: 34.55 KG/M2 | DIASTOLIC BLOOD PRESSURE: 78 MMHG | WEIGHT: 183 LBS

## 2025-04-21 DIAGNOSIS — Z17.0 MALIGNANT NEOPLASM OF UPPER-OUTER QUADRANT OF RIGHT BREAST IN FEMALE, ESTROGEN RECEPTOR POSITIVE (HCC): Primary | ICD-10-CM

## 2025-04-21 DIAGNOSIS — M85.80 OSTEOPENIA, UNSPECIFIED LOCATION: ICD-10-CM

## 2025-04-21 DIAGNOSIS — R76.8 ANA POSITIVE: ICD-10-CM

## 2025-04-21 DIAGNOSIS — C50.411 MALIGNANT NEOPLASM OF UPPER-OUTER QUADRANT OF RIGHT BREAST IN FEMALE, ESTROGEN RECEPTOR POSITIVE (HCC): Primary | ICD-10-CM

## 2025-04-21 DIAGNOSIS — E55.9 VITAMIN D DEFICIENCY: ICD-10-CM

## 2025-04-21 DIAGNOSIS — K76.0 FATTY LIVER: ICD-10-CM

## 2025-04-21 PROCEDURE — 3078F DIAST BP <80 MM HG: CPT | Performed by: INTERNAL MEDICINE

## 2025-04-21 PROCEDURE — 3077F SYST BP >= 140 MM HG: CPT | Performed by: INTERNAL MEDICINE

## 2025-04-21 PROCEDURE — 99215 OFFICE O/P EST HI 40 MIN: CPT | Performed by: INTERNAL MEDICINE

## 2025-04-21 RX ORDER — NYSTATIN 100000 U/G
CREAM TOPICAL
Qty: 30 G | Refills: 1 | Status: SHIPPED | OUTPATIENT
Start: 2025-04-21

## 2025-04-21 RX ORDER — ZOLPIDEM TARTRATE 10 MG/1
TABLET ORAL
COMMUNITY
Start: 2025-04-08

## 2025-04-21 ASSESSMENT — PATIENT HEALTH QUESTIONNAIRE - PHQ9
SUM OF ALL RESPONSES TO PHQ QUESTIONS 1-9: 0
SUM OF ALL RESPONSES TO PHQ QUESTIONS 1-9: 0
1. LITTLE INTEREST OR PLEASURE IN DOING THINGS: NOT AT ALL
SUM OF ALL RESPONSES TO PHQ QUESTIONS 1-9: 0
SUM OF ALL RESPONSES TO PHQ QUESTIONS 1-9: 0
2. FEELING DOWN, DEPRESSED OR HOPELESS: NOT AT ALL

## 2025-04-21 NOTE — PATIENT INSTRUCTIONS
Patient Information from Today's Visit    The members of your [facility] are listed below:    Physician Provider: Ines Hilario, Medical Oncologist  Registered Nurse: Gerda OATES RN  Navigator: Tasha CHIN RN or Carol RAMIREZ RN  Medical Assistant: Jenise ANTONIO MA  : Rani ALANIS   Supportive Care Services: Suhail LEOS LMSW    Diagnosis: Breast      Follow Up Instructions: 3 months.    Labs reviewed.  Symptoms reviewed.  Referral to rheumatology.  Recommend at least 2000 IU of Vitamin D daily.    Treatment Summary has been discussed and given to patient:N/A      Current Labs:   Hospital Outpatient Visit on 04/18/2025   Component Date Value Ref Range Status    Total Protein 04/18/2025 7.3  6.3 - 8.2 g/dL Final    Albumin 04/18/2025 3.8  3.5 - 5.0 g/dL Final    Globulin 04/18/2025 3.5  2.3 - 3.5 g/dL Final    Albumin/Globulin Ratio 04/18/2025 1.1  1.0 - 1.9   Final    Total Bilirubin 04/18/2025 0.8  0.0 - 1.2 MG/DL Final    Bilirubin, Direct 04/18/2025 0.3  0.0 - 0.3 MG/DL Final    Alk Phosphatase 04/18/2025 89  35 - 104 U/L Final    AST 04/18/2025 67 (H)  15 - 37 U/L Final    ALT 04/18/2025 87 (H)  8 - 45 U/L Final                 Please refer to After Visit Summary or [employer] for upcoming appointment information. Please call our office for rescheduling needs at least 24 hours before your scheduled appointment time.If you have any questions regarding your upcoming schedule please reach out to your care team through [employer] or call [phone].     Please notify your assigned Nurse Navigator of any unplanned hospital admissions or Emergency Room visits within 24 hours of discharge.    -------------------------------------------------------------------------------------------------------------------  Please call our office at [phone] if you have any  of the following symptoms:   Fever of 100.5 or greater  Chills  Shortness of breath  Swelling or pain in one leg    After office hours an answering

## 2025-04-21 NOTE — TELEPHONE ENCOUNTER
Call to pt after visit per MD to let her know that after review with GI, she can start letrozole.  Pt will be brought back in for lab check/OV about a month after starting letrozole to review labs.  Also let pt know that if her rheumatology appt isn't scheduled for months that we can refer her elsewhere.  She VU and will let us know.  She appreciated call back.

## 2025-04-22 PROBLEM — M85.80 OSTEOPENIA: Status: ACTIVE | Noted: 2025-04-22

## 2025-04-22 PROBLEM — K76.0 FATTY LIVER: Status: ACTIVE | Noted: 2025-04-22

## 2025-04-22 PROBLEM — Z17.0 MALIGNANT NEOPLASM OF UPPER-OUTER QUADRANT OF RIGHT BREAST IN FEMALE, ESTROGEN RECEPTOR POSITIVE (HCC): Status: ACTIVE | Noted: 2025-04-22

## 2025-04-22 PROBLEM — E55.9 VITAMIN D DEFICIENCY: Status: ACTIVE | Noted: 2025-04-22

## 2025-04-22 PROBLEM — R76.8 ANA POSITIVE: Status: ACTIVE | Noted: 2025-04-22

## 2025-04-22 PROBLEM — C50.411 MALIGNANT NEOPLASM OF UPPER-OUTER QUADRANT OF RIGHT BREAST IN FEMALE, ESTROGEN RECEPTOR POSITIVE (HCC): Status: ACTIVE | Noted: 2025-04-22

## 2025-04-22 RX ORDER — LETROZOLE 2.5 MG/1
2.5 TABLET, FILM COATED ORAL DAILY
Qty: 90 TABLET | Refills: 3 | Status: SHIPPED | OUTPATIENT
Start: 2025-04-22

## 2025-04-23 ENCOUNTER — TELEPHONE (OUTPATIENT)
Dept: SLEEP MEDICINE | Age: 55
End: 2025-04-23

## 2025-04-24 DIAGNOSIS — R76.8 ANA POSITIVE: Primary | ICD-10-CM

## 2025-05-06 ENCOUNTER — OFFICE VISIT (OUTPATIENT)
Dept: ORTHOPEDIC SURGERY | Age: 55
End: 2025-05-06
Payer: COMMERCIAL

## 2025-05-06 DIAGNOSIS — M17.12 ARTHRITIS OF LEFT KNEE: Primary | ICD-10-CM

## 2025-05-06 PROCEDURE — 20610 DRAIN/INJ JOINT/BURSA W/O US: CPT | Performed by: PHYSICIAN ASSISTANT

## 2025-05-06 RX ORDER — TRIAMCINOLONE ACETONIDE 40 MG/ML
40 INJECTION, SUSPENSION INTRA-ARTICULAR; INTRAMUSCULAR ONCE
Status: COMPLETED | OUTPATIENT
Start: 2025-05-06 | End: 2025-05-06

## 2025-05-06 RX ADMIN — TRIAMCINOLONE ACETONIDE 40 MG: 40 INJECTION, SUSPENSION INTRA-ARTICULAR; INTRAMUSCULAR at 15:36

## 2025-05-06 NOTE — PROGRESS NOTES
Name: Allison Luke  YOB: 1970  Gender: female  MRN: [id number]    CC: Knee Pain (L)     HPI: Allison Luke is a 54 y.o. female who returns for follow up on her eft knee pain. She is here today for a repeat cortisone injection.     Physical Examination:  General: no acute distress  Lungs: breathing easily  CV: regular rhythm by pulse  Left Knee: No change in previous exam.  No previous surgical scars noted.  No joint effusion present.  Patella tracks centrally along patellofemoral grind.  Noticeable pain with alignment noted.  Range of motion 0 to 120 degrees.  Ligaments is stable x 4.  Calf is soft and nontender to palpation.  Dorsi and plantarflexion mechanism intact.  Dorsalis pedis pulse 2 +    Assessment:     ICD-10-CM    1. Arthritis of left knee  M17.12 triamcinolone acetonide (KENALOG-40) injection 40 mg     DRAIN/INJECT LARGE JOINT/BURSA        Plan:     Before beginning this procedure, the procedure was explained in detail. The patient's name and  were confirmed. The body part and laterality were identified and agreed upon by the patient and myself.  The patient elected to proceed with an intraarticular knee injection today.  After verbal informed consent was obtained after sterile prep the left knee  was injected from a superior lateral approach with 4 cc of 0.25% Marcaine and 1 cc of 40 mg Kenalog.  The patient tolerated the procedure well was given postinjection flare precautions.    Carlie Ivy PA-C   Orthopaedics and Sports Medicine

## 2025-05-14 DIAGNOSIS — Z12.39 BREAST CANCER SCREENING, HIGH RISK PATIENT: ICD-10-CM

## 2025-05-14 DIAGNOSIS — R53.83 FATIGUE, UNSPECIFIED TYPE: ICD-10-CM

## 2025-05-14 DIAGNOSIS — Z71.89 GOALS OF CARE, COUNSELING/DISCUSSION: ICD-10-CM

## 2025-05-14 DIAGNOSIS — Z80.0 FAMILY HISTORY OF MALIGNANT NEOPLASM OF GASTROINTESTINAL TRACT: ICD-10-CM

## 2025-05-14 DIAGNOSIS — D05.01 LOBULAR CARCINOMA IN SITU (LCIS) OF RIGHT BREAST: ICD-10-CM

## 2025-05-14 DIAGNOSIS — R79.89 ELEVATED LFTS: ICD-10-CM

## 2025-05-14 DIAGNOSIS — N64.59 ABNORMAL BREAST EXAM: ICD-10-CM

## 2025-05-14 DIAGNOSIS — R76.8 ANA POSITIVE: ICD-10-CM

## 2025-05-14 DIAGNOSIS — M85.80 OSTEOPENIA, UNSPECIFIED LOCATION: ICD-10-CM

## 2025-05-14 DIAGNOSIS — Z13.29 SCREENING FOR HYPOTHYROIDISM: ICD-10-CM

## 2025-05-14 DIAGNOSIS — Z79.811 AROMATASE INHIBITOR USE: ICD-10-CM

## 2025-05-14 DIAGNOSIS — I89.0 LYMPHEDEMA: ICD-10-CM

## 2025-05-14 DIAGNOSIS — K76.0 FATTY LIVER: ICD-10-CM

## 2025-05-14 DIAGNOSIS — F40.240 CLAUSTROPHOBIA: ICD-10-CM

## 2025-05-14 DIAGNOSIS — D58.2 ELEVATED HEMOGLOBIN: ICD-10-CM

## 2025-05-14 DIAGNOSIS — E55.9 VITAMIN D DEFICIENCY: ICD-10-CM

## 2025-05-14 DIAGNOSIS — C50.912 LOBULAR CARCINOMA OF LEFT BREAST (HCC): ICD-10-CM

## 2025-05-14 DIAGNOSIS — Z12.31 ENCOUNTER FOR SCREENING MAMMOGRAM FOR MALIGNANT NEOPLASM OF BREAST: ICD-10-CM

## 2025-05-14 DIAGNOSIS — C50.411 MALIGNANT NEOPLASM OF UPPER-OUTER QUADRANT OF RIGHT BREAST IN FEMALE, ESTROGEN RECEPTOR POSITIVE (HCC): Primary | ICD-10-CM

## 2025-05-14 DIAGNOSIS — Z90.13 HISTORY OF BILATERAL MASTECTOMY: ICD-10-CM

## 2025-05-14 DIAGNOSIS — R92.30 DENSE BREAST TISSUE: ICD-10-CM

## 2025-05-14 DIAGNOSIS — Z91.89 AT RISK FOR BONE DENSITY LOSS: ICD-10-CM

## 2025-05-14 DIAGNOSIS — Z17.0 MALIGNANT NEOPLASM OF UPPER-OUTER QUADRANT OF RIGHT BREAST IN FEMALE, ESTROGEN RECEPTOR POSITIVE (HCC): Primary | ICD-10-CM

## 2025-05-14 DIAGNOSIS — Z80.3 FAMILY HISTORY OF MALIGNANT NEOPLASM OF BREAST: ICD-10-CM

## 2025-05-19 ENCOUNTER — HOSPITAL ENCOUNTER (OUTPATIENT)
Dept: LAB | Age: 55
Discharge: HOME | End: 2025-05-19
Payer: COMMERCIAL

## 2025-05-19 DIAGNOSIS — R92.30 DENSE BREAST TISSUE: ICD-10-CM

## 2025-05-19 DIAGNOSIS — F40.240 CLAUSTROPHOBIA: ICD-10-CM

## 2025-05-19 DIAGNOSIS — Z12.39 BREAST CANCER SCREENING, HIGH RISK PATIENT: ICD-10-CM

## 2025-05-19 DIAGNOSIS — R76.8 ANA POSITIVE: ICD-10-CM

## 2025-05-19 DIAGNOSIS — Z80.0 FAMILY HISTORY OF MALIGNANT NEOPLASM OF GASTROINTESTINAL TRACT: ICD-10-CM

## 2025-05-19 DIAGNOSIS — Z12.31 ENCOUNTER FOR SCREENING MAMMOGRAM FOR MALIGNANT NEOPLASM OF BREAST: ICD-10-CM

## 2025-05-19 DIAGNOSIS — Z13.29 SCREENING FOR HYPOTHYROIDISM: ICD-10-CM

## 2025-05-19 DIAGNOSIS — R79.89 LOW TSH LEVEL: ICD-10-CM

## 2025-05-19 DIAGNOSIS — Z00.00 LABORATORY EXAMINATION ORDERED AS PART OF A ROUTINE GENERAL MEDICAL EXAMINATION: ICD-10-CM

## 2025-05-19 DIAGNOSIS — E55.9 VITAMIN D DEFICIENCY: ICD-10-CM

## 2025-05-19 DIAGNOSIS — D58.2 ELEVATED HEMOGLOBIN: ICD-10-CM

## 2025-05-19 DIAGNOSIS — R79.89 ELEVATED LFTS: ICD-10-CM

## 2025-05-19 DIAGNOSIS — D05.01 LOBULAR CARCINOMA IN SITU (LCIS) OF RIGHT BREAST: ICD-10-CM

## 2025-05-19 DIAGNOSIS — Z90.13 HISTORY OF BILATERAL MASTECTOMY: ICD-10-CM

## 2025-05-19 DIAGNOSIS — Z80.3 FAMILY HISTORY OF MALIGNANT NEOPLASM OF BREAST: ICD-10-CM

## 2025-05-19 DIAGNOSIS — K76.0 FATTY LIVER: ICD-10-CM

## 2025-05-19 DIAGNOSIS — Z71.89 GOALS OF CARE, COUNSELING/DISCUSSION: ICD-10-CM

## 2025-05-19 DIAGNOSIS — Z91.89 AT RISK FOR BONE DENSITY LOSS: ICD-10-CM

## 2025-05-19 DIAGNOSIS — C50.912 LOBULAR CARCINOMA OF LEFT BREAST (HCC): ICD-10-CM

## 2025-05-19 DIAGNOSIS — I89.0 LYMPHEDEMA: ICD-10-CM

## 2025-05-19 DIAGNOSIS — Z17.0 MALIGNANT NEOPLASM OF UPPER-OUTER QUADRANT OF RIGHT BREAST IN FEMALE, ESTROGEN RECEPTOR POSITIVE (HCC): ICD-10-CM

## 2025-05-19 DIAGNOSIS — C50.411 MALIGNANT NEOPLASM OF UPPER-OUTER QUADRANT OF RIGHT BREAST IN FEMALE, ESTROGEN RECEPTOR POSITIVE (HCC): ICD-10-CM

## 2025-05-19 DIAGNOSIS — Z79.811 AROMATASE INHIBITOR USE: ICD-10-CM

## 2025-05-19 DIAGNOSIS — I10 PRIMARY HYPERTENSION: ICD-10-CM

## 2025-05-19 DIAGNOSIS — R53.83 FATIGUE, UNSPECIFIED TYPE: ICD-10-CM

## 2025-05-19 DIAGNOSIS — M85.80 OSTEOPENIA, UNSPECIFIED LOCATION: ICD-10-CM

## 2025-05-19 DIAGNOSIS — N64.59 ABNORMAL BREAST EXAM: ICD-10-CM

## 2025-05-19 LAB
25(OH)D3 SERPL-MCNC: 34.8 NG/ML (ref 30–100)
25(OH)D3 SERPL-MCNC: 35.2 NG/ML (ref 30–100)
ALBUMIN SERPL-MCNC: 3.7 G/DL (ref 3.5–5)
ALBUMIN SERPL-MCNC: 3.9 G/DL (ref 3.5–5)
ALBUMIN/GLOB SERPL: 1.1 (ref 1–1.9)
ALBUMIN/GLOB SERPL: 1.1 (ref 1–1.9)
ALP SERPL-CCNC: 94 U/L (ref 35–104)
ALP SERPL-CCNC: 96 U/L (ref 35–104)
ALT SERPL-CCNC: 71 U/L (ref 8–45)
ALT SERPL-CCNC: 79 U/L (ref 8–45)
ANION GAP SERPL CALC-SCNC: 11 MMOL/L (ref 7–16)
ANION GAP SERPL CALC-SCNC: 12 MMOL/L (ref 7–16)
AST SERPL-CCNC: 53 U/L (ref 15–37)
AST SERPL-CCNC: 54 U/L (ref 15–37)
BASOPHILS # BLD: 0.04 K/UL (ref 0–0.2)
BASOPHILS NFR BLD: 0.9 % (ref 0–2)
BILIRUB SERPL-MCNC: 0.8 MG/DL (ref 0–1.2)
BILIRUB SERPL-MCNC: 0.9 MG/DL (ref 0–1.2)
BUN SERPL-MCNC: 15 MG/DL (ref 6–23)
BUN SERPL-MCNC: 15 MG/DL (ref 6–23)
CALCIUM SERPL-MCNC: 9.6 MG/DL (ref 8.8–10.2)
CALCIUM SERPL-MCNC: 9.9 MG/DL (ref 8.8–10.2)
CHLORIDE SERPL-SCNC: 103 MMOL/L (ref 98–107)
CHLORIDE SERPL-SCNC: 104 MMOL/L (ref 98–107)
CHOLEST SERPL-MCNC: 168 MG/DL (ref 0–200)
CO2 SERPL-SCNC: 25 MMOL/L (ref 20–29)
CO2 SERPL-SCNC: 27 MMOL/L (ref 20–29)
CREAT SERPL-MCNC: 0.82 MG/DL (ref 0.6–1.1)
CREAT SERPL-MCNC: 0.88 MG/DL (ref 0.6–1.1)
DIFFERENTIAL METHOD BLD: NORMAL
EOSINOPHIL # BLD: 0.27 K/UL (ref 0–0.8)
EOSINOPHIL NFR BLD: 5.7 % (ref 0.5–7.8)
ERYTHROCYTE [DISTWIDTH] IN BLOOD BY AUTOMATED COUNT: 13.8 % (ref 11.9–14.6)
GLOBULIN SER CALC-MCNC: 3.4 G/DL (ref 2.3–3.5)
GLOBULIN SER CALC-MCNC: 3.7 G/DL (ref 2.3–3.5)
GLUCOSE SERPL-MCNC: 101 MG/DL (ref 70–99)
GLUCOSE SERPL-MCNC: 98 MG/DL (ref 70–99)
HCT VFR BLD AUTO: 45.8 % (ref 35.8–46.3)
HDLC SERPL-MCNC: 63 MG/DL (ref 40–60)
HDLC SERPL: 2.6 (ref 0–5)
HGB BLD-MCNC: 15.2 G/DL (ref 11.7–15.4)
IMM GRANULOCYTES # BLD AUTO: 0.03 K/UL (ref 0–0.5)
IMM GRANULOCYTES NFR BLD AUTO: 0.6 % (ref 0–5)
LDLC SERPL CALC-MCNC: 91 MG/DL (ref 0–100)
LYMPHOCYTES # BLD: 1.02 K/UL (ref 0.5–4.6)
LYMPHOCYTES NFR BLD: 21.7 % (ref 13–44)
MCH RBC QN AUTO: 32.1 PG (ref 26.1–32.9)
MCHC RBC AUTO-ENTMCNC: 33.2 G/DL (ref 31.4–35)
MCV RBC AUTO: 96.6 FL (ref 82–102)
MONOCYTES # BLD: 0.44 K/UL (ref 0.1–1.3)
MONOCYTES NFR BLD: 9.4 % (ref 4–12)
NEUTS SEG # BLD: 2.9 K/UL (ref 1.7–8.2)
NEUTS SEG NFR BLD: 61.7 % (ref 43–78)
NRBC # BLD: 0 K/UL (ref 0–0.2)
PLATELET # BLD AUTO: 241 K/UL (ref 150–450)
PMV BLD AUTO: 10.9 FL (ref 9.4–12.3)
POTASSIUM SERPL-SCNC: 3.7 MMOL/L (ref 3.5–5.1)
POTASSIUM SERPL-SCNC: 4 MMOL/L (ref 3.5–5.1)
PROT SERPL-MCNC: 7.2 G/DL (ref 6.3–8.2)
PROT SERPL-MCNC: 7.6 G/DL (ref 6.3–8.2)
RBC # BLD AUTO: 4.74 M/UL (ref 4.05–5.2)
SODIUM SERPL-SCNC: 140 MMOL/L (ref 136–145)
SODIUM SERPL-SCNC: 142 MMOL/L (ref 136–145)
TRIGL SERPL-MCNC: 69 MG/DL (ref 0–150)
TSH, 3RD GENERATION: 6.59 UIU/ML (ref 0.27–4.2)
VLDLC SERPL CALC-MCNC: 14 MG/DL (ref 6–23)
WBC # BLD AUTO: 4.7 K/UL (ref 4.3–11.1)

## 2025-05-19 PROCEDURE — 36415 COLL VENOUS BLD VENIPUNCTURE: CPT

## 2025-05-19 PROCEDURE — 80053 COMPREHEN METABOLIC PANEL: CPT

## 2025-05-19 PROCEDURE — 85025 COMPLETE CBC W/AUTO DIFF WBC: CPT

## 2025-05-19 PROCEDURE — 82306 VITAMIN D 25 HYDROXY: CPT

## 2025-05-19 ASSESSMENT — SLEEP AND FATIGUE QUESTIONNAIRES
HOW LIKELY ARE YOU TO NOD OFF OR FALL ASLEEP WHILE SITTING QUIETLY AFTER LUNCH WITHOUT ALCOHOL: WOULD NEVER DOZE
HOW LIKELY ARE YOU TO NOD OFF OR FALL ASLEEP WHEN YOU ARE A PASSENGER IN A CAR FOR AN HOUR WITHOUT A BREAK: SLIGHT CHANCE OF DOZING
HOW LIKELY ARE YOU TO NOD OFF OR FALL ASLEEP WHEN YOU ARE A PASSENGER IN A CAR FOR AN HOUR WITHOUT A BREAK: SLIGHT CHANCE OF DOZING
HOW LIKELY ARE YOU TO NOD OFF OR FALL ASLEEP WHILE WATCHING TV: HIGH CHANCE OF DOZING
HOW LIKELY ARE YOU TO NOD OFF OR FALL ASLEEP WHILE SITTING AND TALKING TO SOMEONE: WOULD NEVER DOZE
HOW LIKELY ARE YOU TO NOD OFF OR FALL ASLEEP WHILE SITTING INACTIVE IN A PUBLIC PLACE: SLIGHT CHANCE OF DOZING
HOW LIKELY ARE YOU TO NOD OFF OR FALL ASLEEP WHILE SITTING AND TALKING TO SOMEONE: WOULD NEVER DOZE
HOW LIKELY ARE YOU TO NOD OFF OR FALL ASLEEP WHILE SITTING INACTIVE IN A PUBLIC PLACE: SLIGHT CHANCE OF DOZING
HOW LIKELY ARE YOU TO NOD OFF OR FALL ASLEEP WHILE SITTING QUIETLY AFTER LUNCH WITHOUT ALCOHOL: WOULD NEVER DOZE
HOW LIKELY ARE YOU TO NOD OFF OR FALL ASLEEP WHILE SITTING AND READING: MODERATE CHANCE OF DOZING
HOW LIKELY ARE YOU TO NOD OFF OR FALL ASLEEP WHILE SITTING AND READING: MODERATE CHANCE OF DOZING
ESS TOTAL SCORE: 10
HOW LIKELY ARE YOU TO NOD OFF OR FALL ASLEEP IN A CAR, WHILE STOPPED FOR A FEW MINUTES IN TRAFFIC: WOULD NEVER DOZE
HOW LIKELY ARE YOU TO NOD OFF OR FALL ASLEEP WHILE LYING DOWN TO REST IN THE AFTERNOON WHEN CIRCUMSTANCES PERMIT: HIGH CHANCE OF DOZING
HOW LIKELY ARE YOU TO NOD OFF OR FALL ASLEEP IN A CAR, WHILE STOPPED FOR A FEW MINUTES IN TRAFFIC: WOULD NEVER DOZE
HOW LIKELY ARE YOU TO NOD OFF OR FALL ASLEEP WHILE WATCHING TV: HIGH CHANCE OF DOZING
HOW LIKELY ARE YOU TO NOD OFF OR FALL ASLEEP WHILE LYING DOWN TO REST IN THE AFTERNOON WHEN CIRCUMSTANCES PERMIT: HIGH CHANCE OF DOZING

## 2025-05-19 NOTE — PROGRESS NOTES
trazodone and Ambien as needed as prescribed by PCP.  Follow-up in 4 months or sooner if needed.      Orders Placed This Encounter   Procedures    DME - DURABLE MEDICAL EQUIPMENT     Mask per patient choice    [facility]  Phone: [address] [place] [address]  Dept: [phone]      Patient Name: Allison Luke  : 1970  Gender: female  Address: [address] [place] [facility] [address]  Patient phone number: There are no phone numbers on file.      Primary Insurance: Payor: [facility] / Plan: [facility] STATE / Product Type: *No Product type* /   Subscriber ID: [id number] - ([facility])      AMB Supply Order  Order Details     DME Location: first choice    Order Date: 2025   The primary encounter diagnosis was KELSEA (obstructive sleep apnea). Diagnoses of Nocturnal hypoxemia, Psychophysiological insomnia, Non-restorative sleep, and Obesity (BMI 30-39.9) were also pertinent to this visit.          (  X   )New Set-Up     AutoCPAP machine   (     ) CPAP Unit  (  x   ) Auto CPAP Unit  (     ) BiLevel Unit  (     ) Auto BiLevel Unit  (     ) ASV   (     ) Bilevel ST    (     ) Oxygen Concentrator         Length of need: 12 months    Pressure: 9  cmH20  EPR:      Starting Ramp Pressure:   cm H20  Ramp Time: min      Patient had a diagnostic Apnea Hypopnea Index (AHI) of :  17.5    *SUPPLIES* Replace all as needed, or per coverage guidelines     Masks Type:    (    ) -Full Face Mask (1 per 3 mon)  (     ) -Full Mask (1 per month) Interface/Cushion      (   x  ) -Nasal Mask (1 per 3 mon)  (   x  ) - Nasal Mask (1 per month) Interface/Cushion  (     ) -Pillow (2 per mon)  (     ) -Bxgpkyrqk (1 per 6 mon)      _________________________________________________________________          Other Supplies:    (  X   )-Dgkokghu (1 per 6 mon)  ( X    )-Sltmxz Tubing (1 per 3 mon)  (  X   )- Disposable

## 2025-05-20 ENCOUNTER — TELEMEDICINE (OUTPATIENT)
Dept: SLEEP MEDICINE | Age: 55
End: 2025-05-20
Payer: COMMERCIAL

## 2025-05-20 DIAGNOSIS — E66.9 OBESITY (BMI 30-39.9): ICD-10-CM

## 2025-05-20 DIAGNOSIS — G47.33 OSA (OBSTRUCTIVE SLEEP APNEA): Primary | ICD-10-CM

## 2025-05-20 DIAGNOSIS — F51.04 PSYCHOPHYSIOLOGICAL INSOMNIA: ICD-10-CM

## 2025-05-20 DIAGNOSIS — G47.34 NOCTURNAL HYPOXEMIA: ICD-10-CM

## 2025-05-20 DIAGNOSIS — G47.8 NON-RESTORATIVE SLEEP: ICD-10-CM

## 2025-05-20 PROCEDURE — 99214 OFFICE O/P EST MOD 30 MIN: CPT | Performed by: STUDENT IN AN ORGANIZED HEALTH CARE EDUCATION/TRAINING PROGRAM

## 2025-05-20 ASSESSMENT — ENCOUNTER SYMPTOMS
ABDOMINAL PAIN: 0
DIARRHEA: 0
CHEST TIGHTNESS: 0
VOMITING: 0
SORE THROAT: 0
SCLERAL ICTERUS: 0
NAUSEA: 0
TROUBLE SWALLOWING: 0
HEMOPTYSIS: 0
SHORTNESS OF BREATH: 0
VOICE CHANGE: 0
BLOOD IN STOOL: 0
ABDOMINAL DISTENTION: 0
WHEEZING: 0

## 2025-05-20 NOTE — PROGRESS NOTES
this in the past when she was diagnosed w ADH in 2018.  At that time, she elected to forego chemoprevention.  Today, we again reviewed role of chemoprevention w Cobb or AI.  Risk of recurrence/malignancy can be reduced by chemoprevention.  We reviewed the caveats, MOA and SE of these agents in detail including endometrial hyperplasia w small risk of uterine ca and VTE w Cobb, and risk of arthralgias and accelerated osteoporosis w AIs.  Printed info given to pt for her reference.  She could also consider lower dose of Cobb.  She certainly wishes to c/w mammogram/MR surveillance but has not made a decision re endo therapy.  She will call and let us know if she'd like for Rx to be sent.    - here for follow-up.  Interim events noted and we had a lengthy discussion.  At last appointment abnormality was palpated and patient was sent for imaging which confirmed need for bx.  She underwnt mammogram/MRI and additional views mammogram per details above.  Path c/w lobular invasive disease.  She is scheduled for mastectomy on 6/21.  Will proceed with CT CAP stat prior to surgery.  She is in agreement with this plan and wishes to know baseline prior to proceeding with surgery.  No other issues or concerns at this time.  She has made peace with the decision of pursuing mastectomies.  Pathology reviewed in detail.  She was here with her daughter and .  We discussed the pathology and pathophysiology and staging of breast cancer.  We discussed the role of endocrine therapy as well as chemotherapy in the future.  Role of Oncotype DX discussed and it's predictive/prognostic value.  No other issues or concerns at this time.  - pt would like to be referred to genetics.    ADDENDUM: CT CAP - No evidence of metastatic disease in the chest, abdomen or pelvis per radiology  -  she is here for follow-up after bilateral mastectomies.  She tolerated the surgery very well and is healing nicely.  Mild seroma noted over left chest wall -

## 2025-05-20 NOTE — PATIENT INSTRUCTIONS
Patient Education        Sleep Apnea: Care Instructions  Overview     Sleep apnea means that you frequently stop breathing for 10 seconds or longer during sleep. It can be mild to severe, based on the number of times an hour that you stop breathing.  Blocked or narrowed airways in your nose, mouth, or throat can cause sleep apnea. Your airway can become blocked when your throat muscles and tongue relax during sleep.  You can help treat sleep apnea at home by making lifestyle changes. You also can use a CPAP breathing machine that keeps tissues in the throat from blocking your airway. You may use a mouth or nose device while you sleep to help keep your airway open. Surgery may be an option for some people. Surgery may be done to implant a nerve stimulation device in the chest that helps keep the airway open. Surgery can also be done to remove enlarged tissues that are blocking the throat.  Follow-up care is a key part of your treatment and safety. Be sure to make and go to all appointments, and call your doctor if you are having problems. It's also a good idea to know your test results and keep a list of the medicines you take.  How can you care for yourself at home?  Lose weight, if needed.  Sleep on your side. It may help mild apnea.  Avoid alcohol and medicines such as sleeping pills, opioids, or sedatives before bed.  Don't smoke. If you need help quitting, talk to your doctor.  Prop up the head of your bed.  Treat breathing problems, such as a stuffy nose, that are caused by a cold or allergies.  Try a continuous positive airway pressure (CPAP) breathing machine if your doctor recommends it.  If CPAP doesn't work for you, ask your doctor if you can try other masks, settings, or breathing machines.  Try oral breathing devices or other nasal devices.  Talk to your doctor if your nose feels dry or bleeds, or if it gets runny or stuffy when you use a breathing machine.  Tell your doctor if you're sleepy during the

## 2025-05-21 ENCOUNTER — OFFICE VISIT (OUTPATIENT)
Dept: ONCOLOGY | Age: 55
End: 2025-05-21
Payer: COMMERCIAL

## 2025-05-21 VITALS
DIASTOLIC BLOOD PRESSURE: 75 MMHG | BODY MASS INDEX: 34.67 KG/M2 | WEIGHT: 183.5 LBS | TEMPERATURE: 97.7 F | SYSTOLIC BLOOD PRESSURE: 145 MMHG | HEART RATE: 93 BPM | OXYGEN SATURATION: 96 %

## 2025-05-21 DIAGNOSIS — R79.89 ELEVATED LFTS: ICD-10-CM

## 2025-05-21 DIAGNOSIS — R53.83 FATIGUE, UNSPECIFIED TYPE: ICD-10-CM

## 2025-05-21 DIAGNOSIS — C50.411 MALIGNANT NEOPLASM OF UPPER-OUTER QUADRANT OF RIGHT BREAST IN FEMALE, ESTROGEN RECEPTOR POSITIVE (HCC): Primary | ICD-10-CM

## 2025-05-21 DIAGNOSIS — Z17.0 MALIGNANT NEOPLASM OF UPPER-OUTER QUADRANT OF RIGHT BREAST IN FEMALE, ESTROGEN RECEPTOR POSITIVE (HCC): Primary | ICD-10-CM

## 2025-05-21 DIAGNOSIS — K76.0 FATTY LIVER: ICD-10-CM

## 2025-05-21 PROCEDURE — 3078F DIAST BP <80 MM HG: CPT | Performed by: NURSE PRACTITIONER

## 2025-05-21 PROCEDURE — 3077F SYST BP >= 140 MM HG: CPT | Performed by: NURSE PRACTITIONER

## 2025-05-21 PROCEDURE — 99214 OFFICE O/P EST MOD 30 MIN: CPT | Performed by: NURSE PRACTITIONER

## 2025-05-21 ASSESSMENT — ENCOUNTER SYMPTOMS: CONSTIPATION: 1

## 2025-06-03 ENCOUNTER — OFFICE VISIT (OUTPATIENT)
Dept: FAMILY MEDICINE CLINIC | Facility: CLINIC | Age: 55
End: 2025-06-03
Payer: COMMERCIAL

## 2025-06-03 VITALS
WEIGHT: 186 LBS | HEART RATE: 96 BPM | BODY MASS INDEX: 35.12 KG/M2 | SYSTOLIC BLOOD PRESSURE: 132 MMHG | DIASTOLIC BLOOD PRESSURE: 80 MMHG | HEIGHT: 61 IN

## 2025-06-03 DIAGNOSIS — R30.0 DYSURIA: Primary | ICD-10-CM

## 2025-06-03 DIAGNOSIS — J45.20 MILD INTERMITTENT ASTHMA, UNSPECIFIED WHETHER COMPLICATED: ICD-10-CM

## 2025-06-03 DIAGNOSIS — N39.41 URGE INCONTINENCE: ICD-10-CM

## 2025-06-03 DIAGNOSIS — K76.0 FATTY LIVER: ICD-10-CM

## 2025-06-03 DIAGNOSIS — F51.02 ADJUSTMENT INSOMNIA: ICD-10-CM

## 2025-06-03 DIAGNOSIS — R79.89 ELEVATED TSH: ICD-10-CM

## 2025-06-03 DIAGNOSIS — G47.33 OSA (OBSTRUCTIVE SLEEP APNEA): ICD-10-CM

## 2025-06-03 DIAGNOSIS — I10 PRIMARY HYPERTENSION: ICD-10-CM

## 2025-06-03 DIAGNOSIS — E66.811 OBESITY (BMI 30.0-34.9): ICD-10-CM

## 2025-06-03 DIAGNOSIS — Z00.00 ROUTINE GENERAL MEDICAL EXAMINATION AT A HEALTH CARE FACILITY: ICD-10-CM

## 2025-06-03 DIAGNOSIS — Z13.31 SCREENING FOR DEPRESSION: ICD-10-CM

## 2025-06-03 LAB
BILIRUBIN, URINE, POC: NEGATIVE
BLOOD URINE, POC: NEGATIVE
GLUCOSE URINE, POC: NEGATIVE
KETONES, URINE, POC: NEGATIVE
LEUKOCYTE ESTERASE, URINE, POC: NEGATIVE
NITRITE, URINE, POC: NEGATIVE
PH, URINE, POC: 6.5 (ref 4.6–8)
PROTEIN,URINE, POC: NEGATIVE
SPECIFIC GRAVITY, URINE, POC: 1.03 (ref 1–1.03)
T3 SERPL-MCNC: 0.84 NG/ML (ref 0.6–1.81)
T4 SERPL-MCNC: 7.9 UG/DL (ref 4.5–11.7)
TSH, 3RD GENERATION: 5.92 UIU/ML (ref 0.27–4.2)
URINALYSIS CLARITY, POC: CLEAR
URINALYSIS COLOR, POC: YELLOW
UROBILINOGEN, POC: NORMAL

## 2025-06-03 PROCEDURE — 81003 URINALYSIS AUTO W/O SCOPE: CPT | Performed by: FAMILY MEDICINE

## 2025-06-03 PROCEDURE — 99396 PREV VISIT EST AGE 40-64: CPT | Performed by: FAMILY MEDICINE

## 2025-06-03 PROCEDURE — 3079F DIAST BP 80-89 MM HG: CPT | Performed by: FAMILY MEDICINE

## 2025-06-03 PROCEDURE — 3075F SYST BP GE 130 - 139MM HG: CPT | Performed by: FAMILY MEDICINE

## 2025-06-03 PROCEDURE — 99213 OFFICE O/P EST LOW 20 MIN: CPT | Performed by: FAMILY MEDICINE

## 2025-06-03 RX ORDER — VALSARTAN 160 MG/1
160 TABLET ORAL DAILY
Qty: 90 TABLET | Refills: 3 | Status: SHIPPED | OUTPATIENT
Start: 2025-06-03

## 2025-06-03 RX ORDER — ALBUTEROL SULFATE 90 UG/1
INHALANT RESPIRATORY (INHALATION)
Qty: 3 EACH | Refills: 3 | Status: SHIPPED | OUTPATIENT
Start: 2025-06-03

## 2025-06-03 RX ORDER — MIRABEGRON 50 MG/1
50 TABLET, FILM COATED, EXTENDED RELEASE ORAL DAILY
Qty: 90 TABLET | Refills: 3 | Status: SHIPPED | OUTPATIENT
Start: 2025-06-03

## 2025-06-03 RX ORDER — ZOLPIDEM TARTRATE 12.5 MG/1
12.5 TABLET, FILM COATED, EXTENDED RELEASE ORAL NIGHTLY PRN
Qty: 30 TABLET | Refills: 0 | Status: SHIPPED | OUTPATIENT
Start: 2025-06-03 | End: 2025-07-03

## 2025-06-03 ASSESSMENT — ENCOUNTER SYMPTOMS
NAUSEA: 0
VOMITING: 0
SHORTNESS OF BREATH: 0

## 2025-06-03 NOTE — PROGRESS NOTES
PROGRESS NOTE    SUBJECTIVE:   Allison Luke is a 54 y.o. female seen for a follow up visit regarding the following chief complaint:     Chief Complaint   Patient presents with    Annual Exam     Medication refills and pharmacy confirmed.           HPI patient presents the office today for complete physical complaining of inability to sleep presently on trazodone wears a CPAP machine and states she wakes up consistently every day around 2 to 3:00 in the morning and cannot go back to sleep      Past Medical History, Past Surgical History, Family history, Social History, and Medications were all reviewed with the patient today and updated as necessary.       Current Outpatient Medications   Medication Sig Dispense Refill    albuterol sulfate HFA (PROVENTIL;VENTOLIN;PROAIR) 108 (90 Base) MCG/ACT inhaler TAKE 2 PUFFS BY MOUTH EVERY 4 HOURS 3 each 3    mirabegron (MYRBETRIQ) 50 MG TB24 Take 50 mg by mouth daily 90 tablet 3    valsartan (DIOVAN) 160 MG tablet Take 1 tablet by mouth daily 90 tablet 3    zolpidem (AMBIEN CR) 12.5 MG extended release tablet Take 1 tablet by mouth nightly as needed for Sleep for up to 30 days. Max Daily Amount: 12.5 mg 30 tablet 0    letrozole (FEMARA) 2.5 MG tablet Take 1 tablet by mouth daily 90 tablet 3    nystatin (MYCOSTATIN) 658261 UNIT/GM cream Apply topically 2 times daily. 30 g 1    traZODone (DESYREL) 50 MG tablet Take 1 tablet by mouth nightly      pregabalin (LYRICA) 75 MG capsule Take 1 capsule by mouth daily.      Misc. Devices MISC 1 each by Does not apply route once as needed (As many mastectomy bras and breast prosthetics as insurance allows.) 1 each 0    loratadine (CLARITIN) 10 MG capsule Take 1 capsule by mouth daily      zolpidem (AMBIEN) 10 MG tablet TAKE 1 TABLET BY MOUTH NIGHTLY AS NEEDED FOR SLEEP FOR UP TO 30 DAYS MAX DAILY:10MG *INS PAYS FOR 15 (Patient not taking: Reported on 6/3/2025)       Current Facility-Administered Medications   Medication Dose Route

## 2025-06-05 ENCOUNTER — TELEMEDICINE (OUTPATIENT)
Dept: FAMILY MEDICINE CLINIC | Facility: CLINIC | Age: 55
End: 2025-06-05
Payer: COMMERCIAL

## 2025-06-05 DIAGNOSIS — R79.89 ELEVATED TSH: ICD-10-CM

## 2025-06-05 DIAGNOSIS — E03.8 SUBCLINICAL HYPOTHYROIDISM: Primary | ICD-10-CM

## 2025-06-05 PROCEDURE — 99214 OFFICE O/P EST MOD 30 MIN: CPT | Performed by: FAMILY MEDICINE

## 2025-06-05 ASSESSMENT — ENCOUNTER SYMPTOMS
VOMITING: 0
SHORTNESS OF BREATH: 0
NAUSEA: 0

## 2025-06-05 NOTE — PROGRESS NOTES
valsartan (DIOVAN) 160 MG tablet Take 1 tablet by mouth daily 90 tablet 3    letrozole (FEMARA) 2.5 MG tablet Take 1 tablet by mouth daily 90 tablet 3    traZODone (DESYREL) 50 MG tablet Take 1 tablet by mouth nightly      pregabalin (LYRICA) 75 MG capsule Take 1 capsule by mouth daily.      loratadine (CLARITIN) 10 MG capsule Take 1 capsule by mouth daily      zolpidem (AMBIEN CR) 12.5 MG extended release tablet Take 1 tablet by mouth nightly as needed for Sleep for up to 30 days. Max Daily Amount: 12.5 mg 30 tablet 0    Misc. Devices MISC 1 each by Does not apply route once as needed (As many mastectomy bras and breast prosthetics as insurance allows.) 1 each 0     Current Facility-Administered Medications   Medication Dose Route Frequency Provider Last Rate Last Admin    diatrizoate meglumine-sodium (GASTROGRAFIN) 66-10 % solution 15 mL  15 mL Oral ONCE PRN Ines Hilario MD   15 mL at 06/18/24 0847     Allergies   Allergen Reactions    Latex     Codeine Other (See Comments)     Makes her \"wired\" hyper    Penicillins Rash    Morphine Other (See Comments)     Grandmother had brain stem stroke with this    Adhesive Tape Rash     PAPER TAPE IS OK     Patient Active Problem List   Diagnosis    Mild depression    KELSEA (obstructive sleep apnea)    Psychophysiological insomnia    Obesity (BMI 30.0-34.9)    Nocturnal hypoxemia    Reactive depression    Hypertension    Acute medial meniscus tear, left, initial encounter    Left knee pain    Major depressive disorder, single episode, in full remission    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

## 2025-06-25 RX ORDER — TRAZODONE HYDROCHLORIDE 50 MG/1
50 TABLET ORAL NIGHTLY
Qty: 30 TABLET | Refills: 5 | Status: SHIPPED | OUTPATIENT
Start: 2025-06-25 | End: 2025-07-25

## 2025-06-30 RX ORDER — TRAZODONE HYDROCHLORIDE 100 MG/1
150 TABLET ORAL NIGHTLY
Qty: 135 TABLET | Refills: 3 | Status: SHIPPED | OUTPATIENT
Start: 2025-06-30 | End: 2026-06-25

## 2025-07-14 ENCOUNTER — HOSPITAL ENCOUNTER (OUTPATIENT)
Dept: GENERAL RADIOLOGY | Age: 55
Discharge: HOME | End: 2025-07-16
Payer: COMMERCIAL

## 2025-07-14 DIAGNOSIS — M79.672 LEFT FOOT PAIN: ICD-10-CM

## 2025-07-14 PROCEDURE — 73630 X-RAY EXAM OF FOOT: CPT

## 2025-07-27 ASSESSMENT — SLEEP AND FATIGUE QUESTIONNAIRES
HOW LIKELY ARE YOU TO NOD OFF OR FALL ASLEEP WHEN YOU ARE A PASSENGER IN A CAR FOR AN HOUR WITHOUT A BREAK: SLIGHT CHANCE OF DOZING
HOW LIKELY ARE YOU TO NOD OFF OR FALL ASLEEP WHILE SITTING INACTIVE IN A PUBLIC PLACE: WOULD NEVER DOZE
HOW LIKELY ARE YOU TO NOD OFF OR FALL ASLEEP WHILE SITTING QUIETLY AFTER LUNCH WITHOUT ALCOHOL: SLIGHT CHANCE OF DOZING
HOW LIKELY ARE YOU TO NOD OFF OR FALL ASLEEP WHILE SITTING INACTIVE IN A PUBLIC PLACE: WOULD NEVER DOZE
HOW LIKELY ARE YOU TO NOD OFF OR FALL ASLEEP IN A CAR, WHILE STOPPED FOR A FEW MINUTES IN TRAFFIC: WOULD NEVER DOZE
HOW LIKELY ARE YOU TO NOD OFF OR FALL ASLEEP IN A CAR, WHILE STOPPED FOR A FEW MINUTES IN TRAFFIC: WOULD NEVER DOZE
HOW LIKELY ARE YOU TO NOD OFF OR FALL ASLEEP WHEN YOU ARE A PASSENGER IN A CAR FOR AN HOUR WITHOUT A BREAK: SLIGHT CHANCE OF DOZING
HOW LIKELY ARE YOU TO NOD OFF OR FALL ASLEEP WHILE SITTING AND READING: MODERATE CHANCE OF DOZING
ESS TOTAL SCORE: 9
HOW LIKELY ARE YOU TO NOD OFF OR FALL ASLEEP WHILE LYING DOWN TO REST IN THE AFTERNOON WHEN CIRCUMSTANCES PERMIT: HIGH CHANCE OF DOZING
HOW LIKELY ARE YOU TO NOD OFF OR FALL ASLEEP WHILE SITTING AND TALKING TO SOMEONE: WOULD NEVER DOZE
HOW LIKELY ARE YOU TO NOD OFF OR FALL ASLEEP WHILE WATCHING TV: MODERATE CHANCE OF DOZING
HOW LIKELY ARE YOU TO NOD OFF OR FALL ASLEEP WHILE SITTING AND READING: MODERATE CHANCE OF DOZING
HOW LIKELY ARE YOU TO NOD OFF OR FALL ASLEEP WHILE LYING DOWN TO REST IN THE AFTERNOON WHEN CIRCUMSTANCES PERMIT: HIGH CHANCE OF DOZING
HOW LIKELY ARE YOU TO NOD OFF OR FALL ASLEEP WHILE SITTING QUIETLY AFTER LUNCH WITHOUT ALCOHOL: SLIGHT CHANCE OF DOZING
HOW LIKELY ARE YOU TO NOD OFF OR FALL ASLEEP WHILE WATCHING TV: MODERATE CHANCE OF DOZING
HOW LIKELY ARE YOU TO NOD OFF OR FALL ASLEEP WHILE SITTING AND TALKING TO SOMEONE: WOULD NEVER DOZE

## 2025-07-27 NOTE — PROGRESS NOTES
[facility]  [address]  [place] [address]  [phone]    Patient Name:  Allison Luke  YOB: 1970      Office Visit 7/28/2025    CHIEF COMPLAINT:    Chief Complaint   Patient presents with    Sleep Apnea    Follow-up         HISTORY OF PRESENT ILLNESS:    History of Present Illness   Patient is an 55 y.o. female seen today for follow up of KELSEA. Pt had a PSG/HST on 3/13/25 with an AHI of 17.5/hr with desaturations to 81%. Pt is on CPAP 9 cm H2O.       She was diagnosed with breast cancer in 2024 and underwent a hysterectomy in December 2024 due to persistently high hormone levels. In June 2025, she was diagnosed with nonalcoholic steatohepatitis (DEAL) and ulcerative colitis, and precancerous cells were detected in her colon. She is currently experiencing menopause, which has disrupted her sleep pattern. Her doctor suggested Ambien to regulate her sleep cycle, but after a 30-day trial, she found it ineffective and discontinued its use. She plans to consult her oncologist about potential hormone therapy. She estimates her current sleep duration to be between 4 and 4.5 hours, although she feels well-rested after 5 hours of sleep.    She uses a CPAP machine for an average of 5 hours and 52 minutes per night, which she puts on before going to bed. She does not struggle with falling asleep but often wakes up between 3:00 and 4:00 AM and remains awake. She was prescribed Ambien 10 mg, but her insurance only covers a 15-day supply at a time. She has not yet tried controlled-release melatonin. She uses a full-face mask during her sleep study but finds it uncomfortable and tight, causing her to wake up. She has noticed wetness on her face over the past week and has experienced ear pain for the past two days. She recalls swimming at the beach a few weeks ago. She has woken up twice with a red face from her mask and wonders if it has an alarm. She finds that sleeping

## 2025-07-28 ENCOUNTER — OFFICE VISIT (OUTPATIENT)
Dept: SLEEP MEDICINE | Age: 55
End: 2025-07-28
Payer: COMMERCIAL

## 2025-07-28 VITALS
WEIGHT: 184.5 LBS | TEMPERATURE: 98 F | SYSTOLIC BLOOD PRESSURE: 126 MMHG | RESPIRATION RATE: 18 BRPM | HEIGHT: 61 IN | HEART RATE: 96 BPM | OXYGEN SATURATION: 96 % | DIASTOLIC BLOOD PRESSURE: 74 MMHG | BODY MASS INDEX: 34.83 KG/M2

## 2025-07-28 DIAGNOSIS — G47.34 NOCTURNAL HYPOXEMIA: ICD-10-CM

## 2025-07-28 DIAGNOSIS — G47.33 OSA (OBSTRUCTIVE SLEEP APNEA): Primary | ICD-10-CM

## 2025-07-28 DIAGNOSIS — F51.04 PSYCHOPHYSIOLOGICAL INSOMNIA: ICD-10-CM

## 2025-07-28 PROCEDURE — 3078F DIAST BP <80 MM HG: CPT | Performed by: PHYSICIAN ASSISTANT

## 2025-07-28 PROCEDURE — 3074F SYST BP LT 130 MM HG: CPT | Performed by: PHYSICIAN ASSISTANT

## 2025-07-28 PROCEDURE — 99214 OFFICE O/P EST MOD 30 MIN: CPT | Performed by: PHYSICIAN ASSISTANT

## 2025-07-28 RX ORDER — RESMETIROM 80 MG/1
TABLET, COATED ORAL
COMMUNITY
Start: 2025-07-21

## 2025-07-28 RX ORDER — MESALAMINE 1.2 G/1
2.4 TABLET, DELAYED RELEASE ORAL EVERY MORNING
COMMUNITY
Start: 2025-06-11

## 2025-07-28 NOTE — PATIENT INSTRUCTIONS
I would like you to try extended release or time release melatonin. I would like you to start with 5mg tablets. You should take the melatonin 60-90 mins before going to bed. This should help you fall asleep but also help you stay asleep without making you groggy. If 5mg is not enough, you could take 2 tablets to make it 10mg but I would not recommend going higher on your dose. If it is not working, please call or send me a [email or web address] message.

## 2025-08-11 ENCOUNTER — OFFICE VISIT (OUTPATIENT)
Dept: ORTHOPEDIC SURGERY | Age: 55
End: 2025-08-11
Payer: COMMERCIAL

## 2025-08-11 DIAGNOSIS — M79.672 LEFT FOOT PAIN: Primary | ICD-10-CM

## 2025-08-11 DIAGNOSIS — M21.6X2 ACQUIRED PLANOVALGUS DEFORMITY OF LEFT FOOT: ICD-10-CM

## 2025-08-11 DIAGNOSIS — M19.072 PRIMARY OSTEOARTHRITIS OF LEFT FOOT: ICD-10-CM

## 2025-08-11 DIAGNOSIS — M76.822 POSTERIOR TIBIAL TENDINITIS OF LEFT LOWER EXTREMITY: ICD-10-CM

## 2025-08-11 PROCEDURE — M5015 MISC AIRCAST AIRHEEL: HCPCS | Performed by: ORTHOPAEDIC SURGERY

## 2025-08-11 PROCEDURE — M5023 MISC ANKLE SLEEVE: HCPCS | Performed by: ORTHOPAEDIC SURGERY

## 2025-08-11 PROCEDURE — 99214 OFFICE O/P EST MOD 30 MIN: CPT | Performed by: ORTHOPAEDIC SURGERY

## 2025-08-19 ENCOUNTER — HOSPITAL ENCOUNTER (OUTPATIENT)
Dept: LAB | Age: 55
Discharge: HOME | End: 2025-08-19
Payer: COMMERCIAL

## 2025-08-19 DIAGNOSIS — C50.411 MALIGNANT NEOPLASM OF UPPER-OUTER QUADRANT OF RIGHT BREAST IN FEMALE, ESTROGEN RECEPTOR POSITIVE (HCC): ICD-10-CM

## 2025-08-19 DIAGNOSIS — E55.9 VITAMIN D DEFICIENCY: ICD-10-CM

## 2025-08-19 DIAGNOSIS — Z17.0 MALIGNANT NEOPLASM OF UPPER-OUTER QUADRANT OF RIGHT BREAST IN FEMALE, ESTROGEN RECEPTOR POSITIVE (HCC): ICD-10-CM

## 2025-08-19 LAB
25(OH)D3 SERPL-MCNC: 36.2 NG/ML (ref 30–100)
ALBUMIN SERPL-MCNC: 4 G/DL (ref 3.5–5)
ALBUMIN/GLOB SERPL: 1 (ref 1–1.9)
ALP SERPL-CCNC: 98 U/L (ref 35–104)
ALT SERPL-CCNC: 100 U/L (ref 8–45)
ANION GAP SERPL CALC-SCNC: 13 MMOL/L (ref 7–16)
AST SERPL-CCNC: 58 U/L (ref 15–37)
BASOPHILS # BLD: 0.05 K/UL (ref 0–0.2)
BASOPHILS NFR BLD: 0.9 % (ref 0–2)
BILIRUB SERPL-MCNC: 0.5 MG/DL (ref 0–1.2)
BUN SERPL-MCNC: 13 MG/DL (ref 6–23)
CALCIUM SERPL-MCNC: 9.9 MG/DL (ref 8.8–10.2)
CHLORIDE SERPL-SCNC: 100 MMOL/L (ref 98–107)
CO2 SERPL-SCNC: 26 MMOL/L (ref 20–29)
CREAT SERPL-MCNC: 0.81 MG/DL (ref 0.6–1.1)
DIFFERENTIAL METHOD BLD: NORMAL
EOSINOPHIL # BLD: 0.1 K/UL (ref 0–0.8)
EOSINOPHIL NFR BLD: 1.7 % (ref 0.5–7.8)
ERYTHROCYTE [DISTWIDTH] IN BLOOD BY AUTOMATED COUNT: 13 % (ref 11.9–14.6)
GLOBULIN SER CALC-MCNC: 4 G/DL (ref 2.3–3.5)
GLUCOSE SERPL-MCNC: 111 MG/DL (ref 70–99)
HCT VFR BLD AUTO: 43.3 % (ref 35.8–46.3)
HGB BLD-MCNC: 14.5 G/DL (ref 11.7–15.4)
IMM GRANULOCYTES # BLD AUTO: 0.03 K/UL (ref 0–0.5)
IMM GRANULOCYTES NFR BLD AUTO: 0.5 % (ref 0–5)
LYMPHOCYTES # BLD: 1.05 K/UL (ref 0.5–4.6)
LYMPHOCYTES NFR BLD: 18 % (ref 13–44)
MCH RBC QN AUTO: 32.5 PG (ref 26.1–32.9)
MCHC RBC AUTO-ENTMCNC: 33.5 G/DL (ref 31.4–35)
MCV RBC AUTO: 97.1 FL (ref 82–102)
MONOCYTES # BLD: 0.52 K/UL (ref 0.1–1.3)
MONOCYTES NFR BLD: 8.9 % (ref 4–12)
NEUTS SEG # BLD: 4.08 K/UL (ref 1.7–8.2)
NEUTS SEG NFR BLD: 70 % (ref 43–78)
NRBC # BLD: 0 K/UL (ref 0–0.2)
PLATELET # BLD AUTO: 255 K/UL (ref 150–450)
PMV BLD AUTO: 10.8 FL (ref 9.4–12.3)
POTASSIUM SERPL-SCNC: 3.4 MMOL/L (ref 3.5–5.1)
PROT SERPL-MCNC: 8 G/DL (ref 6.3–8.2)
RBC # BLD AUTO: 4.46 M/UL (ref 4.05–5.2)
SODIUM SERPL-SCNC: 139 MMOL/L (ref 136–145)
WBC # BLD AUTO: 5.8 K/UL (ref 4.3–11.1)

## 2025-08-19 PROCEDURE — 85025 COMPLETE CBC W/AUTO DIFF WBC: CPT

## 2025-08-19 PROCEDURE — 82306 VITAMIN D 25 HYDROXY: CPT

## 2025-08-19 PROCEDURE — 36415 COLL VENOUS BLD VENIPUNCTURE: CPT

## 2025-08-19 PROCEDURE — 80053 COMPREHEN METABOLIC PANEL: CPT

## 2025-08-21 ENCOUNTER — OFFICE VISIT (OUTPATIENT)
Dept: ONCOLOGY | Age: 55
End: 2025-08-21
Payer: COMMERCIAL

## 2025-08-21 VITALS
DIASTOLIC BLOOD PRESSURE: 70 MMHG | WEIGHT: 188 LBS | OXYGEN SATURATION: 94 % | TEMPERATURE: 98.1 F | HEIGHT: 61 IN | RESPIRATION RATE: 18 BRPM | BODY MASS INDEX: 35.5 KG/M2 | HEART RATE: 105 BPM | SYSTOLIC BLOOD PRESSURE: 142 MMHG

## 2025-08-21 DIAGNOSIS — Z17.0 MALIGNANT NEOPLASM OF UPPER-OUTER QUADRANT OF RIGHT BREAST IN FEMALE, ESTROGEN RECEPTOR POSITIVE (HCC): Primary | ICD-10-CM

## 2025-08-21 DIAGNOSIS — K75.81 NASH (NONALCOHOLIC STEATOHEPATITIS): ICD-10-CM

## 2025-08-21 DIAGNOSIS — C50.411 MALIGNANT NEOPLASM OF UPPER-OUTER QUADRANT OF RIGHT BREAST IN FEMALE, ESTROGEN RECEPTOR POSITIVE (HCC): Primary | ICD-10-CM

## 2025-08-21 PROCEDURE — 3077F SYST BP >= 140 MM HG: CPT | Performed by: NURSE PRACTITIONER

## 2025-08-21 PROCEDURE — 3078F DIAST BP <80 MM HG: CPT | Performed by: NURSE PRACTITIONER

## 2025-08-21 PROCEDURE — 99214 OFFICE O/P EST MOD 30 MIN: CPT | Performed by: NURSE PRACTITIONER

## 2025-08-21 ASSESSMENT — ENCOUNTER SYMPTOMS
VOICE CHANGE: 0
SCLERAL ICTERUS: 0
HEMOPTYSIS: 0
CONSTIPATION: 0
TROUBLE SWALLOWING: 0
BLOOD IN STOOL: 0
DIARRHEA: 0
NAUSEA: 0
CHEST TIGHTNESS: 0
WHEEZING: 0
SORE THROAT: 0
ABDOMINAL DISTENTION: 0
VOMITING: 0
ABDOMINAL PAIN: 0
SHORTNESS OF BREATH: 0

## 2025-08-25 ENCOUNTER — OFFICE VISIT (OUTPATIENT)
Dept: ORTHOPEDIC SURGERY | Age: 55
End: 2025-08-25
Payer: COMMERCIAL

## 2025-08-25 DIAGNOSIS — M21.6X2 ACQUIRED PLANOVALGUS DEFORMITY OF LEFT FOOT: Primary | ICD-10-CM

## 2025-08-25 DIAGNOSIS — M76.822 POSTERIOR TIBIAL TENDINITIS OF LEFT LOWER EXTREMITY: ICD-10-CM

## 2025-08-25 PROCEDURE — 99213 OFFICE O/P EST LOW 20 MIN: CPT | Performed by: ORTHOPAEDIC SURGERY

## 2025-08-25 PROCEDURE — M5017 MISC EVENUP: HCPCS | Performed by: ORTHOPAEDIC SURGERY

## 2025-09-04 ENCOUNTER — CLINICAL DOCUMENTATION (OUTPATIENT)
Dept: ORTHOPEDIC SURGERY | Age: 55
End: 2025-09-04

## (undated) DEVICE — MASTISOL ADHESIVE LIQ 2/3ML

## (undated) DEVICE — GLOVE SURG SZ 75 CRM LTX FREE POLYISOPRENE POLYMER BEAD ANTI

## (undated) DEVICE — DRAPE,CHEST,FENES,15X10,STERIL: Brand: MEDLINE

## (undated) DEVICE — DRAPE,U/SHT,SPLIT,FILM,60X84,STERILE: Brand: MEDLINE

## (undated) DEVICE — ZIMMER® STERILE DISPOSABLE TOURNIQUET CUFF WITH PLC, DUAL PORT, SINGLE BLADDER, 30 IN. (76 CM)

## (undated) DEVICE — CONTAINER COLL/TRNSPRT BX BRST -- E-Z-EM CAT 8390

## (undated) DEVICE — BLADE ES ELASTOMERIC COAT INSUL DURABLE BEND UPTO 90DEG

## (undated) DEVICE — Device

## (undated) DEVICE — PAD PT POS 36 IN SURGYPAD DISP

## (undated) DEVICE — SUTURE STRATAFIX SZ 3-0 L30CM NONABSORBABLE UD L19MM PS-2 SXMP2B408

## (undated) DEVICE — TROCAR: Brand: KII® SLEEVE

## (undated) DEVICE — INTEGRATED CABLE WAND ICW, MENIVAC 45: Brand: COBLATION

## (undated) DEVICE — SOLUTION IV 1000ML 0.9% SOD CHL

## (undated) DEVICE — CONTAINER,SPECIMEN,O.R.STRL,4.5OZ: Brand: MEDLINE

## (undated) DEVICE — 1010 S-DRAPE TOWEL DRAPE 10/BX: Brand: STERI-DRAPE™

## (undated) DEVICE — KNEE ARTHROSCOPY DR KOCH: Brand: MEDLINE INDUSTRIES, INC.

## (undated) DEVICE — RESERVOIR,SUCTION,100CC,SILICONE: Brand: MEDLINE

## (undated) DEVICE — INTENDED FOR TISSUE SEPARATION, AND OTHER PROCEDURES THAT REQUIRE A SHARP SURGICAL BLADE TO PUNCTURE OR CUT.: Brand: BARD-PARKER ® STAINLESS STEEL BLADES

## (undated) DEVICE — TUBING INSUFFLATION SMK EVAC HI FLO SET PNEUMOCLEAR

## (undated) DEVICE — NEEDLE HYPO 21GA L1.5IN INTRAMUSCULAR S STL LATCH BVL UP

## (undated) DEVICE — STRIP,CLOSURE,WOUND,MEDI-STRIP,1/2X4: Brand: MEDLINE

## (undated) DEVICE — SURGICAL PROCEDURE PACK BASIC ST FRANCIS

## (undated) DEVICE — SUTURE VCRL SZ 3-0 L27IN ABSRB UD L26MM SH 1/2 CIR J416H

## (undated) DEVICE — CARDINAL HEALTH FLEXIBLE LIGHT HANDLE COVER: Brand: CARDINAL HEALTH

## (undated) DEVICE — MINOR SPLIT GENERAL: Brand: MEDLINE INDUSTRIES, INC.

## (undated) DEVICE — GYN LAPAROSCOPY: Brand: MEDLINE INDUSTRIES, INC.

## (undated) DEVICE — SYRINGE, LUER LOCK, 60ML: Brand: MEDLINE

## (undated) DEVICE — PADDING CAST W6INXL4YD ST COT COHESIVE HND TEARABLE SPEC

## (undated) DEVICE — NEEDLE SPNL L3.5IN PNK HUB S STL REG WALL FIT STYL W/ QNCKE

## (undated) DEVICE — APPLICATOR MEDICATED 26 CC SOLUTION HI LT ORNG CHLORAPREP

## (undated) DEVICE — NEEDLE HYPO 25GA L1.5IN BLU POLYPR HUB S STL REG BVL STR

## (undated) DEVICE — LIQUIBAND RAPID ADHESIVE 36/CS 0.8ML: Brand: MEDLINE

## (undated) DEVICE — SYRINGE MED 10ML LUERLOCK TIP W/O SFTY DISP

## (undated) DEVICE — (D)STRIP SKN CLSR 0.5X4IN WHT --

## (undated) DEVICE — 3M™ COBAN™ NL STERILE NON-LATEX SELF-ADHERENT WRAP, 2086S, 6 IN X 5 YD (15 CM X 4,5 M), 12 ROLLS/CASE: Brand: 3M™ COBAN™

## (undated) DEVICE — GLOVE SURG SZ 75 L12IN FNGR THK79MIL GRN LTX FREE

## (undated) DEVICE — SUTURE VICRYL + SZ 3-0 L27IN ABSRB UD L26MM SH 1/2 CIR VCP416H

## (undated) DEVICE — AGENT HEMSTAT 3GM OXIDIZED REGENERATED CELOS ABSRB FOR CONT (ORDER MULTIPLES OF 5EA)

## (undated) DEVICE — THE PHOTONBLADE IS AN RF DEVICE COUPLED WITH ILLUMINATION THAT IS INDICATED FOR CUTTING AND COAGULATION OF SOFT TISSUE DURING SURGICAL PROCEDURES. IT IS INTENDED TO BE USED WITH A VARIETY OF STANDARD COMMERCIALLY AVAILABLE ELECTROSURGICAL UNITS.: Brand: PHOTONBLADE

## (undated) DEVICE — TUBING, SUCTION, 1/4" X 10', STRAIGHT: Brand: MEDLINE

## (undated) DEVICE — 2000CC GUARDIAN II: Brand: GUARDIAN

## (undated) DEVICE — AMD ANTIMICROBIAL SUPER SPONGES,MEDIUM: Brand: KERLIX

## (undated) DEVICE — COVER US PRB W5XL96IN LTX W/ GEL

## (undated) DEVICE — SYSTEM SKIN CLSR 22CM DERMBND PRINEO

## (undated) DEVICE — GOWN,PREVENTION PLUS,XL,ST,24/CS: Brand: MEDLINE

## (undated) DEVICE — SUTURE MCRYL SZ 4-0 L18IN ABSRB UD L19MM PS-2 3/8 CIR PRIM Y496G

## (undated) DEVICE — SOLUTION IRRIG 3000ML 0.9% SOD CHL FLX CONT 0797208] ICU MEDICAL INC]

## (undated) DEVICE — SUTURE PERMAHAND SZ 2-0 L18IN NONABSORBABLE BLK L26MM PS 1588H

## (undated) DEVICE — SINGLE PORT MANIFOLD

## (undated) DEVICE — NEEDLE HYPO 18GA L1.5IN THN WALL PIVOTING SHLD BVL ORIENTED

## (undated) DEVICE — SUTURE MONOCRYL SZ 4-0 L18IN ABSRB UD L19MM PS-2 3/8 CIR PRIM Y496G

## (undated) DEVICE — INJECTOR UTERINE MANIPULATOR

## (undated) DEVICE — PAD,ABDOMINAL,5"X9",ST,LF,25/BX: Brand: MEDLINE INDUSTRIES, INC.

## (undated) DEVICE — STOCKINETTE,IMPERVIOUS,12X48,STERILE: Brand: MEDLINE

## (undated) DEVICE — DRAPE,T,LAPARO,TRANS,STERILE: Brand: MEDLINE

## (undated) DEVICE — SPONGE LAP W18XL18IN WHT COT 4 PLY FLD STRUNG RADPQ DISP ST 2 PER PACK

## (undated) DEVICE — T-DRAPE,EXTREMITY,STERILE: Brand: MEDLINE

## (undated) DEVICE — MEDI-VAC NON-CONDUCTIVE SUCTION TUBING: Brand: CARDINAL HEALTH

## (undated) DEVICE — SYRINGE MED 30ML STD CLR PLAS LUERLOCK TIP N CTRL DISP

## (undated) DEVICE — GARMENT,MEDLINE,DVT,INT,CALF,LG, GEN2: Brand: MEDLINE

## (undated) DEVICE — SUTURE PERMA-HAND SZ 2-0 L30IN NONABSORBABLE BLK L26MM SH K833H

## (undated) DEVICE — REM POLYHESIVE ADULT PATIENT RETURN ELECTRODE: Brand: VALLEYLAB

## (undated) DEVICE — GOWN,PREVENTION PLUS,XLN/XL,ST,24/CS: Brand: MEDLINE

## (undated) DEVICE — PADDING CAST W4INXL4YD ST COT COHESIVE HND TEARABLE SPEC

## (undated) DEVICE — TUBING PMP L16FT MAIN DISP FOR AR-6400 AR-6475

## (undated) DEVICE — (D)PREP SKN CHLRAPRP APPL 26ML -- CONVERT TO ITEM 371833

## (undated) DEVICE — BLADE SHV CUT MENIS AGG + 4MM --

## (undated) DEVICE — GOWN,SIRUS,NONRNF,SETINSLV,XL,20/CS: Brand: MEDLINE

## (undated) DEVICE — APPLIER CLP L9.38IN M LIG TI DISP STR RNG HNDL LIGACLP

## (undated) DEVICE — SUTURE VCRL SZ 4-0 L18IN ABSRB UD L19MM PS-2 3/8 CIR PRIM J496H

## (undated) DEVICE — SOLUTION IRRIG 3000ML 0.9% SOD CHL USP UROMATIC PLAS CONT

## (undated) DEVICE — KENDALL SCD EXPRESS SLEEVES, KNEE LENGTH, MEDIUM: Brand: KENDALL SCD

## (undated) DEVICE — SEALER TISS L37CM SHFT DIA5MM 360DEG ROT CVD JAW TAPR TIP

## (undated) DEVICE — SUTURE NONABSORBABLE MONOFILAMENT 3-0 PS-1 18 IN BLK ETHILON 1663H

## (undated) DEVICE — BLADE SHV L13CM DIA4MM CVD EXCALIBUR AGG COOLCUT

## (undated) DEVICE — CANISTER, RIGID, 2000CC: Brand: MEDLINE INDUSTRIES, INC.

## (undated) DEVICE — INSUFFLATION NEEDLE TO ESTABLISH PNEUMOPERITONEUM.: Brand: INSUFFLATION NEEDLE

## (undated) DEVICE — BANDAGE COMPR W6INXL12FT SMOOTH FOR LIMB EXSANG ESMARCH

## (undated) DEVICE — SUTURE PDS II SZ 2-0 L27IN ABSRB VLT L36MM CT-1 1/2 CIR Z339H

## (undated) DEVICE — BAG SPEC REM 224ML W4XL6IN DIA10MM 1 HND GYN DISP ENDOPCH

## (undated) DEVICE — SOLUTION IRRIG 1000ML 0.9% SOD CHL USP POUR PLAS BTL

## (undated) DEVICE — BANDAGE COMPR 396IN LEN 6IN W 1 LAYR CLP CLSR COT E W/ CLP

## (undated) DEVICE — SYRINGE MED 50ML LUERLOCK TIP

## (undated) DEVICE — 3M™ TEGADERM™ TRANSPARENT FILM DRESSING FRAME STYLE, 1626W, 4 IN X 4-3/4 IN (10 CM X 12 CM), 50/CT 4CT/CASE: Brand: 3M™ TEGADERM™

## (undated) DEVICE — STERILE HOOK LOCK LATEX FREE ELASTIC BANDAGE 6INX5YD: Brand: HOOK LOCK™

## (undated) DEVICE — WIRE CUTTER, STERILE (WCS144): Brand: CENTURION MEDICAL PRODUCTS CORP

## (undated) DEVICE — AEGIS 1" DISK 4MM HOLE, PEEL OPEN: Brand: MEDLINE

## (undated) DEVICE — TOTAL TRAY, DB, 100% SILI FOLEY, 16FR 10: Brand: MEDLINE

## (undated) DEVICE — LAPAROSCOPIC TROCAR SLEEVE/SINGLE USE: Brand: KII® OPTICAL ACCESS SYSTEM

## (undated) DEVICE — SUTURE VICRYL SZ 4-0 L18IN ABSRB UD L19MM PS-2 3/8 CIR PRIM J496H

## (undated) DEVICE — GARMENT,MEDLINE,DVT,INT,CALF,MED, GEN2: Brand: MEDLINE

## (undated) DEVICE — AMD ANTIMICROBIAL NON-ADHERENT PAD,0.2% POLYHEXAMETHYLENE BIGUANIDE HCI (PHMB): Brand: TELFA

## (undated) DEVICE — SET IRRIG W 96IN TBNG 4 LN FLX BG

## (undated) DEVICE — SYR 50ML LR LCK 1ML GRAD NSAF --

## (undated) DEVICE — SHEET,DRAPE,53X77,STERILE: Brand: MEDLINE

## (undated) DEVICE — SET IRRIG DST FLX M CONN

## (undated) DEVICE — SUTURE MCRYL SZ 3-0 L27IN ABSRB UD L24MM PS-1 3/8 CIR PRIM Y936H

## (undated) DEVICE — DRAIN SURG 19FR 0.25IN SIL RND W/ TRCR INDIC DOT RADPQ FULL

## (undated) DEVICE — AMD ANTIMICROBIAL GAUZE SPONGES,12 PLY USP TYPE VII, 0.2% POLYHEXAMETHYLENE BIGUANIDE HCI (PHMB): Brand: CURITY

## (undated) DEVICE — TROCAR: Brand: KII FIOS FIRST ENTRY